# Patient Record
Sex: FEMALE | Race: WHITE | NOT HISPANIC OR LATINO | Employment: OTHER | URBAN - METROPOLITAN AREA
[De-identification: names, ages, dates, MRNs, and addresses within clinical notes are randomized per-mention and may not be internally consistent; named-entity substitution may affect disease eponyms.]

---

## 2017-02-16 ENCOUNTER — RECORDS - HEALTHEAST (OUTPATIENT)
Dept: LAB | Facility: CLINIC | Age: 62
End: 2017-02-16

## 2017-02-16 LAB
CHOLEST SERPL-MCNC: 221 MG/DL
FASTING STATUS PATIENT QL REPORTED: ABNORMAL
HDLC SERPL-MCNC: 39 MG/DL
LDLC SERPL CALC-MCNC: 113 MG/DL
TRIGL SERPL-MCNC: 344 MG/DL

## 2017-12-07 ENCOUNTER — RECORDS - HEALTHEAST (OUTPATIENT)
Dept: LAB | Facility: CLINIC | Age: 62
End: 2017-12-07

## 2017-12-07 LAB
CHOLEST SERPL-MCNC: 148 MG/DL
FASTING STATUS PATIENT QL REPORTED: ABNORMAL
HDLC SERPL-MCNC: 40 MG/DL
LDLC SERPL CALC-MCNC: 61 MG/DL
TRIGL SERPL-MCNC: 237 MG/DL

## 2018-05-14 ENCOUNTER — RECORDS - HEALTHEAST (OUTPATIENT)
Dept: LAB | Facility: CLINIC | Age: 63
End: 2018-05-14

## 2018-05-14 LAB — TSH SERPL DL<=0.005 MIU/L-ACNC: 5.7 UIU/ML (ref 0.3–5)

## 2018-07-11 ENCOUNTER — RECORDS - HEALTHEAST (OUTPATIENT)
Dept: LAB | Facility: CLINIC | Age: 63
End: 2018-07-11

## 2018-07-12 LAB — BACTERIA SPEC CULT: NO GROWTH

## 2018-09-13 ENCOUNTER — HOSPITAL ENCOUNTER (OUTPATIENT)
Dept: MAMMOGRAPHY | Facility: CLINIC | Age: 63
Discharge: HOME | End: 2018-09-13

## 2018-09-13 DIAGNOSIS — Z12.31 VISIT FOR SCREENING MAMMOGRAM: ICD-10-CM

## 2018-12-20 ENCOUNTER — RECORDS - HEALTHEAST (OUTPATIENT)
Dept: LAB | Facility: CLINIC | Age: 63
End: 2018-12-20

## 2018-12-20 LAB
ANION GAP SERPL CALCULATED.3IONS-SCNC: 11 MMOL/L (ref 5–18)
BUN SERPL-MCNC: 22 MG/DL (ref 8–22)
CALCIUM SERPL-MCNC: 9.9 MG/DL (ref 8.5–10.5)
CHLORIDE BLD-SCNC: 101 MMOL/L (ref 98–107)
CO2 SERPL-SCNC: 26 MMOL/L (ref 22–31)
CREAT SERPL-MCNC: 1.06 MG/DL (ref 0.6–1.1)
GFR SERPL CREATININE-BSD FRML MDRD: 52 ML/MIN/1.73M2
GLUCOSE BLD-MCNC: 145 MG/DL (ref 70–125)
POTASSIUM BLD-SCNC: 3.8 MMOL/L (ref 3.5–5)
SODIUM SERPL-SCNC: 138 MMOL/L (ref 136–145)

## 2019-01-24 ENCOUNTER — RECORDS - HEALTHEAST (OUTPATIENT)
Dept: LAB | Facility: CLINIC | Age: 64
End: 2019-01-24

## 2019-01-24 LAB
CHOLEST SERPL-MCNC: 254 MG/DL
CREAT UR-MCNC: 124.5 MG/DL
FASTING STATUS PATIENT QL REPORTED: NO
HDLC SERPL-MCNC: 47 MG/DL
LDLC SERPL CALC-MCNC: 141 MG/DL
LDLC SERPL CALC-MCNC: ABNORMAL MG/DL
MICROALBUMIN UR-MCNC: 120.23 MG/DL (ref 0–1.99)
MICROALBUMIN/CREAT UR: 965.7 MG/G
TRIGL SERPL-MCNC: 452 MG/DL
TSH SERPL DL<=0.005 MIU/L-ACNC: 2.72 UIU/ML (ref 0.3–5)

## 2020-02-18 ENCOUNTER — OFFICE VISIT - HEALTHEAST (OUTPATIENT)
Dept: SURGERY | Facility: CLINIC | Age: 65
End: 2020-02-18

## 2020-02-18 ENCOUNTER — COMMUNICATION - HEALTHEAST (OUTPATIENT)
Dept: TELEHEALTH | Facility: CLINIC | Age: 65
End: 2020-02-18

## 2020-02-18 DIAGNOSIS — E66.811 OBESITY (BMI 30.0-34.9): ICD-10-CM

## 2020-02-18 DIAGNOSIS — E11.29 TYPE 2 DIABETES MELLITUS WITH MICROALBUMINURIA, UNSPECIFIED WHETHER LONG TERM INSULIN USE: ICD-10-CM

## 2020-02-18 DIAGNOSIS — R80.9 TYPE 2 DIABETES MELLITUS WITH MICROALBUMINURIA, UNSPECIFIED WHETHER LONG TERM INSULIN USE: ICD-10-CM

## 2020-02-18 DIAGNOSIS — K21.9 GASTROESOPHAGEAL REFLUX DISEASE, ESOPHAGITIS PRESENCE NOT SPECIFIED: ICD-10-CM

## 2020-02-18 DIAGNOSIS — I10 BENIGN ESSENTIAL HYPERTENSION: ICD-10-CM

## 2020-02-18 ASSESSMENT — MIFFLIN-ST. JEOR: SCORE: 1354.62

## 2020-02-19 ENCOUNTER — OFFICE VISIT - HEALTHEAST (OUTPATIENT)
Dept: SURGERY | Facility: CLINIC | Age: 65
End: 2020-02-19

## 2020-02-19 DIAGNOSIS — I10 BENIGN ESSENTIAL HYPERTENSION: ICD-10-CM

## 2020-02-19 DIAGNOSIS — R80.9 TYPE 2 DIABETES MELLITUS WITH MICROALBUMINURIA, UNSPECIFIED WHETHER LONG TERM INSULIN USE: ICD-10-CM

## 2020-02-19 DIAGNOSIS — E11.29 TYPE 2 DIABETES MELLITUS WITH MICROALBUMINURIA, UNSPECIFIED WHETHER LONG TERM INSULIN USE: ICD-10-CM

## 2020-02-19 DIAGNOSIS — E66.811 OBESITY (BMI 30.0-34.9): ICD-10-CM

## 2020-02-19 ASSESSMENT — MIFFLIN-ST. JEOR: SCORE: 1354.62

## 2020-03-26 ENCOUNTER — OFFICE VISIT - HEALTHEAST (OUTPATIENT)
Dept: SURGERY | Facility: CLINIC | Age: 65
End: 2020-03-26

## 2020-03-26 DIAGNOSIS — E11.29 TYPE 2 DIABETES MELLITUS WITH MICROALBUMINURIA, UNSPECIFIED WHETHER LONG TERM INSULIN USE: ICD-10-CM

## 2020-03-26 DIAGNOSIS — E66.811 OBESITY (BMI 30.0-34.9): ICD-10-CM

## 2020-03-26 DIAGNOSIS — R80.9 TYPE 2 DIABETES MELLITUS WITH MICROALBUMINURIA, UNSPECIFIED WHETHER LONG TERM INSULIN USE: ICD-10-CM

## 2020-03-26 ASSESSMENT — MIFFLIN-ST. JEOR: SCORE: 1354.62

## 2020-04-22 ENCOUNTER — OFFICE VISIT - HEALTHEAST (OUTPATIENT)
Dept: SURGERY | Facility: CLINIC | Age: 65
End: 2020-04-22

## 2020-04-22 DIAGNOSIS — E11.29 TYPE 2 DIABETES MELLITUS WITH MICROALBUMINURIA, UNSPECIFIED WHETHER LONG TERM INSULIN USE: ICD-10-CM

## 2020-04-22 DIAGNOSIS — E66.811 OBESITY (BMI 30.0-34.9): ICD-10-CM

## 2020-04-22 DIAGNOSIS — K21.9 GASTROESOPHAGEAL REFLUX DISEASE, ESOPHAGITIS PRESENCE NOT SPECIFIED: ICD-10-CM

## 2020-04-22 DIAGNOSIS — R80.9 TYPE 2 DIABETES MELLITUS WITH MICROALBUMINURIA, UNSPECIFIED WHETHER LONG TERM INSULIN USE: ICD-10-CM

## 2020-04-22 NOTE — ASSESSMENT & PLAN NOTE
We discussed healthy habits to assist with weight loss. We discussed medication that may assist with weight loss. She set a goal to try to eat one vegetable per day. She will review the lean protein handout and will try to work in some healthier protein. She will hold off on taking the topamaxfor now.

## 2020-04-22 NOTE — ASSESSMENT & PLAN NOTE
She is fatigued. Her blood sugars are all over the board. She will keep a diary of all food and drink intake, energy level and BS. She has a follow up appointment with Dr. Engel next month. She will continue current medications.

## 2020-05-07 ENCOUNTER — COMMUNICATION - HEALTHEAST (OUTPATIENT)
Dept: PHARMACY | Facility: CLINIC | Age: 65
End: 2020-05-07

## 2020-05-21 ENCOUNTER — OFFICE VISIT - HEALTHEAST (OUTPATIENT)
Dept: SURGERY | Facility: CLINIC | Age: 65
End: 2020-05-21

## 2020-05-21 DIAGNOSIS — Z71.3 NUTRITIONAL COUNSELING: ICD-10-CM

## 2020-05-21 DIAGNOSIS — E66.811 OBESITY (BMI 30.0-34.9): ICD-10-CM

## 2020-05-21 DIAGNOSIS — R80.9 TYPE 2 DIABETES MELLITUS WITH MICROALBUMINURIA, UNSPECIFIED WHETHER LONG TERM INSULIN USE: ICD-10-CM

## 2020-05-21 DIAGNOSIS — E11.29 TYPE 2 DIABETES MELLITUS WITH MICROALBUMINURIA, UNSPECIFIED WHETHER LONG TERM INSULIN USE: ICD-10-CM

## 2020-05-21 DIAGNOSIS — I10 BENIGN ESSENTIAL HYPERTENSION: ICD-10-CM

## 2020-05-21 ASSESSMENT — MIFFLIN-ST. JEOR: SCORE: 1354.62

## 2020-06-18 ENCOUNTER — RECORDS - HEALTHEAST (OUTPATIENT)
Dept: LAB | Facility: CLINIC | Age: 65
End: 2020-06-18

## 2020-06-18 LAB
ALBUMIN SERPL-MCNC: 3.7 G/DL (ref 3.5–5)
ALP SERPL-CCNC: 70 U/L (ref 45–120)
ALT SERPL W P-5'-P-CCNC: 16 U/L (ref 0–45)
ANION GAP SERPL CALCULATED.3IONS-SCNC: 13 MMOL/L (ref 5–18)
AST SERPL W P-5'-P-CCNC: 18 U/L (ref 0–40)
BILIRUB SERPL-MCNC: 1.2 MG/DL (ref 0–1)
BUN SERPL-MCNC: 20 MG/DL (ref 8–22)
CALCIUM SERPL-MCNC: 9.4 MG/DL (ref 8.5–10.5)
CHLORIDE BLD-SCNC: 103 MMOL/L (ref 98–107)
CHOLEST SERPL-MCNC: 157 MG/DL
CO2 SERPL-SCNC: 26 MMOL/L (ref 22–31)
CREAT SERPL-MCNC: 1.14 MG/DL (ref 0.6–1.1)
CREAT UR-MCNC: 124.8 MG/DL
FASTING STATUS PATIENT QL REPORTED: ABNORMAL
GFR SERPL CREATININE-BSD FRML MDRD: 48 ML/MIN/1.73M2
GLUCOSE BLD-MCNC: 140 MG/DL (ref 70–125)
HDLC SERPL-MCNC: 44 MG/DL
LDLC SERPL CALC-MCNC: 48 MG/DL
MICROALBUMIN UR-MCNC: 433.42 MG/DL (ref 0–1.99)
MICROALBUMIN/CREAT UR: 3472.9 MG/G
POTASSIUM BLD-SCNC: 4.4 MMOL/L (ref 3.5–5)
PROT SERPL-MCNC: 6.5 G/DL (ref 6–8)
SODIUM SERPL-SCNC: 142 MMOL/L (ref 136–145)
TRIGL SERPL-MCNC: 326 MG/DL
TSH SERPL DL<=0.005 MIU/L-ACNC: 2.81 UIU/ML (ref 0.3–5)
VIT B12 SERPL-MCNC: 236 PG/ML (ref 213–816)

## 2020-06-25 ENCOUNTER — OFFICE VISIT - HEALTHEAST (OUTPATIENT)
Dept: SURGERY | Facility: CLINIC | Age: 65
End: 2020-06-25

## 2020-06-25 DIAGNOSIS — R80.9 TYPE 2 DIABETES MELLITUS WITH MICROALBUMINURIA, UNSPECIFIED WHETHER LONG TERM INSULIN USE: ICD-10-CM

## 2020-06-25 DIAGNOSIS — Z71.3 NUTRITIONAL COUNSELING: ICD-10-CM

## 2020-06-25 DIAGNOSIS — E66.811 OBESITY, CLASS I, BMI 30.0-34.9 (SEE ACTUAL BMI): ICD-10-CM

## 2020-06-25 DIAGNOSIS — E11.29 TYPE 2 DIABETES MELLITUS WITH MICROALBUMINURIA, UNSPECIFIED WHETHER LONG TERM INSULIN USE: ICD-10-CM

## 2020-06-25 ASSESSMENT — MIFFLIN-ST. JEOR: SCORE: 1354.62

## 2020-07-09 ENCOUNTER — RECORDS - HEALTHEAST (OUTPATIENT)
Dept: LAB | Facility: CLINIC | Age: 65
End: 2020-07-09

## 2020-07-09 LAB
ANION GAP SERPL CALCULATED.3IONS-SCNC: 13 MMOL/L (ref 5–18)
BUN SERPL-MCNC: 37 MG/DL (ref 8–22)
CALCIUM SERPL-MCNC: 9.5 MG/DL (ref 8.5–10.5)
CHLORIDE BLD-SCNC: 103 MMOL/L (ref 98–107)
CO2 SERPL-SCNC: 22 MMOL/L (ref 22–31)
CREAT SERPL-MCNC: 1.24 MG/DL (ref 0.6–1.1)
GFR SERPL CREATININE-BSD FRML MDRD: 43 ML/MIN/1.73M2
GLUCOSE BLD-MCNC: 72 MG/DL (ref 70–125)
POTASSIUM BLD-SCNC: 4.2 MMOL/L (ref 3.5–5)
SODIUM SERPL-SCNC: 138 MMOL/L (ref 136–145)

## 2020-08-24 ENCOUNTER — COMMUNICATION - HEALTHEAST (OUTPATIENT)
Dept: CARE COORDINATION | Facility: CLINIC | Age: 65
End: 2020-08-24

## 2020-08-24 ENCOUNTER — AMBULATORY - HEALTHEAST (OUTPATIENT)
Dept: CARE COORDINATION | Facility: CLINIC | Age: 65
End: 2020-08-24

## 2020-08-24 DIAGNOSIS — U07.1 2019 NOVEL CORONAVIRUS DISEASE (COVID-19): ICD-10-CM

## 2020-09-01 ENCOUNTER — COMMUNICATION - HEALTHEAST (OUTPATIENT)
Dept: SURGERY | Facility: CLINIC | Age: 65
End: 2020-09-01

## 2021-02-19 ENCOUNTER — RECORDS - HEALTHEAST (OUTPATIENT)
Dept: LAB | Facility: CLINIC | Age: 66
End: 2021-02-19

## 2021-02-20 LAB — BACTERIA SPEC CULT: NO GROWTH

## 2021-04-07 ENCOUNTER — RECORDS - HEALTHEAST (OUTPATIENT)
Dept: LAB | Facility: CLINIC | Age: 66
End: 2021-04-07

## 2021-04-07 LAB
ALBUMIN SERPL-MCNC: 3.5 G/DL (ref 3.5–5)
ALP SERPL-CCNC: 74 U/L (ref 45–120)
ALT SERPL W P-5'-P-CCNC: 16 U/L (ref 0–45)
ANION GAP SERPL CALCULATED.3IONS-SCNC: 13 MMOL/L (ref 5–18)
AST SERPL W P-5'-P-CCNC: 12 U/L (ref 0–40)
BILIRUB SERPL-MCNC: 1 MG/DL (ref 0–1)
BUN SERPL-MCNC: 26 MG/DL (ref 8–22)
CALCIUM SERPL-MCNC: 8.9 MG/DL (ref 8.5–10.5)
CHLORIDE BLD-SCNC: 100 MMOL/L (ref 98–107)
CHOLEST SERPL-MCNC: 216 MG/DL
CO2 SERPL-SCNC: 25 MMOL/L (ref 22–31)
CREAT SERPL-MCNC: 1.59 MG/DL (ref 0.6–1.1)
FASTING STATUS PATIENT QL REPORTED: ABNORMAL
GFR SERPL CREATININE-BSD FRML MDRD: 32 ML/MIN/1.73M2
GLUCOSE BLD-MCNC: 288 MG/DL (ref 70–125)
HDLC SERPL-MCNC: 48 MG/DL
LDLC SERPL CALC-MCNC: 117 MG/DL
LDLC SERPL CALC-MCNC: ABNORMAL MG/DL
POTASSIUM BLD-SCNC: 4.7 MMOL/L (ref 3.5–5)
PROT SERPL-MCNC: 6.6 G/DL (ref 6–8)
SODIUM SERPL-SCNC: 138 MMOL/L (ref 136–145)
T4 FREE SERPL-MCNC: 0.9 NG/DL (ref 0.7–1.8)
TRIGL SERPL-MCNC: 467 MG/DL
TSH SERPL DL<=0.005 MIU/L-ACNC: 5.55 UIU/ML (ref 0.3–5)

## 2021-05-24 ENCOUNTER — RECORDS - HEALTHEAST (OUTPATIENT)
Dept: ADMINISTRATIVE | Facility: CLINIC | Age: 66
End: 2021-05-24

## 2021-05-25 ENCOUNTER — RECORDS - HEALTHEAST (OUTPATIENT)
Dept: ADMINISTRATIVE | Facility: CLINIC | Age: 66
End: 2021-05-25

## 2021-05-26 ENCOUNTER — RECORDS - HEALTHEAST (OUTPATIENT)
Dept: ADMINISTRATIVE | Facility: CLINIC | Age: 66
End: 2021-05-26

## 2021-05-27 ENCOUNTER — RECORDS - HEALTHEAST (OUTPATIENT)
Dept: ADMINISTRATIVE | Facility: CLINIC | Age: 66
End: 2021-05-27

## 2021-05-28 ENCOUNTER — RECORDS - HEALTHEAST (OUTPATIENT)
Dept: ADMINISTRATIVE | Facility: CLINIC | Age: 66
End: 2021-05-28

## 2021-05-29 ENCOUNTER — RECORDS - HEALTHEAST (OUTPATIENT)
Dept: ADMINISTRATIVE | Facility: CLINIC | Age: 66
End: 2021-05-29

## 2021-05-30 ENCOUNTER — RECORDS - HEALTHEAST (OUTPATIENT)
Dept: ADMINISTRATIVE | Facility: CLINIC | Age: 66
End: 2021-05-30

## 2021-06-02 ENCOUNTER — RECORDS - HEALTHEAST (OUTPATIENT)
Dept: ADMINISTRATIVE | Facility: CLINIC | Age: 66
End: 2021-06-02

## 2021-06-04 VITALS
WEIGHT: 191 LBS | DIASTOLIC BLOOD PRESSURE: 82 MMHG | BODY MASS INDEX: 35.15 KG/M2 | HEIGHT: 62 IN | OXYGEN SATURATION: 98 % | SYSTOLIC BLOOD PRESSURE: 134 MMHG | HEART RATE: 88 BPM | RESPIRATION RATE: 18 BRPM

## 2021-06-04 VITALS — BODY MASS INDEX: 35.15 KG/M2 | HEIGHT: 62 IN | WEIGHT: 191 LBS

## 2021-06-04 VITALS — HEIGHT: 62 IN | BODY MASS INDEX: 35.15 KG/M2 | WEIGHT: 191 LBS

## 2021-06-04 VITALS — WEIGHT: 191 LBS | BODY MASS INDEX: 35.15 KG/M2 | HEIGHT: 62 IN

## 2021-06-06 NOTE — PROGRESS NOTES
Medical  Weight Loss Initial Diet Evaluation  Mariama is presenting today for a new weight management and diabetes nutrition consultation. Pt has had an initial appointment with Dr. Jackson.  Weight loss medication: topamax. Patient unsure if she will fill this. Patient has panic related to medications.   Weight Loss Goal: patient would like to get control of her eating for her health and diabetes, 135lb  Nutrition Assessment:   Anthropometrics:  Pt's Initial Weight: 191 lbs  Weight: 191 lb (86.6 kg)  Weight loss from initial: 0  % Weight loss: 0 %    BMI:   Vitals:    02/19/20 0700   Weight: 191 lb (86.6 kg)      IBW: 110-120lb  Estimated RMR ([place]-St Jeor equation): 1365kcal   Recommended Protein Intake: 66-88 grams of protein/day  Medical History:  Patient Active Problem List   Diagnosis     Diabetes Mellitus Poorly Controlled     Hyperlipidemia     Obesity     Hypertension     Esophageal Reflux     Anxiety     Chronic fatigue syndrome     Hypothyroidism     Long term current use of insulin (H)     Macromastia     Polyneuropathy due to type 2 diabetes mellitus (H)     Recurrent major depression in full remission (H)     Tobacco user     Vitamin D deficiency     Diabetes: Yes- she feels as though she can get on track and stay there for a little while and then gets off track  Testing blood sugars: Yes occasionally- couple days ago was 96 A1c a year ago 7.7  DM Meds currently taking: glipizide-metformin, levemir  Nutrition History:   Food allergies/intolerances/cultural or religous food customs: No   Biggest weight loss struggle per pt: eats when she wants to eat   Vitamins/Mineral Supplementation: none  Dietary Recall:  +Gets food stamps and uses them for [employer] food  Wake up time: patient has a hard time sleeping  Breakfast: 2 pieces of peanut butter toast and a cup of black coffee  Snack: none  Lunch: hamburger hot dish with veggies and noodles  Snack: none  Dinner:hamburger hot dish with noodles and veggies,  1 hour later small bowl of chili  Snack: 2 more small bowls of chili throughout the night  Overnight eating: Yes- anything  Hydration (type/oz. per day):  Water: 96oz  Caffeine: black coffee  Carbonation: diet cherry coke, 3-4 times per week   Alcohol : very little  Exercise:  Routine exercise established: No  Has a gym right next to her apartment- exercised with son   Nutrition Diagnosis (PES statement):   Overweight/Obesity (NC 3.3) related to overeating and poor lifestyle habits as evidenced by patient report of frequent snacking, large portions, high carbohydrate diet, lack of activity and BMI 34.93  Not ready for diet/lifestyle change (NB 1.3) related to unsupported beliefs/attitudes about food, nutrition and nutrition-related topics as evidenced by patient's subjective statements, denial of need to change  Nutrition Intervention:  1. Food and/or Nutrient Delivery   a. Placed emphasis on importance of developing a healthy meal routine, aiming for 3 meals a day and no snacks.  2. Nutrition Education   a. Discussed with patient how to build a meal: the importance of including a lean/low fat protein at each meal, include a source of vegetables at a minimum of lunch and dinner and limiting carbohydrate intake to 30g per meal.  b. Educated on sources of lean protein, portion sizes, the amount of grams found in each source. Recommend patient to aim for 20-30g protein at each meal.  c. Educated on label reading for fiber and net carbohydrates for blood glucose control  3. Nutrition Counseling   a. Encouraged importance of developing routine exercise for health benefits and weight loss.    Goals established by patient:   1. Gym 4 days per week  2. Practice planning and prepping for meals- patient is planning to do bean soups and stews  Handouts provided:  Plate Method  Nutrition Monitoring/Evaluation:   Follow up:  Pt will follow up in 2 month(s) with bariatrician and 1 month(s) with dietitian.   Time spent with  patient: 30 minutes  Maci Ruggiero RD   ABN: Yes

## 2021-06-06 NOTE — PATIENT INSTRUCTIONS - HE

## 2021-06-07 NOTE — PATIENT INSTRUCTIONS - HE

## 2021-06-07 NOTE — PROGRESS NOTES
"Mariama Keene is a 64 y.o. female who is being evaluated via a billable telephone visit.      The patient has been notified of following:     \"This telephone visit will be conducted via a call between you and your physician/provider. We have found that certain health care needs can be provided without the need for a physical exam.  This service lets us provide the care you need with a short phone conversation.  If a prescription is necessary we can send it directly to your pharmacy.  If lab work is needed we can place an order for that and you can then stop by our lab to have the test done at a later time.    Telephone visits are billed at different rates depending on your insurance coverage. During this emergency period, for some insurers they may be billed the same as an in-person visit.  Please reach out to your insurance provider with any questions.    If during the course of the call the physician/provider feels a telephone visit is not appropriate, you will not be charged for this service.\"    Patient has given verbal consent to a Telephone visit? Yes    Patient would like to receive their AVS by AVS Preference: Morgan.      Phone call duration: 20 minutes    Jenna Villeda CMA      "

## 2021-06-07 NOTE — PROGRESS NOTES
"    Mariama Keene is a 64 y.o. female who is being evaluated via a billable telephone visit.      The patient has been notified of following:     \"This telephone visit will be conducted via a call between you and your physician/provider. We have found that certain health care needs can be provided without the need for a physical exam.  This service lets us provide the care you need with a short phone conversation.  If a prescription is necessary we can send it directly to your pharmacy.  If lab work is needed we can place an order for that and you can then stop by our lab to have the test done at a later time.    If during the course of the call the physician/provider feels a telephone visit is not appropriate, you will not be charged for this service.\"     Provider notes:    Mariama Keene presents for non surgical weight loss management.    MEDICATIONS were reviewed and updated in the chart    ALLERGIES  Alprazolam; Cephalexin; Exenatide; Iodinated contrast media; Lisinopril; Nitrofurantoin monohyd/m-cryst; and Sulfa (sulfonamide antibiotics)    Patient Profile   Social History     Social History Narrative     Not on file        Past Medical History   Past Medical History:   Diagnosis Date     Asthma      Diabetes (H)      Hypertension      Panic attacks      Patient Active Problem List   Diagnosis     Diabetes Mellitus Poorly Controlled     Hyperlipidemia     Obesity     Hypertension     Esophageal Reflux     Anxiety     Chronic fatigue syndrome     Hypothyroidism     Long term current use of insulin (H)     Macromastia     Polyneuropathy due to type 2 diabetes mellitus (H)     Recurrent major depression in full remission (H)     Tobacco user     Vitamin D deficiency     Gastroesophageal reflux disease, esophagitis presence not specified     Obesity (BMI 30.0-34.9)     Current Outpatient Medications   Medication Sig     glipiZIDE-metFORMIN (METAGLIP) 5-500 mg per tablet Take 2 tablets by mouth 2 (two) times a " day.      insulin detemir U-100 (LEVEMIR) 100 unit/mL injection Inject 53 Units under the skin at bedtime.     levothyroxine (SYNTHROID) 75 MCG tablet Take 1 tablet by mouth daily.      metoprolol tartrate (LOPRESSOR) 50 MG tablet Take 2 tablets by mouth 2 (two) times a day.      omeprazole (PRILOSEC) 20 MG capsule Take 1 capsule by mouth daily.      rosuvastatin (CRESTOR) 5 MG tablet Take 1 tablet by mouth daily.      topiramate (TOPAMAX) 50 MG tablet 1/2 tab with dinner x 1 week then 1 tab with dinner every day       Past Surgical History  She has a past surgical history that includes Oophorectomy; Hysterectomy; Abscess drainage; and Partial nephrectomy (Right).       INTERIM HISTORY  Patient has met with the dietician on 2 occasions since our initial visit. She has been feeling overwhelmed with making food choices. She has not started the topamax.     PATIENT REPORTED CURRENT WEIGHT   unsure    DIETARY HISTORY  MEALS: B: bean soup or oatmeal and toast or english muffin and grapefruit  L: meatloaf or hotdogs with bread, very rare vegetables D: similar to lunch  SNACKS: candy, sweats- has been trying to avoid  NUMBER OF MEALS/WEEK NOT PREPARED AT HOME: rare  CALORIE CONTAINING BEVERAGES/WEEK: not discussed    Positive Changes Since Last Visit: trying to avoid sweets  Struggling With: low energy, vegetable intake    PHYSICAL ACTIVITY PATTERNS:  Cardiovascular: she was riding the exercise bike for 15 minutes twice a week, now she finds she can't ride as long do to low energy and back pain and loss of strength   Strength Training: less recently    REVIEW OF SYSTEMS  GENERAL/CONSTITUTIONAL:  Fatigue: yes  HEENT:  Vision changes, glaucoma: no  CARDIOVASCULAR:  Chest Pain with Exertion: no  PULMONARY:  Dyspnea on exertion: yes  NEUROLOGIC:  Paresthesias: sometimes  PSYCHIATRIC:  Moods: frustrated  MUSCULOSKELETAL/RHEUMATOLOGIC  Arthralgias: yes  Myalgias: yes  ENDOCRINE:  Monitoring Blood Sugars: yes- am only  Sugars  Well Controlled: 117, 445?, 224, 75, 164, 78       Patient Profile   Social History     Social History Narrative     Not on file        Past Medical History   Past Medical History:   Diagnosis Date     Asthma      Diabetes (H)      Hypertension      Panic attacks      Patient Active Problem List   Diagnosis     Diabetes Mellitus Poorly Controlled     Hyperlipidemia     Obesity     Hypertension     Esophageal Reflux     Anxiety     Chronic fatigue syndrome     Hypothyroidism     Long term current use of insulin (H)     Macromastia     Polyneuropathy due to type 2 diabetes mellitus (H)     Recurrent major depression in full remission (H)     Tobacco user     Vitamin D deficiency     Gastroesophageal reflux disease, esophagitis presence not specified     Obesity (BMI 30.0-34.9)     Current Outpatient Medications   Medication Sig     glipiZIDE-metFORMIN (METAGLIP) 5-500 mg per tablet Take 2 tablets by mouth 2 (two) times a day.      insulin detemir U-100 (LEVEMIR) 100 unit/mL injection Inject 53 Units under the skin at bedtime.     levothyroxine (SYNTHROID) 75 MCG tablet Take 1 tablet by mouth daily.      metoprolol tartrate (LOPRESSOR) 50 MG tablet Take 2 tablets by mouth 2 (two) times a day.      omeprazole (PRILOSEC) 20 MG capsule Take 1 capsule by mouth daily.      rosuvastatin (CRESTOR) 5 MG tablet Take 1 tablet by mouth daily.      topiramate (TOPAMAX) 50 MG tablet 1/2 tab with dinner x 1 week then 1 tab with dinner every day       Past Surgical History  She has a past surgical history that includes Oophorectomy; Hysterectomy; Abscess drainage; and Partial nephrectomy (Right).         Counseling:   We reviewed the important healthy habits for weight loss:  -eating 3 meals daily  -eating protein first, getting >60gm protein daily  -eating slowly, chewing food well  -avoiding/limiting calorie containing beverages  -limiting starchy vegetables and carbohydrates, choosing wheat, not white with breads,   crackers,  pastas, rodriguez, bagels, tortillas, rice  -limiting restaurant or cafeteria eating to twice a week or less  -drinking adequate water    We discussed the importance of restorative sleep and stress management in maintaining a healthy weight.  We discussed the importance of physical activity including cardiovascular and strength training in maintaining a healthier weight.          Assessment/Plan:    Gastroesophageal reflux disease, esophagitis presence not specified  This may improve with healthy habits and weight loss.    Type 2 diabetes mellitus with microalbuminuria, unspecified whether long term insulin use (H)  She is fatigued. Her blood sugars are all over the board. She will keep a diary of all food and drink intake, energy level and BS. She has a follow up appointment with Dr. Engel next month. She will continue current medications.     Obesity (BMI 30.0-34.9)  We discussed healthy habits to assist with weight loss. We discussed medication that may assist with weight loss. She set a goal to try to eat one vegetable per day. She will review the lean protein handout and will try to work in some healthier protein. She will hold off on taking the topamax for now.        I have reviewed the note as documented above.  This accurately captures the substance of my conversation with the patient.      Phone call contact time    Call Started at: 9:10 am  Call Ended at: 9:35 am      Signature:  TEE Jackson MD  [facility]

## 2021-06-07 NOTE — PATIENT INSTRUCTIONS - HE
Go grocery shopping at 7am tomorrow morning    Breakfast 7-8am yogurt and grapefruit with coffee    Lunch 12-1p    Dinner 6-7pm hotdishes    Ride bike in the morning and evening

## 2021-06-07 NOTE — PROGRESS NOTES
"Mariama Keene is a 64 y.o. female who is being evaluated via a billable telephone visit.      The patient has been notified of following:     \"This telephone visit will be conducted via a call between you and your physician/provider. We have found that certain health care needs can be provided without the need for a physical exam.  This service lets us provide the care you need with a short phone conversation.  If a prescription is necessary we can send it directly to your pharmacy.  If lab work is needed we can place an order for that and you can then stop by our lab to have the test done at a later time.    If during the course of the call the physician/provider feels a telephone visit is not appropriate, you will not be charged for this service.\"     Mariama Keene complains of    Chief Complaint   Patient presents with     Nutrition Counseling       I have reviewed and updated the patient's Past Medical History, Social History, Family History and Medication List.    ALLERGIES  Alprazolam; Cephalexin; Exenatide; Iodinated contrast media; Lisinopril; Nitrofurantoin monohyd/m-cryst; and Sulfa (sulfonamide antibiotics)    Additional provider notes:     Medical  Weight Loss Follow-Up Diet Evaluation  Assessment:  Mariama is presenting today for a follow up weight management nutrition consultation. Pt has had an initial appointment with Dr. Jackson  Weight loss medication: topamax, has not been taking it, but has been thinking of starting.  Pt's Initial Weight: 191 lbs  Weight: 191 lb (86.6 kg)  Weight loss from initial: 0  % Weight loss: 0 %    BMI: Body mass index is 34.93 kg/m .  IBW: 110-120 lbs    Estimated RMR ([place]-St Jeor equation): 1365kcal   Recommended Protein Intake: 66-88 grams of protein/day  Patient Active Problem List:  Patient Active Problem List   Diagnosis     Diabetes Mellitus Poorly Controlled     Hyperlipidemia     Obesity     Hypertension     Esophageal Reflux     Anxiety     Chronic fatigue " "syndrome     Hypothyroidism     Long term current use of insulin (H)     Macromastia     Polyneuropathy due to type 2 diabetes mellitus (H)     Recurrent major depression in full remission (H)     Tobacco user     Vitamin D deficiency     Diabetes: Yes not testing blood glucose \"because I know they are going to be high\"    Progress on goals from last visit: Patient is a bit nervous because \"I am a diabetic and I don't take care of myself.\" She states she is overwhelmed with food choices, reporting that it is hard to find her motivation despite knowing that taking care of herself will make her life longer.   We discussed making small, specific changes to as not to overwhelm her, starting with creating structure in her routine.     Dietary Recall:  Breakfast: cheese and egg omelet, 2 sausage links, grapefruit and piece of toast  Snack:none  Lunch: 4pm- frozen meal- healthy choice, diet coke  Snack: none  Dinner: ritz crackers- 1/2 sleeve with 1000 island dressing  Overnight eating: No  Hydration (type/oz. per day):  Water: 80-90oz  Caffeine: black coffee   Carbonation: diet coke occasionally  Exercise:  Routine exercise established: Yes  20 min bike in the morning and afternoon, weights     Nutrition Diagnosis:    Overweight/Obesity (NC 3.3) related to overeating and poor lifestyle habits as evidenced by patient's report of frequent snacking, lack of motivation and BMI 34.93  Not ready for diet/lifestyle change (NB 1.3) related to unsupported beliefs/attitudes about food, nutrition and nutrition-related topics as evidenced by patient's subjective statements, inability to meet goals previously set       Intervention:    1. Nutrition counseling: discussed developing structure in her routine, patient thinks of all the things she needs to change and then doesn't do any of them, so today we reviewed the importance of taking baby steps, one change at a time.     Monitoring/Evaluation:    Goals:  1. Follow eating routine of " breakfast 7-8a, lunch 12-1p and dinner 6-7pm  2. Exercise in morning and evening on bike 20 min each time    Assessment/Plan:  1. Type 2 diabetes mellitus with microalbuminuria, unspecified whether long term insulin use (H)    2. Obesity (BMI 30.0-34.9)      Patient to follow up in 1 months(s) with bariatrician and 2 month(s) with RD    Phone call duration: 30 minutes    Maci Ruggiero RD

## 2021-06-08 NOTE — PROGRESS NOTES
"Mariama Keene is a 64 y.o. female who is being evaluated via a billable telephone visit.      The patient has been notified of following:     \"This telephone visit will be conducted via a call between you and your physician/provider. We have found that certain health care needs can be provided without the need for a physical exam.  This service lets us provide the care you need with a short phone conversation.  If a prescription is necessary we can send it directly to your pharmacy.  If lab work is needed we can place an order for that and you can then stop by our lab to have the test done at a later time.    If during the course of the call the physician/provider feels a telephone visit is not appropriate, you will not be charged for this service.\"     Mariama Keene complains of    Chief Complaint   Patient presents with     Nutrition Counseling       I have reviewed and updated the patient's Past Medical History, Social History, Family History and Medication List.    ALLERGIES  Alprazolam; Cephalexin; Exenatide; Iodinated contrast media; Lisinopril; Nitrofurantoin monohyd/m-cryst; and Sulfa (sulfonamide antibiotics)    Additional provider notes:     Medical  Weight Loss Follow-Up Diet Evaluation  Assessment:  Mariama is presenting today for a follow up weight management nutrition consultation. Pt has had an initial appointment with Dr. Jackson  Pt's Initial Weight: 191 lbs  Weight: 191 lb (86.6 kg) (no new weight)  Weight loss from initial: 0  % Weight loss: 0 %    BMI: Body mass index is 34.93 kg/m .  IBW: 110-120 lbs    Estimated RMR ([place]-St Jeor equation): 1365 kcal   Recommended Protein Intake: 66-88 grams of protein/day  Patient Active Problem List:  Patient Active Problem List   Diagnosis     Type 2 diabetes mellitus with microalbuminuria, unspecified whether long term insulin use (H)     Hyperlipidemia     Obesity     Hypertension     Esophageal Reflux     Anxiety     Chronic fatigue syndrome     " "Hypothyroidism     Long term current use of insulin (H)     Macromastia     Polyneuropathy due to type 2 diabetes mellitus (H)     Recurrent major depression in full remission (H)     Tobacco user     Vitamin D deficiency     Gastroesophageal reflux disease, esophagitis presence not specified     Obesity (BMI 30.0-34.9)     Diabetes: Yes 109 yesterday    Progress on goals from last visit: patient states she is back on her diet, following a low carbohydrate diet. Patient states she \"hates\" veggies, but is eating stir fries and salads with homemade dressing and is enjoying the flavor and taste of these things. Trying to aim for 15-30g carbohydrate per meal, eating twice per day.   +patient was a lot more positive today, but did mention that she has a hard time continuing with positive changes.    Dietary Recall:  Breakfast: grapefruit and 1 egg and 3 pieces of ndiaye  Dinner:soft shell taco with meat, sour cream and hot sauce and a glass of milk  Snack: port wine cheese and crackers  +thinking about adding an apple  Exercise:  Routine exercise established: No  Will plant to start walking and getting back on the bike at the gym   We talked about dancing and patient states she is really excited to add this in    Nutrition Diagnosis:    Overweight/Obesity (NC 3.3) related to overeating and poor lifestyle habits as evidenced by patient's report of frequent snacking, lack of motivation and BMI 34.93    Intervention:  1. Food and/or nutrient delivery: discussed meal planning and developed alternate ideas for breakfast  2. Nutrition education: educated on carbohydrate counting, encouraging 30g carb per meal and 10g for snacks  3. Nutrition counseling: motivational interviewing    Monitoring/Evaluation:    Goals:  1. Walk and dance for exercise  2. 30g carbohydrate per meal, 10g carbohydrate for a snack    Assessment/Plan:    Patient to follow up in 1 month(s) with RD    Phone call duration: 30 minutes    Maci Ruggiero RD      "

## 2021-06-08 NOTE — TELEPHONE ENCOUNTER
Med Rec:                                                    Chart audited for medication reconciliation regarding the following medications:   Irbesartan      Pharmacy contacted: No  Patient contacted: No  Discrepancies found: No  Action taken: None - verified dosage against continuity of care document from Naye Marino PharmD, MELISSA, BCACP  [facility]     Time spent: 2 minutes

## 2021-06-09 NOTE — PROGRESS NOTES
"Mariama Keene is a 64 y.o. female who is being evaluated via a billable telephone visit.      The patient has been notified of following:     \"This telephone visit will be conducted via a call between you and your physician/provider. We have found that certain health care needs can be provided without the need for a physical exam.  This service lets us provide the care you need with a short phone conversation.  If a prescription is necessary we can send it directly to your pharmacy.  If lab work is needed we can place an order for that and you can then stop by our lab to have the test done at a later time.    If during the course of the call the physician/provider feels a telephone visit is not appropriate, you will not be charged for this service.\"     Mariama Keene complains of  No chief complaint on file.      I have reviewed and updated the patient's Past Medical History, Social History, Family History and Medication List.    ALLERGIES  Alprazolam; Cephalexin; Exenatide; Iodinated contrast media; Lisinopril; Nitrofurantoin monohyd/m-cryst; and Sulfa (sulfonamide antibiotics)    Additional provider notes:     Medical  Weight Loss Follow-Up Diet Evaluation  Assessment:  Mariama is presenting today for a follow up weight management nutrition consultation. Pt has had an initial appointment with Dr. Jackson  Pt's Initial Weight: 191 lbs  Weight: 191 lb (86.6 kg) (no new weight reported)  Weight loss from initial: 0  % Weight loss: 0 %    BMI: Body mass index is 34.93 kg/m .  IBW: 110-120 lbs    Estimated RMR ([place]-St Jeor equation): 1365kcal   Recommended Protein Intake: 66-88 grams of protein/day  Patient Active Problem List:  Patient Active Problem List   Diagnosis     Type 2 diabetes mellitus with microalbuminuria, unspecified whether long term insulin use (H)     Hyperlipidemia     Obesity     Hypertension     Esophageal Reflux     Anxiety     Chronic fatigue syndrome     Hypothyroidism     Long term current " use of insulin (H)     Macromastia     Polyneuropathy due to type 2 diabetes mellitus (H)     Recurrent major depression in full remission (H)     Tobacco user     Vitamin D deficiency     Gastroesophageal reflux disease, esophagitis presence not specified     Obesity (BMI 30.0-34.9)     Diabetes: Yes most recent A1c is 9 per patient     Progress on goals from last visit: patient states things are not as positive as last visit  Is drinking some tea in the morning with turmeric, cinnamon, et. She had good news from her doctor, stating her labs had improved. She was feeling a bit down today, stating she was eating a lot the last couple days and feeling like she needed to start over. We discussed dealing with set backs and ways to get back on track.    Nutrition Diagnosis:    Overweight/Obesity (NC 3.3) related to overeating and poor lifestyle habits as evidenced by patient's report of frequent snacking, lack of motivation and BMI 34.93  Intervention:  1. Food and/or nutrient delivery: encouraged patient to keep up with the carbohydrate counting  2. Nutrition counseling: motivational interviewing    Monitoring/Evaluation:    Goals:  1. Walk or dance with hand weights  2. 30g carb at each meal    Assessment/Plan:    Patient to follow up in 6 weeks with RD    Phone call duration: 25 minutes    Maci Ruggiero RD

## 2021-06-16 PROBLEM — E55.9 VITAMIN D DEFICIENCY: Status: ACTIVE | Noted: 2020-02-18

## 2021-06-16 PROBLEM — F41.9 ANXIETY: Status: ACTIVE | Noted: 2020-02-18

## 2021-06-16 PROBLEM — E11.42 POLYNEUROPATHY DUE TO TYPE 2 DIABETES MELLITUS (H): Status: ACTIVE | Noted: 2020-02-18

## 2021-06-16 PROBLEM — Z79.4 LONG TERM CURRENT USE OF INSULIN (H): Status: ACTIVE | Noted: 2020-02-18

## 2021-06-16 PROBLEM — N17.9 ACUTE KIDNEY INJURY (H): Status: ACTIVE | Noted: 2020-08-16

## 2021-06-16 PROBLEM — F33.42 RECURRENT MAJOR DEPRESSION IN FULL REMISSION (H): Status: ACTIVE | Noted: 2020-02-18

## 2021-06-16 PROBLEM — Z72.0 TOBACCO USER: Status: ACTIVE | Noted: 2020-02-18

## 2021-06-16 PROBLEM — G93.32 CHRONIC FATIGUE SYNDROME: Status: ACTIVE | Noted: 2020-02-18

## 2021-06-16 PROBLEM — E03.9 HYPOTHYROIDISM: Status: ACTIVE | Noted: 2020-02-18

## 2021-06-16 PROBLEM — E66.811 OBESITY (BMI 30.0-34.9): Status: ACTIVE | Noted: 2020-04-22

## 2021-06-16 PROBLEM — N62 MACROMASTIA: Status: ACTIVE | Noted: 2020-02-18

## 2021-06-16 PROBLEM — K21.9 GASTROESOPHAGEAL REFLUX DISEASE: Status: ACTIVE | Noted: 2020-04-22

## 2021-06-16 PROBLEM — U07.1 COVID-19: Status: ACTIVE | Noted: 2020-08-16

## 2021-06-26 ENCOUNTER — HEALTH MAINTENANCE LETTER (OUTPATIENT)
Age: 66
End: 2021-06-26

## 2021-07-13 ENCOUNTER — RECORDS - HEALTHEAST (OUTPATIENT)
Dept: ADMINISTRATIVE | Facility: CLINIC | Age: 66
End: 2021-07-13

## 2021-07-14 ENCOUNTER — LAB REQUISITION (OUTPATIENT)
Dept: LAB | Facility: CLINIC | Age: 66
End: 2021-07-14

## 2021-07-14 DIAGNOSIS — E78.5 HYPERLIPIDEMIA, UNSPECIFIED: ICD-10-CM

## 2021-07-14 DIAGNOSIS — I10 ESSENTIAL (PRIMARY) HYPERTENSION: ICD-10-CM

## 2021-07-14 LAB
ALBUMIN SERPL-MCNC: 3.7 G/DL (ref 3.5–5)
ALP SERPL-CCNC: 66 U/L (ref 45–120)
ALT SERPL W P-5'-P-CCNC: 15 U/L (ref 0–45)
ANION GAP SERPL CALCULATED.3IONS-SCNC: 13 MMOL/L (ref 5–18)
AST SERPL W P-5'-P-CCNC: 14 U/L (ref 0–40)
BILIRUB SERPL-MCNC: 1.1 MG/DL (ref 0–1)
BUN SERPL-MCNC: 28 MG/DL (ref 8–22)
CALCIUM SERPL-MCNC: 9.4 MG/DL (ref 8.5–10.5)
CHLORIDE BLD-SCNC: 101 MMOL/L (ref 98–107)
CHOLEST SERPL-MCNC: 166 MG/DL
CO2 SERPL-SCNC: 24 MMOL/L (ref 22–31)
CREAT SERPL-MCNC: 1.43 MG/DL (ref 0.6–1.1)
GFR SERPL CREATININE-BSD FRML MDRD: 38 ML/MIN/1.73M2
GLUCOSE BLD-MCNC: 268 MG/DL (ref 70–125)
HDLC SERPL-MCNC: 42 MG/DL
LDLC SERPL CALC-MCNC: 74 MG/DL
LDLC SERPL CALC-MCNC: ABNORMAL MG/DL
POTASSIUM BLD-SCNC: 5.1 MMOL/L (ref 3.5–5)
PROT SERPL-MCNC: 6.5 G/DL (ref 6–8)
SODIUM SERPL-SCNC: 138 MMOL/L (ref 136–145)
TRIGL SERPL-MCNC: 405 MG/DL

## 2021-07-14 PROCEDURE — 82040 ASSAY OF SERUM ALBUMIN: CPT | Performed by: PHYSICIAN ASSISTANT

## 2021-07-14 PROCEDURE — 80061 LIPID PANEL: CPT | Performed by: PHYSICIAN ASSISTANT

## 2021-07-14 PROCEDURE — 83721 ASSAY OF BLOOD LIPOPROTEIN: CPT | Performed by: PHYSICIAN ASSISTANT

## 2021-07-21 ENCOUNTER — RECORDS - HEALTHEAST (OUTPATIENT)
Dept: ADMINISTRATIVE | Facility: CLINIC | Age: 66
End: 2021-07-21

## 2021-07-22 ENCOUNTER — RECORDS - HEALTHEAST (OUTPATIENT)
Dept: SCHEDULING | Facility: CLINIC | Age: 66
End: 2021-07-22

## 2021-07-22 DIAGNOSIS — Z12.31 OTHER SCREENING MAMMOGRAM: ICD-10-CM

## 2021-07-23 ENCOUNTER — RECORDS - HEALTHEAST (OUTPATIENT)
Dept: ADMINISTRATIVE | Facility: CLINIC | Age: 66
End: 2021-07-23

## 2021-10-16 ENCOUNTER — HEALTH MAINTENANCE LETTER (OUTPATIENT)
Age: 66
End: 2021-10-16

## 2021-11-22 NOTE — PROGRESS NOTES
HPI:  Mariama Keene is a 64 y.o. year old female with weight related co-morbidities of   Patient Active Problem List   Diagnosis     Diabetes Mellitus Poorly Controlled     Hyperlipidemia     Obesity     Hypertension     Esophageal Reflux     Anxiety     Chronic fatigue syndrome     Hypothyroidism     Long term current use of insulin (H)     Macromastia     Polyneuropathy due to type 2 diabetes mellitus (H)     Recurrent major depression in full remission (H)     Tobacco user     Vitamin D deficiency    who presents for medical bariatric consultation in the setting of weight related co-morbidities.  Her BMI is Body mass index is 34.93 kg/m ..      Assessment: Mariama is a 64 y.o. year old female who presents for medical weight management.      Plan:    1. Obesity (H)  We discussed healthy habits to assist with weight loss. She is going to consider ordering her groceries online to prevent last minute bad choices. She will work on planning her meals ahead of time and will continue doing her exercises. I referred her to [facility] for help with emotional eating. We discussed medication that may assist with weight loss. Topamax was prescribed. Risks/ benefits and possible side effects were discussed and questions were answered. Written information was given. She is not sure she will fill it.     2. Benign essential hypertension  This may improve with healthy habits and weight loss.    3. Type 2 diabetes mellitus with microalbuminuria, unspecified whether long term insulin use (H)  This may improve with healthy habits and weight loss. She is overdue for a follow up visit with Dr. Engel and plans to schedule this before he retires.     4. Gastroesophageal reflux disease, esophagitis presence not specified  This may improve with healthy habits and weight loss.        Follow up: In 1 week with our dietician and in 3 months with myself      >60 minutes spent with patient, > 50% spent in counseling          Counseling:  We  discussed [facility] Bariatric Basics including:  -eating 3 meals daily  -eating protein first  -eating slowly, chewing food well  -avoiding/limiting calorie containing beverages  -choosing wheat, not white with breads, crackers, pastas, rodriguez, bagels, tortillas, rice  -limiting carbohydrates in general  -limiting restaurant or cafeteria eating to twice a week or less    We discussed the importance of restorative sleep and stress management in maintaining a healthy weight.    We reviewed medications associated with weight gain.    We discussed insulin resistance and glycemic index as it relates to appetite and weight control.     We discussed the National Weight Control Registry healthy weight maintenance strategies and ways to optimize metabolism.  We discussed the importance of physical activity including cardiovascular and strength training in maintaining a healthier weight and explored viable options.    We discussed medications available for weight loss including phentermine, phendimetrazine, topamax, qsymia, lorcaserin, contrave, diethylproprion, and orlistat. We discussed the risks and benefits of each. We discussed indications, contraindications, potential side effects, and estimated costs of each. Literature was offered.    History Surrouding Consultation:  She has had several past supervised and unsupervised weight loss attempts  The most weight lost was: UNSURE- SHE DOES WELL WHEN SHE IS MINDFULL  Unfortunately there was not durable weight maintenance.  History of bulimia, anorexia, or binge eating disorder? no  If Present has eating disorder been in remission at least 3 years? no    Dietary History  Meals per day: 2  Snacks: yes  Typical Snack: candy- loves chocolate- all sweets  Who does the grocery shopping? patient  Who does the cooking? patient  A typical meal includes:No schedule B: skips or 1-2 pieces of white toast L:snacks or skips or eggs until recently (now hates eggs) and ndiaye and fruit  D:  susan, tries to stir in some green pepper or onions  Regular Pop: diet only- a few cans a week  Juice: no  Caffeine: coffee 2-3 cups per day, usually black, diet coke  Amount of restaurant eating per week: 3      Physical Activity Patterns  Current physical activity routine includes: gym right next to her building, her son brought her through a weight program last week, worked up to 9 minutes on the stationary bike    Limitations from being physically active on a regular basis includes: motivation, leg pain due to ?neuropathy- wheel chair bound at one point- now has weakness        PMH:  Past Medical History:   Diagnosis Date     Asthma      Diabetes (H)      Hypertension      Panic attacks        Past Surgical Hx:  Past Surgical History:   Procedure Laterality Date     ABSCESS DRAINAGE      On back     HYSTERECTOMY      age 33 - partial     OOPHORECTOMY      age 48     PARTIAL NEPHRECTOMY Right        Medication:  Current Outpatient Medications on File Prior to Visit   Medication Sig Dispense Refill     glipiZIDE-metFORMIN (METAGLIP) 5-500 mg per tablet Take 2 tablets by mouth 2 (two) times a day.        levothyroxine (SYNTHROID) 75 MCG tablet Take 1 tablet by mouth daily.        metoprolol tartrate (LOPRESSOR) 50 MG tablet Take 2 tablets by mouth 2 (two) times a day.        omeprazole (PRILOSEC) 20 MG capsule Take 1 capsule by mouth daily.        rosuvastatin (CRESTOR) 5 MG tablet Take 1 tablet by mouth daily.        No current facility-administered medications on file prior to visit.        Allergies:Alprazolam; Cephalexin; Exenatide; Iodinated contrast media; Lisinopril; Nitrofurantoin monohyd/m-cryst; and Sulfa (sulfonamide antibiotics)    Family Hx:  Family History   Problem Relation Age of Onset     Breast cancer Maternal Aunt 45     Alzheimer's disease Mother      Hypertension Mother      COPD Father        Social Hx:  Social History     Socioeconomic History     Marital status:      Spouse name:  Not on file     Number of children: Not on file     Years of education: Not on file     Highest education level: Not on file   Occupational History     Not on file   Social Needs     Financial resource strain: Not on file     Food insecurity:     Worry: Not on file     Inability: Not on file     Transportation needs:     Medical: Not on file     Non-medical: Not on file   Tobacco Use     Smoking status: Former Smoker     Last attempt to quit:      Years since quittin.1     Smokeless tobacco: Never Used   Substance and Sexual Activity     Alcohol use: Yes     Drug use: Not on file     Sexual activity: Not on file   Lifestyle     Physical activity:     Days per week: Not on file     Minutes per session: Not on file     Stress: Not on file   Relationships     Social connections:     Talks on phone: Not on file     Gets together: Not on file     Attends Gnosticism service: Not on file     Active member of club or organization: Not on file     Attends meetings of clubs or organizations: Not on file     Relationship status: Not on file     Intimate partner violence:     Fear of current or ex partner: Not on file     Emotionally abused: Not on file     Physically abused: Not on file     Forced sexual activity: Not on file   Other Topics Concern     Not on file   Social History Narrative     Not on file       Tobacco Use Hx:  Social History     Tobacco Use   Smoking Status Former Smoker     Last attempt to quit:      Years since quittin.1   Smokeless Tobacco Never Used       ROS  General  Fatigue: yes  Sleep Quality:poor- insomnia  HEENT  Hx of glaucoma: no  Vision changes: no  Cardiovascular  Chest Pain with Exertion: no  Palpitations: occadionally  Hx of heart disease: no  Pulmonary  Shortness of breath at rest: no  Shortness of breath with exertion: yes  Snoring: yes  Stop-bang score: 4  Colmar Score: 11  Gastrointestinal  Heartburn: yes  Abdominal pain: no  Psychiatric  Moods Stable:  "yes  Endocrine  Polydipsia: no  Polyuria: yes  Neurologic:  Hx of seizures: no  Dermatologic  Rashes: no      Resp 18   Ht 5' 2\" (1.575 m)   Wt 191 lb (86.6 kg)   SpO2 98%   BMI 34.93 kg/m    Wt Readings from Last 2 Encounters:   02/18/20 191 lb (86.6 kg)   12/19/18 180 lb (81.6 kg)     Body mass index is 34.93 kg/m .  Neck circumference/Waist Circumference: Height: 5' 2\" (1.575 m) (2/18/2020  8:10 AM)  Weight: 191 lb (86.6 kg) (2/18/2020  8:10 AM)  BMI (Calculated): 34.9 (2/18/2020  8:10 AM)  SpO2: 98 % (2/18/2020  8:10 AM)      General Appearance  No acute distress. Obesity: yes  Alert: yes  Neck  Stout:   Cardiovascular  Rhythm regular  Murmur: no  Pulmonary  Lungs clear to ascultation  Extremities:  Pitting edema: no  Psychiatric  Thought Content Organized  Moods stable  Endocrine  Skin Tags: no  Acanthosis nigricans: mild      Labs:    Lab Results   Component Value Date    WBC 7.7 10/08/2015    HGB 13.3 10/08/2015    HCT 36.7 10/08/2015    MCV 93 10/08/2015     (L) 10/08/2015     Lab Results   Component Value Date    CHOL 254 (H) 01/24/2019    CHOL 148 12/07/2017    CHOL 221 (H) 02/16/2017     Lab Results   Component Value Date    HDL 47 (L) 01/24/2019    HDL 40 (L) 12/07/2017    HDL 39 (L) 02/16/2017     Lab Results   Component Value Date    LDLCALC  01/24/2019      Comment:      Invalid, Triglycerides >400    LDLCALC 61 12/07/2017    LDLCALC 113 02/16/2017     Lab Results   Component Value Date    TRIG 452 (H) 01/24/2019    TRIG 237 (H) 12/07/2017    TRIG 344 (H) 02/16/2017     No components found for: CHOLHDL  No results found for: ALT, AST, GGT, ALKPHOS, BILITOT  No results found for: HGBA1C  Lab Results   Component Value Date    [id number] 285 12/07/2017       Much or all of the text in this note was generated through the use of Dragon Dictate voice-to-text software. Errors in spelling or words which seem out of context are unintentional. Sound alike errors, in particular, may have escaped " COPD/asthma ( on 3L NC), DM2, HTN, SHAD on CPAP, lymphedema on bumex, morbid obesity (BMI 52.7); on 3L NC  11/19 CXR Moderate perihilar opacities may represent infiltrate in the correct clinical context. Differential diagnosis includes chronic interstitial lung disease.  no fracture or pneumothorax editing.   PMH GERD, COPD/asthma ( on 3L NC), DM2, HTN, SHAD on CPAP, lymphedema on bumex, morbid obesity (BMI 52.7)  11/19 CXR Moderate perihilar opacities may represent infiltrate in the correct clinical context. Differential diagnosis includes chronic interstitial lung disease; no fracture or pneumothorax  11/22 MD note Pt reports she is tired of being in the hospital and sick all the time; -pt family requesting to be transferred to [facility] for further management by her primary orthopedic surgeon, PER FAMILY'S REQUEST pt did not accept soft solids stating it was too hard

## 2022-02-05 ENCOUNTER — HEALTH MAINTENANCE LETTER (OUTPATIENT)
Age: 67
End: 2022-02-05

## 2022-03-16 ENCOUNTER — LAB REQUISITION (OUTPATIENT)
Dept: LAB | Facility: CLINIC | Age: 67
End: 2022-03-16

## 2022-03-16 DIAGNOSIS — I10 ESSENTIAL (PRIMARY) HYPERTENSION: ICD-10-CM

## 2022-03-16 DIAGNOSIS — E55.9 VITAMIN D DEFICIENCY, UNSPECIFIED: ICD-10-CM

## 2022-03-16 DIAGNOSIS — E78.5 HYPERLIPIDEMIA, UNSPECIFIED: ICD-10-CM

## 2022-03-16 DIAGNOSIS — E03.9 HYPOTHYROIDISM, UNSPECIFIED: ICD-10-CM

## 2022-03-16 LAB
ALBUMIN SERPL-MCNC: 3.5 G/DL (ref 3.5–5)
ALP SERPL-CCNC: 67 U/L (ref 45–120)
ALT SERPL W P-5'-P-CCNC: 11 U/L (ref 0–45)
ANION GAP SERPL CALCULATED.3IONS-SCNC: 15 MMOL/L (ref 5–18)
AST SERPL W P-5'-P-CCNC: 13 U/L (ref 0–40)
BILIRUB SERPL-MCNC: 1 MG/DL (ref 0–1)
BUN SERPL-MCNC: 29 MG/DL (ref 8–22)
CALCIUM SERPL-MCNC: 9.4 MG/DL (ref 8.5–10.5)
CHLORIDE BLD-SCNC: 102 MMOL/L (ref 98–107)
CHOLEST SERPL-MCNC: 151 MG/DL
CO2 SERPL-SCNC: 25 MMOL/L (ref 22–31)
CREAT SERPL-MCNC: 1.86 MG/DL (ref 0.6–1.1)
FASTING STATUS PATIENT QL REPORTED: ABNORMAL
GFR SERPL CREATININE-BSD FRML MDRD: 29 ML/MIN/1.73M2
GLUCOSE BLD-MCNC: 169 MG/DL (ref 70–125)
HDLC SERPL-MCNC: 42 MG/DL
LDLC SERPL CALC-MCNC: 58 MG/DL
POTASSIUM BLD-SCNC: 4.5 MMOL/L (ref 3.5–5)
PROT SERPL-MCNC: 6.5 G/DL (ref 6–8)
SODIUM SERPL-SCNC: 142 MMOL/L (ref 136–145)
T4 FREE SERPL-MCNC: 0.79 NG/DL (ref 0.7–1.8)
TRIGL SERPL-MCNC: 255 MG/DL
TSH SERPL DL<=0.005 MIU/L-ACNC: 5.88 UIU/ML (ref 0.3–5)

## 2022-03-16 PROCEDURE — 82040 ASSAY OF SERUM ALBUMIN: CPT | Performed by: PHYSICIAN ASSISTANT

## 2022-03-16 PROCEDURE — 82306 VITAMIN D 25 HYDROXY: CPT | Performed by: PHYSICIAN ASSISTANT

## 2022-03-16 PROCEDURE — 80053 COMPREHEN METABOLIC PANEL: CPT | Performed by: PHYSICIAN ASSISTANT

## 2022-03-16 PROCEDURE — 84439 ASSAY OF FREE THYROXINE: CPT | Performed by: PHYSICIAN ASSISTANT

## 2022-03-16 PROCEDURE — 84443 ASSAY THYROID STIM HORMONE: CPT | Performed by: PHYSICIAN ASSISTANT

## 2022-03-16 PROCEDURE — 80061 LIPID PANEL: CPT | Performed by: PHYSICIAN ASSISTANT

## 2022-03-17 LAB — DEPRECATED CALCIDIOL+CALCIFEROL SERPL-MC: 17 UG/L (ref 30–80)

## 2022-06-17 ENCOUNTER — HOSPITAL ENCOUNTER (EMERGENCY)
Facility: HOSPITAL | Age: 67
Discharge: HOME | End: 2022-06-17
Attending: EMERGENCY MEDICINE | Admitting: EMERGENCY MEDICINE
Payer: COMMERCIAL

## 2022-06-17 ENCOUNTER — APPOINTMENT (OUTPATIENT)
Dept: RADIOLOGY | Facility: HOSPITAL | Age: 67
End: 2022-06-17
Attending: EMERGENCY MEDICINE
Payer: COMMERCIAL

## 2022-06-17 VITALS
WEIGHT: 184 LBS | TEMPERATURE: 98.3 F | HEIGHT: 63 IN | BODY MASS INDEX: 32.6 KG/M2 | SYSTOLIC BLOOD PRESSURE: 169 MMHG | OXYGEN SATURATION: 94 % | HEART RATE: 67 BPM | RESPIRATION RATE: 21 BRPM | DIASTOLIC BLOOD PRESSURE: 77 MMHG

## 2022-06-17 DIAGNOSIS — R00.2 PALPITATIONS: ICD-10-CM

## 2022-06-17 DIAGNOSIS — I49.1 PAC (PREMATURE ATRIAL CONTRACTION): ICD-10-CM

## 2022-06-17 DIAGNOSIS — I49.8 SINUS ARRHYTHMIA: ICD-10-CM

## 2022-06-17 LAB
ALBUMIN SERPL-MCNC: 3.6 G/DL (ref 3.5–5)
ALP SERPL-CCNC: 75 U/L (ref 45–120)
ALT SERPL W P-5'-P-CCNC: 16 U/L (ref 0–45)
ANION GAP SERPL CALCULATED.3IONS-SCNC: 12 MMOL/L (ref 5–18)
AST SERPL W P-5'-P-CCNC: 15 U/L (ref 0–40)
BASOPHILS # BLD AUTO: 0 10E3/UL (ref 0–0.2)
BASOPHILS NFR BLD AUTO: 0 %
BILIRUB SERPL-MCNC: 0.5 MG/DL (ref 0–1)
BUN SERPL-MCNC: 39 MG/DL (ref 8–22)
CALCIUM SERPL-MCNC: 9.2 MG/DL (ref 8.5–10.5)
CHLORIDE BLD-SCNC: 106 MMOL/L (ref 98–107)
CO2 SERPL-SCNC: 23 MMOL/L (ref 22–31)
CREAT SERPL-MCNC: 1.87 MG/DL (ref 0.6–1.1)
EOSINOPHIL # BLD AUTO: 0.1 10E3/UL (ref 0–0.7)
EOSINOPHIL NFR BLD AUTO: 2 %
ERYTHROCYTE [DISTWIDTH] IN BLOOD BY AUTOMATED COUNT: 12.5 % (ref 10–15)
GFR SERPL CREATININE-BSD FRML MDRD: 29 ML/MIN/1.73M2
GLUCOSE BLD-MCNC: 188 MG/DL (ref 70–125)
HCT VFR BLD AUTO: 41.3 % (ref 35–47)
HGB BLD-MCNC: 14.2 G/DL (ref 11.7–15.7)
IMM GRANULOCYTES # BLD: 0 10E3/UL
IMM GRANULOCYTES NFR BLD: 0 %
LYMPHOCYTES # BLD AUTO: 1.3 10E3/UL (ref 0.8–5.3)
LYMPHOCYTES NFR BLD AUTO: 18 %
MAGNESIUM SERPL-MCNC: 1.8 MG/DL (ref 1.8–2.6)
MCH RBC QN AUTO: 31.3 PG (ref 26.5–33)
MCHC RBC AUTO-ENTMCNC: 34.4 G/DL (ref 31.5–36.5)
MCV RBC AUTO: 91 FL (ref 78–100)
MONOCYTES # BLD AUTO: 0.5 10E3/UL (ref 0–1.3)
MONOCYTES NFR BLD AUTO: 7 %
NEUTROPHILS # BLD AUTO: 5.2 10E3/UL (ref 1.6–8.3)
NEUTROPHILS NFR BLD AUTO: 73 %
NRBC # BLD AUTO: 0 10E3/UL
NRBC BLD AUTO-RTO: 0 /100
PLATELET # BLD AUTO: 171 10E3/UL (ref 150–450)
POTASSIUM BLD-SCNC: 4 MMOL/L (ref 3.5–5)
PROT SERPL-MCNC: 7.1 G/DL (ref 6–8)
RBC # BLD AUTO: 4.53 10E6/UL (ref 3.8–5.2)
SODIUM SERPL-SCNC: 141 MMOL/L (ref 136–145)
TROPONIN I SERPL-MCNC: 0.02 NG/ML (ref 0–0.29)
WBC # BLD AUTO: 7.2 10E3/UL (ref 4–11)

## 2022-06-17 PROCEDURE — 93005 ELECTROCARDIOGRAM TRACING: CPT | Performed by: STUDENT IN AN ORGANIZED HEALTH CARE EDUCATION/TRAINING PROGRAM

## 2022-06-17 PROCEDURE — 99285 EMERGENCY DEPT VISIT HI MDM: CPT | Mod: 25

## 2022-06-17 PROCEDURE — 71045 X-RAY EXAM CHEST 1 VIEW: CPT

## 2022-06-17 PROCEDURE — 82040 ASSAY OF SERUM ALBUMIN: CPT | Performed by: EMERGENCY MEDICINE

## 2022-06-17 PROCEDURE — 83735 ASSAY OF MAGNESIUM: CPT | Performed by: EMERGENCY MEDICINE

## 2022-06-17 PROCEDURE — 36415 COLL VENOUS BLD VENIPUNCTURE: CPT | Performed by: EMERGENCY MEDICINE

## 2022-06-17 PROCEDURE — 84484 ASSAY OF TROPONIN QUANT: CPT | Performed by: EMERGENCY MEDICINE

## 2022-06-17 PROCEDURE — 85004 AUTOMATED DIFF WBC COUNT: CPT | Performed by: EMERGENCY MEDICINE

## 2022-06-17 PROCEDURE — 80053 COMPREHEN METABOLIC PANEL: CPT | Performed by: EMERGENCY MEDICINE

## 2022-06-17 ASSESSMENT — ENCOUNTER SYMPTOMS
PALPITATIONS: 1
SHORTNESS OF BREATH: 1
LIGHT-HEADEDNESS: 1
COUGH: 1

## 2022-06-17 NOTE — ED NOTES
Patient states she has been having an irregular heart for about 1 month. States that she also has a history of major panic attacks since 16. Has been trying deep breathing. States she laid down earlier and felt like she was going to pass out. Has a history of HTN. Patient is crying while writer is asking questions.

## 2022-06-17 NOTE — ED TRIAGE NOTES
"Pt presents with irregular heart rate and states she feels \"weird' with feeling heart beat in neck and is grabbing left arm but denies pain.  Pt states this has been going on for 2 months     Triage Assessment     Row Name 06/17/22 0030       Triage Assessment (Adult)    Airway WDL WDL       Respiratory WDL    Respiratory WDL WDL       Skin Circulation/Temperature WDL    Skin Circulation/Temperature WDL WDL       Cardiac WDL    Cardiac WDL WDL  denies pain but irregular heart beat is making her feel anxious       Peripheral/Neurovascular WDL    Peripheral Neurovascular WDL WDL       Cognitive/Neuro/Behavioral WDL    Cognitive/Neuro/Behavioral WDL WDL              "

## 2022-06-17 NOTE — ED PROVIDER NOTES
NAME: Mariama Keene  AGE: 66 year old female  YOB: 1955  MRN: [id number]  EVALUATION DATE & TIME: 2022 12:34 AM    PCP: Reza Engel    ED PROVIDER: Len Hargrove M.D.      Chief Complaint   Patient presents with     Irregular Heart Beat         FINAL IMPRESSION:  1. Palpitations    2. Sinus arrhythmia    3. PAC (premature atrial contraction)        MEDICAL DECISION MAKIN:55 AM I met with the patient, obtained history, performed an initial exam, and discussed options and plan for diagnostics and treatment here in the ED.   2:35 AM I rechecked and updated the patient   2:53 AM We discussed the plan for discharge and the patient is agreeable. Reviewed supportive cares, symptomatic treatment, outpatient follow up, and reasons to return to the Emergency Department. All questions and concerns were addressed. Patient to be discharged by ED RN.      Patient was clinically assessed and consented to treatment. After assessment, medical decision making and workup were discussed with the patient. The patient was agreeable to plan for testing, workup, and treatment.  Pertinent Labs & Imaging studies reviewed. (See chart for details)     Mariama Keene is a 66 year old female who presents with irregular heartbeat.   Differential diagnosis includes but not limited to atrial fibrillation, SVT, PVCs, PACs, sinus arrhythmia, acute coronary syndrome.  Patient with palpitations.  She reports irregular heartbeat.  No chest pain, no shortness of breath, no other acute symptoms.  She is reported this has been going on for a month but presenting tonight.  EKG does show some sinus arrhythmia with PACs followed by pause.  This is fairly consistent on EKG and then on rhythm strip on observation.  Patient also very anxious and elevated blood pressure as well.  After reassurance of no acute ischemia on EKG patient did seem to calm down and blood pressures are coming down some.  This might be related to  stress and elevated blood pressure triggering some PACs.  Patient had rhythm strip done which did not reveal any acute pathologic rhythms such as atrial fibrillation or third-degree block.  Possibly second-degree AV block however on rhythm strip checked there was clear P waves but no P waves with dropped beat.  Labs obtained and showed no troponin elevation, CBC unremarkable, and metabolic panel revealed elevated creatinine consistent with chronic kidney disease which she has known of.  Following patient calling and with reassurance in the room her heart rate came down to the 60s and on observation from monitor while patient was resting comfortably she seemed to stay in normal sinus rhythm with no further arrhythmias or PACs.  When I went in to talk to patient she did seem to develop a few of these PACs but not as frequent or as consistent as earlier.  Possibly related to stress or cardiac irritation however no inciting event can be found nor excessive caffeine, alcohol or other findings on questioning.  Patient's had this ongoing for months and I feel patient will be safe to discharge home with rapid access follow-up.  I would recommend patient get Holter monitor placed to monitor this and discussed with cardiology further control of palpitations.  Patient currently takes metoprolol but possibly did not require increased dose or alternate medication for prevention of these PACs which she seems symptomatic from.  Patient feeling better and comfortable now having less symptoms will be discharged home with follow-up to rapid access.    0 minutes of critical care time    MEDICATIONS GIVEN IN THE EMERGENCY:  Medications - No data to display    NEW PRESCRIPTIONS STARTED AT TODAY'S ER VISIT:  Discharge Medication List as of 6/17/2022  2:52 AM             =================================================================    HPI    Patient information was obtained from: patient     Use of : N/A         Mariama BELLO  "Yecenia is a 66 year old female with a past medical history of diabetes, panic attacks, hypertension, hyperlipidemia, and anxiety, who presents to the ED for evaluation of irregular heart beat    For the past month, the patient has been having irregular heartbeats and palpitations. She also endorses a productive cough for the last month. Today at 9am, her symptoms got worse with a more \"rapid\" heartbeat and shortness of breath with exertion. She also felt lightheaded and thought she was going to pass out. The patient reports that she recently started insulin for her diabetes and notes that she went to the movies today and ate a lot of popcorn. She also reports that she drank some coffee and iced tea early this morning.     The patient notes that she had these symptoms before with her panic attacks or when her back gives out.     She denies drinking alcohol. The patient is taking all her medications as prescribed.     REVIEW OF SYSTEMS   Review of Systems   Respiratory: Positive for cough (productive) and shortness of breath (exertional).    Cardiovascular: Positive for palpitations.   Neurological: Positive for light-headedness.   All other systems reviewed and are negative.       PAST MEDICAL HISTORY:  Past Medical History:   Diagnosis Date     Asthma      Asthma      Asthma      Diabetes (H)      Diabetes (H)      Diabetes (H)      Hypertension      Hypertension      Hypertension      Panic attacks      Panic attacks      Panic attacks        PAST SURGICAL HISTORY:  Past Surgical History:   Procedure Laterality Date     ABSCESS DRAINAGE      On back     HYSTERECTOMY      age 33 - partial     NEPHRECTOMY PARTIAL Right      OOPHORECTOMY      age 48       CURRENT MEDICATIONS:    No current facility-administered medications for this encounter.    Current Outpatient Medications:      albuterol (PROAIR HFA;PROVENTIL HFA;VENTOLIN HFA) 90 mcg/actuation inhaler, [ALBUTEROL (PROAIR HFA;PROVENTIL HFA;VENTOLIN HFA) 90 " MCG/ACTUATION INHALER] Inhale 2 puffs every 4 (four) hours as needed for wheezing (bronchospasm)., Disp: 1 Inhaler, Rfl: 0     apixaban ANTICOAGULANT (ELIQUIS) 2.5 mg Tab tablet, [APIXABAN ANTICOAGULANT (ELIQUIS) 2.5 MG TAB TABLET] Take 1 tablet (2.5 mg total) by mouth 2 (two) times a day., Disp: 60 tablet, Rfl: 0     glipiZIDE-metFORMIN (METAGLIP) 5-500 mg per tablet, [GLIPIZIDE-METFORMIN (METAGLIP) 5-500 MG PER TABLET] Take 2 tablets by mouth 2 (two) times a day., Disp: 120 tablet, Rfl: 0     hydroCHLOROthiazide (MICROZIDE) 12.5 mg capsule, [HYDROCHLOROTHIAZIDE (MICROZIDE) 12.5 MG CAPSULE] Take 1 capsule (12.5 mg total) by mouth daily., Disp: 30 capsule, Rfl: 0     irbesartan (AVAPRO) 300 MG tablet, [IRBESARTAN (AVAPRO) 300 MG TABLET] Take 1 tablet (300 mg total) by mouth daily., Disp: 30 tablet, Rfl: 0     LEVEMIR U-100 INSULIN 100 unit/mL injection, [LEVEMIR U-100 INSULIN 100 UNIT/ML INJECTION] Inject 45 Units under the skin at bedtime. 11.9 Type 2 without complications, Disp: 10 mL, Rfl: PRN     levothyroxine (SYNTHROID) 75 MCG tablet, [LEVOTHYROXINE (SYNTHROID) 75 MCG TABLET] Take 1 tablet (75 mcg total) by mouth Daily at 6:00 am., Disp: 30 tablet, Rfl: 0     metoprolol tartrate (LOPRESSOR) 50 MG tablet, [METOPROLOL TARTRATE (LOPRESSOR) 50 MG TABLET] Take 2 tablets (100 mg total) by mouth 2 (two) times a day., Disp: 60 tablet, Rfl: 0     omeprazole (PRILOSEC) 20 MG capsule, [OMEPRAZOLE (PRILOSEC) 20 MG CAPSULE] Take 1 capsule (20 mg total) by mouth daily before breakfast., Disp: 30 capsule, Rfl: 0     rosuvastatin (CRESTOR) 5 MG tablet, [ROSUVASTATIN (CRESTOR) 5 MG TABLET] Take 1 tablet (5 mg total) by mouth daily., Disp: 30 tablet, Rfl: 0     zinc 50 mg Tab, [ZINC 50 MG TAB] Take 50 mg by mouth daily., Disp: , Rfl: 0    ALLERGIES:  Allergies   Allergen Reactions     Alprazolam Other (See Comments)     Cephalexin Rash and Swelling     Exenatide Other (See Comments)     Iodinated Contrast Media [Diagnostic  "X-Ray Materials] Unknown     Lisinopril Other (See Comments)     Nitrofurantoin Monohyd/M-Cryst [Nitrofurantoin] Rash     Sulfa (Sulfonamide Antibiotics) [Sulfa Drugs] Other (See Comments)       FAMILY HISTORY:  Family History   Problem Relation Age of Onset     Breast Cancer Maternal Aunt 45.00     Alzheimer Disease Mother      Hypertension Mother      Chronic Obstructive Pulmonary Disease Father        SOCIAL HISTORY:   Social History     Socioeconomic History     Marital status:    Tobacco Use     Smoking status: Former Smoker     Quit date: 1992     Years since quittin.4     Smokeless tobacco: Never Used   Substance and Sexual Activity     Alcohol use: Yes     Drug use: Never     Sexual activity: Not Currently     Birth control/protection: Surgical       PHYSICAL EXAM:    Vitals: BP (!) 169/77   Pulse 67   Temp 98.3  F (36.8  C) (Temporal)   Resp 21   Ht 1.6 m (5' 3\")   Wt 83.5 kg (184 lb)   SpO2 94%   BMI 32.59 kg/m     Physical Exam  Vitals and nursing note reviewed.   Constitutional:       General: She is not in acute distress.     Appearance: Normal appearance. She is normal weight. She is not ill-appearing or toxic-appearing.   HENT:      Head: Normocephalic.   Cardiovascular:      Rate and Rhythm: Normal rate. Rhythm irregular.      Heart sounds: Normal heart sounds.   Pulmonary:      Effort: Pulmonary effort is normal. No respiratory distress.      Breath sounds: Normal breath sounds.   Abdominal:      Palpations: Abdomen is soft.      Tenderness: There is no abdominal tenderness.   Musculoskeletal:         General: Normal range of motion.      Cervical back: Normal range of motion.      Right lower leg: No edema.      Left lower leg: No edema.   Skin:     General: Skin is warm.      Coloration: Skin is not pale.   Neurological:      General: No focal deficit present.      Mental Status: She is alert.   Psychiatric:         Mood and Affect: Mood is anxious.         Behavior: " Behavior normal.           LAB:  All pertinent labs reviewed and interpreted.  Labs Ordered and Resulted from Time of ED Arrival to Time of ED Departure   COMPREHENSIVE METABOLIC PANEL - Abnormal       Result Value    Sodium 141      Potassium 4.0      Chloride 106      Carbon Dioxide (CO2) 23      Anion Gap 12      Urea Nitrogen 39 (*)     Creatinine 1.87 (*)     Calcium 9.2      Glucose 188 (*)     Alkaline Phosphatase 75      AST 15      ALT 16      Protein Total 7.1      Albumin 3.6      Bilirubin Total 0.5      GFR Estimate 29 (*)    TROPONIN I - Normal    Troponin I 0.02     MAGNESIUM - Normal    Magnesium 1.8     CBC WITH PLATELETS AND DIFFERENTIAL    WBC Count 7.2      RBC Count 4.53      Hemoglobin 14.2      Hematocrit 41.3      MCV 91      MCH 31.3      MCHC 34.4      RDW 12.5      Platelet Count 171      % Neutrophils 73      % Lymphocytes 18      % Monocytes 7      % Eosinophils 2      % Basophils 0      % Immature Granulocytes 0      NRBCs per 100 WBC 0      Absolute Neutrophils 5.2      Absolute Lymphocytes 1.3      Absolute Monocytes 0.5      Absolute Eosinophils 0.1      Absolute Basophils 0.0      Absolute Immature Granulocytes 0.0      Absolute NRBCs 0.0         RADIOLOGY:  XR Chest Port 1 View   Final Result   IMPRESSION: Negative chest.          EKG:   Performed at: 12:42 AM on June 17, 2022  Impression: Sinus rhythm with sinus arrhythmia, no signs of acute ST elevation ischemia.  Rate: 83 bpm  Rhythm: Sinus rhythm with sinus arrhythmia  QRS Interval: 92 ms  QTc Interval: 495 ms  Comparison: Comparison prior EKG from August 16, 2020 with similar morphology but arrhythmia now noted with frequent PACs.  I have independently reviewed and interpreted the EKG(s) documented above.     PROCEDURES:   Procedures       Alexa DYER, am serving as a scribe to document services personally performed by Dr. Len Hargrove  based on my observation and the provider's statements to me. ILen,  MD attest that Alexa Lees is acting in a scribe capacity, has observed my performance of the services and has documented them in accordance with my direction.      Len Hargrove M.D.  Emergency Medicine  [facility] Emergency Department     Len Hargrove MD  06/17/22 [id number]

## 2022-06-19 LAB
ATRIAL RATE - MUSE: 83 BPM
DIASTOLIC BLOOD PRESSURE - MUSE: NORMAL MMHG
INTERPRETATION ECG - MUSE: NORMAL
P AXIS - MUSE: 56 DEGREES
PR INTERVAL - MUSE: 178 MS
QRS DURATION - MUSE: 92 MS
QT - MUSE: 422 MS
QTC - MUSE: 495 MS
R AXIS - MUSE: -48 DEGREES
SYSTOLIC BLOOD PRESSURE - MUSE: NORMAL MMHG
T AXIS - MUSE: 87 DEGREES
VENTRICULAR RATE- MUSE: 83 BPM

## 2022-06-29 ENCOUNTER — OFFICE VISIT (OUTPATIENT)
Dept: CARDIOLOGY | Facility: CLINIC | Age: 67
End: 2022-06-29
Attending: EMERGENCY MEDICINE
Payer: COMMERCIAL

## 2022-06-29 VITALS
HEART RATE: 60 BPM | RESPIRATION RATE: 16 BRPM | DIASTOLIC BLOOD PRESSURE: 80 MMHG | BODY MASS INDEX: 34.54 KG/M2 | SYSTOLIC BLOOD PRESSURE: 180 MMHG | WEIGHT: 195 LBS

## 2022-06-29 DIAGNOSIS — I49.1 PAC (PREMATURE ATRIAL CONTRACTION): ICD-10-CM

## 2022-06-29 DIAGNOSIS — T73.3XXA FATIGUE DUE TO EXCESSIVE EXERTION, INITIAL ENCOUNTER: Primary | ICD-10-CM

## 2022-06-29 DIAGNOSIS — G47.00 INSOMNIA, UNSPECIFIED TYPE: ICD-10-CM

## 2022-06-29 DIAGNOSIS — I49.8 SINUS ARRHYTHMIA: ICD-10-CM

## 2022-06-29 PROCEDURE — 99203 OFFICE O/P NEW LOW 30 MIN: CPT | Performed by: INTERNAL MEDICINE

## 2022-06-29 RX ORDER — PEN NEEDLE, DIABETIC 32GX 5/32"
NEEDLE, DISPOSABLE MISCELLANEOUS
COMMUNITY
Start: 2022-06-10

## 2022-06-29 RX ORDER — POLYETHYLENE GLYCOL 400 AND PROPYLENE GLYCOL 4; 3 MG/ML; MG/ML
SOLUTION/ DROPS OPHTHALMIC
COMMUNITY
Start: 2021-11-08 | End: 2022-11-28

## 2022-06-29 RX ORDER — INSULIN DEGLUDEC 100 U/ML
54 INJECTION, SOLUTION SUBCUTANEOUS DAILY
COMMUNITY
Start: 2022-05-24

## 2022-06-29 RX ORDER — MINERAL OIL, PETROLATUM 425; 568 MG/G; MG/G
OINTMENT OPHTHALMIC
COMMUNITY
Start: 2021-11-08 | End: 2022-11-28

## 2022-06-29 RX ORDER — INSULIN ASPART 100 [IU]/ML
INJECTION, SOLUTION INTRAVENOUS; SUBCUTANEOUS
Status: ON HOLD | COMMUNITY
Start: 2022-06-16 | End: 2022-12-04

## 2022-06-29 RX ORDER — LANCETS 33 GAUGE
EACH MISCELLANEOUS
COMMUNITY
Start: 2021-09-04

## 2022-06-29 NOTE — LETTER
6/29/2022    ALEAH Justice  [facility] [address]  [facility] [address]    RE: Mariama Keene       Dear Colleague,     I had the pleasure of seeing Mariama Keene in the [facility].    Thank you, Dr. Hargrove, for asking the [facility] team to see Ms. Mariama Keene to evaluate       Assessment/Recommendations   Assessment/Plan:  1. PAC now resolved but at risk for pulm HTN givne possible JAMAR - have echo and sleep study  2. CV prevention with excellent LDL, cont rosuvastatin and diet/exercise    Follow up prn     History of Present Illness/Subjective    Ms. Mariama Keene is a 66 year old female with hx of CKD (Cr 1.87), TSH 1.88), LDL 58 (3/2022), IDDM, HTN , covid 2020 with palpitatation sfound PAC , that have resolved in thelast week.  No chest pain/pressure, no dyspnea on exeriton , can walk upa flight of stairs without stopping, no syncopal events but now feels nervous and admits to panic disorder,  Never tested for JAMAR.  She does not sleep well. Noted trop 0.02. Hbg A1c 8 recently on DM care.            Physical Examination Review of Systems   BP (!) 180/80 (BP Location: Left arm, Patient Position: Sitting, Cuff Size: Adult Large)   Pulse 60   Resp 16   Wt 88.5 kg (195 lb)   BMI 34.54 kg/m    Body mass index is 34.54 kg/m .  Wt Readings from Last 3 Encounters:   06/29/22 88.5 kg (195 lb)   06/17/22 83.5 kg (184 lb)   06/25/20 86.6 kg (191 lb)     [unfilled]  General Appearance:   no distress, normal body habitus   ENT/Mouth: membranes moist, no oral lesions or bleeding gums.      EYES:  no scleral icterus, normal conjunctivae   Neck: no carotid bruits or thyromegaly   Chest/Lungs:   lungs are clear to auscultation, no rales or wheezing,  sternal scar, equal chest wall expansion    Cardiovascular:   Regular. Normal first and second heart sounds with no murmurs, rubs, or gallops; the carotid, radial and posterior tibial pulses are intact, Jugular venous  pressure , edema bilaterally    Abdomen:  no organomegaly, masses, bruits, or tenderness; bowel sounds are present   Extremities: no cyanosis or clubbing   Skin: no xanthelasma, warm.    Neurologic: normal  bilateral, no tremors     Psychiatric: alert and oriented x3, calm     Review of Systems - 12 points nega other than above      Medical History  Surgical History Family History Social History   Past Medical History:   Diagnosis Date     Asthma      Asthma      Asthma      Diabetes (H)      Diabetes (H)      Diabetes (H)      Hypertension      Hypertension      Hypertension      Panic attacks      Panic attacks      Panic attacks     Past Surgical History:   Procedure Laterality Date     ABSCESS DRAINAGE      On back     HYSTERECTOMY      age 33 - partial     NEPHRECTOMY PARTIAL Right      OOPHORECTOMY      age 48    Family History   Problem Relation Age of Onset     Breast Cancer Maternal Aunt 45.00     Alzheimer Disease Mother      Hypertension Mother      Chronic Obstructive Pulmonary Disease Father     Social History     Socioeconomic History     Marital status:      Spouse name: Not on file     Number of children: Not on file     Years of education: Not on file     Highest education level: Not on file   Occupational History     Not on file   Tobacco Use     Smoking status: Former Smoker     Quit date: 1992     Years since quittin.5     Smokeless tobacco: Never Used   Substance and Sexual Activity     Alcohol use: Yes     Drug use: Never     Sexual activity: Not Currently     Birth control/protection: Surgical   Other Topics Concern     Not on file   Social History Narrative     Not on file     Social Determinants of Health     Financial Resource Strain: Not on file   Food Insecurity: Not on file   Transportation Needs: Not on file   Physical Activity: Not on file   Stress: Not on file   Social Connections: Not on file   Intimate Partner Violence: Not on file   Housing Stability: Not on  file          Medications  Allergies   Scheduled Meds:  Continuous Infusions:  PRN Meds:. Allergies   Allergen Reactions     Alprazolam Other (See Comments)     Cephalexin Rash and Swelling     Exenatide Other (See Comments)     Iodinated Contrast Media [Diagnostic X-Ray Materials] Unknown     Lisinopril Other (See Comments)     Nitrofurantoin Monohyd/M-Cryst [Nitrofurantoin] Rash     Sulfa (Sulfonamide Antibiotics) [Sulfa Drugs] Other (See Comments)         Lab Results    Chemistry/lipid CBC Cardiac Enzymes/BNP/TSH/INR   Lab Results   Component Value Date    CHOL 151 03/16/2022    HDL 42 (L) 03/16/2022    TRIG 255 (H) 03/16/2022    BUN 39 (H) 06/17/2022     06/17/2022    CO2 23 06/17/2022    Lab Results   Component Value Date    WBC 7.2 06/17/2022    HGB 14.2 06/17/2022    HCT 41.3 06/17/2022    MCV 91 06/17/2022     06/17/2022    Lab Results   Component Value Date    TROPONINI 0.02 06/17/2022    TSH 5.88 (H) 03/16/2022    INR 1.03 08/20/2020              Ryan Sotelo MD  Interventional Cardiology  [facility]    Thank you for allowing me to participate in the care of your patient.      Sincerely,     Ryan Sotelo MD     [facility]  cc:   Len Hargrove MD  [address] [place] [id number]

## 2022-06-29 NOTE — PROGRESS NOTES
Thank you, Dr. Hargrove, for asking the [facility] Care team to see Ms. Mariama Keene to evaluate       Assessment/Recommendations   Assessment/Plan:  1. PAC now resolved but at risk for pulm HTN givne possible JAMAR - have echo and sleep study  2. CV prevention with excellent LDL, cont rosuvastatin and diet/exercise    Follow up prn     History of Present Illness/Subjective    Ms. Mariama Keene is a 66 year old female with hx of CKD (Cr 1.87), TSH 1.88), LDL 58 (3/2022), IDDM, HTN , covid 2020 with palpitatation sfound PAC , that have resolved in thelast week.  No chest pain/pressure, no dyspnea on exeriton , can walk upa flight of stairs without stopping, no syncopal events but now feels nervous and admits to panic disorder,  Never tested for JAMAR.  She does not sleep well. Noted trop 0.02. Hbg A1c 8 recently on DM care.            Physical Examination Review of Systems   BP (!) 180/80 (BP Location: Left arm, Patient Position: Sitting, Cuff Size: Adult Large)   Pulse 60   Resp 16   Wt 88.5 kg (195 lb)   BMI 34.54 kg/m    Body mass index is 34.54 kg/m .  Wt Readings from Last 3 Encounters:   06/29/22 88.5 kg (195 lb)   06/17/22 83.5 kg (184 lb)   06/25/20 86.6 kg (191 lb)     [unfilled]  General Appearance:   no distress, normal body habitus   ENT/Mouth: membranes moist, no oral lesions or bleeding gums.      EYES:  no scleral icterus, normal conjunctivae   Neck: no carotid bruits or thyromegaly   Chest/Lungs:   lungs are clear to auscultation, no rales or wheezing,  sternal scar, equal chest wall expansion    Cardiovascular:   Regular. Normal first and second heart sounds with no murmurs, rubs, or gallops; the carotid, radial and posterior tibial pulses are intact, Jugular venous pressure , edema bilaterally    Abdomen:  no organomegaly, masses, bruits, or tenderness; bowel sounds are present   Extremities: no cyanosis or clubbing   Skin: no xanthelasma, warm.    Neurologic: normal  bilateral, no  tremors     Psychiatric: alert and oriented x3, calm     Review of Systems - 12 points nega other than above      Medical History  Surgical History Family History Social History   Past Medical History:   Diagnosis Date     Asthma      Asthma      Asthma      Diabetes (H)      Diabetes (H)      Diabetes (H)      Hypertension      Hypertension      Hypertension      Panic attacks      Panic attacks      Panic attacks     Past Surgical History:   Procedure Laterality Date     ABSCESS DRAINAGE      On back     HYSTERECTOMY      age 33 - partial     NEPHRECTOMY PARTIAL Right      OOPHORECTOMY      age 48    Family History   Problem Relation Age of Onset     Breast Cancer Maternal Aunt 45.00     Alzheimer Disease Mother      Hypertension Mother      Chronic Obstructive Pulmonary Disease Father     Social History     Socioeconomic History     Marital status:      Spouse name: Not on file     Number of children: Not on file     Years of education: Not on file     Highest education level: Not on file   Occupational History     Not on file   Tobacco Use     Smoking status: Former Smoker     Quit date: 1992     Years since quittin.5     Smokeless tobacco: Never Used   Substance and Sexual Activity     Alcohol use: Yes     Drug use: Never     Sexual activity: Not Currently     Birth control/protection: Surgical   Other Topics Concern     Not on file   Social History Narrative     Not on file     Social Determinants of Health     Financial Resource Strain: Not on file   Food Insecurity: Not on file   Transportation Needs: Not on file   Physical Activity: Not on file   Stress: Not on file   Social Connections: Not on file   Intimate Partner Violence: Not on file   Housing Stability: Not on file          Medications  Allergies   Scheduled Meds:  Continuous Infusions:  PRN Meds:. Allergies   Allergen Reactions     Alprazolam Other (See Comments)     Cephalexin Rash and Swelling     Exenatide Other (See Comments)      Iodinated Contrast Media [Diagnostic X-Ray Materials] Unknown     Lisinopril Other (See Comments)     Nitrofurantoin Monohyd/M-Cryst [Nitrofurantoin] Rash     Sulfa (Sulfonamide Antibiotics) [Sulfa Drugs] Other (See Comments)         Lab Results    Chemistry/lipid CBC Cardiac Enzymes/BNP/TSH/INR   Lab Results   Component Value Date    CHOL 151 03/16/2022    HDL 42 (L) 03/16/2022    TRIG 255 (H) 03/16/2022    BUN 39 (H) 06/17/2022     06/17/2022    CO2 23 06/17/2022    Lab Results   Component Value Date    WBC 7.2 06/17/2022    HGB 14.2 06/17/2022    HCT 41.3 06/17/2022    MCV 91 06/17/2022     06/17/2022    Lab Results   Component Value Date    TROPONINI 0.02 06/17/2022    TSH 5.88 (H) 03/16/2022    INR 1.03 08/20/2020              Ryan Sotelo MD  Interventional Cardiology  [facility]

## 2022-07-17 ENCOUNTER — HEALTH MAINTENANCE LETTER (OUTPATIENT)
Age: 67
End: 2022-07-17

## 2022-09-25 ENCOUNTER — HEALTH MAINTENANCE LETTER (OUTPATIENT)
Age: 67
End: 2022-09-25

## 2022-10-12 ENCOUNTER — DOCUMENTATION ONLY (OUTPATIENT)
Dept: PHARMACY | Facility: CLINIC | Age: 67
End: 2022-10-12

## 2022-10-12 NOTE — PROGRESS NOTES
Notified clinic that patient has not been filling Freestyle Ursula recently and has not been returning our calls. Emphasized importance of using this given she is on multiple daily injections and is at risk for hypoglycemia. Recommended routing message to diabetes .     Tony Dyre, PharmD  Endocrinology & Diabetes [facility] Pharmacist  [address] [facility] [address]  Direct Voicemail: [phone]

## 2022-11-28 ENCOUNTER — APPOINTMENT (OUTPATIENT)
Dept: CARDIOLOGY | Facility: HOSPITAL | Age: 67
DRG: 202 | End: 2022-11-28
Attending: INTERNAL MEDICINE
Payer: COMMERCIAL

## 2022-11-28 ENCOUNTER — HOSPITAL ENCOUNTER (INPATIENT)
Facility: HOSPITAL | Age: 67
LOS: 6 days | Discharge: HOME | DRG: 202 | End: 2022-12-04
Attending: EMERGENCY MEDICINE | Admitting: INTERNAL MEDICINE
Payer: COMMERCIAL

## 2022-11-28 ENCOUNTER — APPOINTMENT (OUTPATIENT)
Dept: OCCUPATIONAL THERAPY | Facility: HOSPITAL | Age: 67
DRG: 202 | End: 2022-11-28
Attending: INTERNAL MEDICINE
Payer: COMMERCIAL

## 2022-11-28 ENCOUNTER — APPOINTMENT (OUTPATIENT)
Dept: RADIOLOGY | Facility: HOSPITAL | Age: 67
DRG: 202 | End: 2022-11-28
Attending: EMERGENCY MEDICINE
Payer: COMMERCIAL

## 2022-11-28 DIAGNOSIS — I10 HYPERTENSION, UNSPECIFIED TYPE: ICD-10-CM

## 2022-11-28 DIAGNOSIS — R79.89 ELEVATED BRAIN NATRIURETIC PEPTIDE (BNP) LEVEL: ICD-10-CM

## 2022-11-28 DIAGNOSIS — R06.02 SOB (SHORTNESS OF BREATH): ICD-10-CM

## 2022-11-28 DIAGNOSIS — E03.9 ACQUIRED HYPOTHYROIDISM: ICD-10-CM

## 2022-11-28 DIAGNOSIS — B33.8 RSV INFECTION: ICD-10-CM

## 2022-11-28 DIAGNOSIS — R79.89 ELEVATED TROPONIN: ICD-10-CM

## 2022-11-28 DIAGNOSIS — I10 ESSENTIAL HYPERTENSION: ICD-10-CM

## 2022-11-28 DIAGNOSIS — J45.21 MILD INTERMITTENT ASTHMA WITH ACUTE EXACERBATION: Primary | ICD-10-CM

## 2022-11-28 LAB
ALBUMIN SERPL BCG-MCNC: 3.6 G/DL (ref 3.5–5.2)
ALP SERPL-CCNC: 118 U/L (ref 35–104)
ALT SERPL W P-5'-P-CCNC: 22 U/L (ref 10–35)
ANION GAP SERPL CALCULATED.3IONS-SCNC: 10 MMOL/L (ref 7–15)
APTT PPP: 24 SECONDS (ref 22–38)
AST SERPL W P-5'-P-CCNC: 31 U/L (ref 10–35)
BASOPHILS # BLD AUTO: 0 10E3/UL (ref 0–0.2)
BASOPHILS NFR BLD AUTO: 0 %
BILIRUB SERPL-MCNC: 0.6 MG/DL
BUN SERPL-MCNC: 38.3 MG/DL (ref 8–23)
CALCIUM SERPL-MCNC: 8.8 MG/DL (ref 8.8–10.2)
CHLORIDE SERPL-SCNC: 99 MMOL/L (ref 98–107)
CREAT SERPL-MCNC: 2.15 MG/DL (ref 0.51–0.95)
D DIMER PPP FEU-MCNC: 0.93 UG/ML FEU (ref 0–0.5)
DEPRECATED HCO3 PLAS-SCNC: 27 MMOL/L (ref 22–29)
EOSINOPHIL # BLD AUTO: 0 10E3/UL (ref 0–0.7)
EOSINOPHIL NFR BLD AUTO: 1 %
ERYTHROCYTE [DISTWIDTH] IN BLOOD BY AUTOMATED COUNT: 12.7 % (ref 10–15)
FLUAV RNA SPEC QL NAA+PROBE: NEGATIVE
FLUBV RNA RESP QL NAA+PROBE: NEGATIVE
GFR SERPL CREATININE-BSD FRML MDRD: 24 ML/MIN/1.73M2
GLUCOSE BLDC GLUCOMTR-MCNC: 319 MG/DL (ref 70–99)
GLUCOSE BLDC GLUCOMTR-MCNC: 398 MG/DL (ref 70–99)
GLUCOSE BLDC GLUCOMTR-MCNC: 453 MG/DL (ref 70–99)
GLUCOSE SERPL-MCNC: 243 MG/DL (ref 70–99)
HBA1C MFR BLD: 10.2 %
HCT VFR BLD AUTO: 39.1 % (ref 35–47)
HGB BLD-MCNC: 13.2 G/DL (ref 11.7–15.7)
HOLD SPECIMEN: NORMAL
HOLD SPECIMEN: NORMAL
IMM GRANULOCYTES # BLD: 0 10E3/UL
IMM GRANULOCYTES NFR BLD: 0 %
INR PPP: 1 (ref 0.85–1.15)
INR PPP: NORMAL
LVEF ECHO: NORMAL
LYMPHOCYTES # BLD AUTO: 0.8 10E3/UL (ref 0.8–5.3)
LYMPHOCYTES NFR BLD AUTO: 13 %
MAGNESIUM SERPL-MCNC: 1.9 MG/DL (ref 1.7–2.3)
MCH RBC QN AUTO: 30.8 PG (ref 26.5–33)
MCHC RBC AUTO-ENTMCNC: 33.8 G/DL (ref 31.5–36.5)
MCV RBC AUTO: 91 FL (ref 78–100)
MONOCYTES # BLD AUTO: 0.5 10E3/UL (ref 0–1.3)
MONOCYTES NFR BLD AUTO: 8 %
NEUTROPHILS # BLD AUTO: 4.7 10E3/UL (ref 1.6–8.3)
NEUTROPHILS NFR BLD AUTO: 78 %
NRBC # BLD AUTO: 0 10E3/UL
NRBC BLD AUTO-RTO: 0 /100
NT-PROBNP SERPL-MCNC: 2370 PG/ML (ref 0–900)
PLATELET # BLD AUTO: 146 10E3/UL (ref 150–450)
POTASSIUM SERPL-SCNC: 3.9 MMOL/L (ref 3.4–5.3)
PROCALCITONIN SERPL IA-MCNC: 0.21 NG/ML
PROT SERPL-MCNC: 6.3 G/DL (ref 6.4–8.3)
RBC # BLD AUTO: 4.29 10E6/UL (ref 3.8–5.2)
RSV RNA SPEC NAA+PROBE: POSITIVE
SARS-COV-2 RNA RESP QL NAA+PROBE: NEGATIVE
SODIUM SERPL-SCNC: 136 MMOL/L (ref 136–145)
T4 FREE SERPL-MCNC: 0.77 NG/DL (ref 0.9–1.7)
TROPONIN T SERPL HS-MCNC: 58 NG/L
TROPONIN T SERPL HS-MCNC: 69 NG/L
TSH SERPL DL<=0.005 MIU/L-ACNC: 7.05 UIU/ML (ref 0.3–4.2)
WBC # BLD AUTO: 6 10E3/UL (ref 4–11)

## 2022-11-28 PROCEDURE — 84439 ASSAY OF FREE THYROXINE: CPT | Performed by: INTERNAL MEDICINE

## 2022-11-28 PROCEDURE — 250N000009 HC RX 250: Performed by: INTERNAL MEDICINE

## 2022-11-28 PROCEDURE — 94640 AIRWAY INHALATION TREATMENT: CPT | Mod: 76

## 2022-11-28 PROCEDURE — 85610 PROTHROMBIN TIME: CPT | Performed by: EMERGENCY MEDICINE

## 2022-11-28 PROCEDURE — 87637 SARSCOV2&INF A&B&RSV AMP PRB: CPT | Performed by: EMERGENCY MEDICINE

## 2022-11-28 PROCEDURE — 93005 ELECTROCARDIOGRAM TRACING: CPT | Performed by: EMERGENCY MEDICINE

## 2022-11-28 PROCEDURE — 93306 TTE W/DOPPLER COMPLETE: CPT | Mod: 26 | Performed by: INTERNAL MEDICINE

## 2022-11-28 PROCEDURE — 94640 AIRWAY INHALATION TREATMENT: CPT

## 2022-11-28 PROCEDURE — 84484 ASSAY OF TROPONIN QUANT: CPT | Performed by: EMERGENCY MEDICINE

## 2022-11-28 PROCEDURE — 84443 ASSAY THYROID STIM HORMONE: CPT | Performed by: INTERNAL MEDICINE

## 2022-11-28 PROCEDURE — 97535 SELF CARE MNGMENT TRAINING: CPT | Mod: GO

## 2022-11-28 PROCEDURE — 36415 COLL VENOUS BLD VENIPUNCTURE: CPT | Performed by: EMERGENCY MEDICINE

## 2022-11-28 PROCEDURE — 99223 1ST HOSP IP/OBS HIGH 75: CPT | Performed by: INTERNAL MEDICINE

## 2022-11-28 PROCEDURE — 250N000013 HC RX MED GY IP 250 OP 250 PS 637: Performed by: INTERNAL MEDICINE

## 2022-11-28 PROCEDURE — C9803 HOPD COVID-19 SPEC COLLECT: HCPCS

## 2022-11-28 PROCEDURE — 999N000157 HC STATISTIC RCP TIME EA 10 MIN

## 2022-11-28 PROCEDURE — 250N000009 HC RX 250: Performed by: EMERGENCY MEDICINE

## 2022-11-28 PROCEDURE — 97165 OT EVAL LOW COMPLEX 30 MIN: CPT | Mod: GO

## 2022-11-28 PROCEDURE — 85025 COMPLETE CBC W/AUTO DIFF WBC: CPT | Performed by: EMERGENCY MEDICINE

## 2022-11-28 PROCEDURE — 83735 ASSAY OF MAGNESIUM: CPT | Performed by: EMERGENCY MEDICINE

## 2022-11-28 PROCEDURE — 85730 THROMBOPLASTIN TIME PARTIAL: CPT | Performed by: EMERGENCY MEDICINE

## 2022-11-28 PROCEDURE — 93306 TTE W/DOPPLER COMPLETE: CPT

## 2022-11-28 PROCEDURE — 85379 FIBRIN DEGRADATION QUANT: CPT | Performed by: EMERGENCY MEDICINE

## 2022-11-28 PROCEDURE — 82040 ASSAY OF SERUM ALBUMIN: CPT | Performed by: EMERGENCY MEDICINE

## 2022-11-28 PROCEDURE — 83880 ASSAY OF NATRIURETIC PEPTIDE: CPT | Performed by: EMERGENCY MEDICINE

## 2022-11-28 PROCEDURE — 120N000001 HC R&B MED SURG/OB

## 2022-11-28 PROCEDURE — 80053 COMPREHEN METABOLIC PANEL: CPT | Performed by: EMERGENCY MEDICINE

## 2022-11-28 PROCEDURE — 250N000012 HC RX MED GY IP 250 OP 636 PS 637: Performed by: INTERNAL MEDICINE

## 2022-11-28 PROCEDURE — 250N000011 HC RX IP 250 OP 636: Performed by: INTERNAL MEDICINE

## 2022-11-28 PROCEDURE — 84145 PROCALCITONIN (PCT): CPT | Performed by: EMERGENCY MEDICINE

## 2022-11-28 PROCEDURE — G0463 HOSPITAL OUTPT CLINIC VISIT: HCPCS

## 2022-11-28 PROCEDURE — 99285 EMERGENCY DEPT VISIT HI MDM: CPT | Mod: 25

## 2022-11-28 PROCEDURE — 83036 HEMOGLOBIN GLYCOSYLATED A1C: CPT | Performed by: INTERNAL MEDICINE

## 2022-11-28 PROCEDURE — 71045 X-RAY EXAM CHEST 1 VIEW: CPT

## 2022-11-28 RX ORDER — ACETAMINOPHEN 325 MG/1
650 TABLET ORAL EVERY 6 HOURS PRN
Status: DISCONTINUED | OUTPATIENT
Start: 2022-11-28 | End: 2022-12-04 | Stop reason: HOSPADM

## 2022-11-28 RX ORDER — HYDROCHLOROTHIAZIDE 12.5 MG/1
12.5 CAPSULE ORAL DAILY
Status: DISCONTINUED | OUTPATIENT
Start: 2022-11-28 | End: 2022-12-04

## 2022-11-28 RX ORDER — LIDOCAINE 40 MG/G
CREAM TOPICAL
Status: DISCONTINUED | OUTPATIENT
Start: 2022-11-28 | End: 2022-12-04 | Stop reason: HOSPADM

## 2022-11-28 RX ORDER — IPRATROPIUM BROMIDE AND ALBUTEROL SULFATE 2.5; .5 MG/3ML; MG/3ML
3 SOLUTION RESPIRATORY (INHALATION) ONCE
Status: COMPLETED | OUTPATIENT
Start: 2022-11-28 | End: 2022-11-28

## 2022-11-28 RX ORDER — DEXTROSE MONOHYDRATE 25 G/50ML
25-50 INJECTION, SOLUTION INTRAVENOUS
Status: DISCONTINUED | OUTPATIENT
Start: 2022-11-28 | End: 2022-12-04 | Stop reason: HOSPADM

## 2022-11-28 RX ORDER — ALBUTEROL SULFATE 90 UG/1
2 AEROSOL, METERED RESPIRATORY (INHALATION) EVERY 4 HOURS PRN
Status: DISCONTINUED | OUTPATIENT
Start: 2022-11-28 | End: 2022-12-04 | Stop reason: HOSPADM

## 2022-11-28 RX ORDER — LEVOTHYROXINE SODIUM 25 UG/1
75 TABLET ORAL DAILY
Status: DISCONTINUED | OUTPATIENT
Start: 2022-11-28 | End: 2022-12-02

## 2022-11-28 RX ORDER — AMOXICILLIN 250 MG
2 CAPSULE ORAL 2 TIMES DAILY
Status: DISCONTINUED | OUTPATIENT
Start: 2022-11-28 | End: 2022-12-04 | Stop reason: HOSPADM

## 2022-11-28 RX ORDER — METHYLPREDNISOLONE SODIUM SUCCINATE 125 MG/2ML
60 INJECTION, POWDER, LYOPHILIZED, FOR SOLUTION INTRAMUSCULAR; INTRAVENOUS EVERY 8 HOURS
Status: DISCONTINUED | OUTPATIENT
Start: 2022-11-28 | End: 2022-11-29

## 2022-11-28 RX ORDER — ALBUTEROL SULFATE 0.83 MG/ML
2.5 SOLUTION RESPIRATORY (INHALATION)
Status: DISCONTINUED | OUTPATIENT
Start: 2022-11-28 | End: 2022-11-29

## 2022-11-28 RX ORDER — NICOTINE POLACRILEX 4 MG
15-30 LOZENGE BUCCAL
Status: DISCONTINUED | OUTPATIENT
Start: 2022-11-28 | End: 2022-12-04 | Stop reason: HOSPADM

## 2022-11-28 RX ORDER — METOPROLOL TARTRATE 100 MG
100 TABLET ORAL 2 TIMES DAILY
Status: DISCONTINUED | OUTPATIENT
Start: 2022-11-28 | End: 2022-12-04 | Stop reason: HOSPADM

## 2022-11-28 RX ORDER — POLYETHYLENE GLYCOL 3350 17 G/17G
17 POWDER, FOR SOLUTION ORAL DAILY PRN
Status: DISCONTINUED | OUTPATIENT
Start: 2022-11-28 | End: 2022-12-04 | Stop reason: HOSPADM

## 2022-11-28 RX ORDER — ONDANSETRON 2 MG/ML
4 INJECTION INTRAMUSCULAR; INTRAVENOUS EVERY 6 HOURS PRN
Status: DISCONTINUED | OUTPATIENT
Start: 2022-11-28 | End: 2022-12-04 | Stop reason: HOSPADM

## 2022-11-28 RX ORDER — FUROSEMIDE 10 MG/ML
40 INJECTION INTRAMUSCULAR; INTRAVENOUS ONCE
Status: COMPLETED | OUTPATIENT
Start: 2022-11-28 | End: 2022-11-28

## 2022-11-28 RX ORDER — HEPARIN SODIUM 5000 [USP'U]/.5ML
5000 INJECTION, SOLUTION INTRAVENOUS; SUBCUTANEOUS EVERY 12 HOURS
Status: DISCONTINUED | OUTPATIENT
Start: 2022-11-28 | End: 2022-12-04 | Stop reason: HOSPADM

## 2022-11-28 RX ORDER — ONDANSETRON 4 MG/1
4 TABLET, ORALLY DISINTEGRATING ORAL EVERY 6 HOURS PRN
Status: DISCONTINUED | OUTPATIENT
Start: 2022-11-28 | End: 2022-12-04 | Stop reason: HOSPADM

## 2022-11-28 RX ORDER — AMOXICILLIN 250 MG
1 CAPSULE ORAL 2 TIMES DAILY
Status: DISCONTINUED | OUTPATIENT
Start: 2022-11-28 | End: 2022-12-04 | Stop reason: HOSPADM

## 2022-11-28 RX ORDER — AZITHROMYCIN 250 MG/1
250 TABLET, FILM COATED ORAL DAILY
Status: ON HOLD | COMMUNITY
End: 2022-12-04

## 2022-11-28 RX ORDER — IRBESARTAN 300 MG/1
300 TABLET ORAL DAILY
Status: DISCONTINUED | OUTPATIENT
Start: 2022-11-29 | End: 2022-11-29

## 2022-11-28 RX ORDER — ACETAMINOPHEN 650 MG/1
650 SUPPOSITORY RECTAL EVERY 6 HOURS PRN
Status: DISCONTINUED | OUTPATIENT
Start: 2022-11-28 | End: 2022-12-04 | Stop reason: HOSPADM

## 2022-11-28 RX ORDER — HYDRALAZINE HYDROCHLORIDE 20 MG/ML
10 INJECTION INTRAMUSCULAR; INTRAVENOUS EVERY 6 HOURS PRN
Status: DISCONTINUED | OUTPATIENT
Start: 2022-11-28 | End: 2022-11-30

## 2022-11-28 RX ORDER — DEXTROSE MONOHYDRATE 25 G/50ML
25-50 INJECTION, SOLUTION INTRAVENOUS
Status: DISCONTINUED | OUTPATIENT
Start: 2022-11-28 | End: 2022-12-02

## 2022-11-28 RX ORDER — ALBUTEROL SULFATE 0.83 MG/ML
2.5 SOLUTION RESPIRATORY (INHALATION)
Status: DISCONTINUED | OUTPATIENT
Start: 2022-11-28 | End: 2022-12-04 | Stop reason: HOSPADM

## 2022-11-28 RX ORDER — NICOTINE POLACRILEX 4 MG
15-30 LOZENGE BUCCAL
Status: DISCONTINUED | OUTPATIENT
Start: 2022-11-28 | End: 2022-12-02

## 2022-11-28 RX ORDER — ROSUVASTATIN CALCIUM 5 MG/1
5 TABLET, COATED ORAL AT BEDTIME
Status: DISCONTINUED | OUTPATIENT
Start: 2022-11-28 | End: 2022-12-04 | Stop reason: HOSPADM

## 2022-11-28 RX ORDER — PREDNISONE 20 MG/1
40 TABLET ORAL DAILY
Status: ON HOLD | COMMUNITY
End: 2022-12-04

## 2022-11-28 RX ADMIN — METHYLPREDNISOLONE SODIUM SUCCINATE 62.5 MG: 125 INJECTION, POWDER, FOR SOLUTION INTRAMUSCULAR; INTRAVENOUS at 19:48

## 2022-11-28 RX ADMIN — INSULIN ASPART 4 UNITS: 100 INJECTION, SOLUTION INTRAVENOUS; SUBCUTANEOUS at 17:33

## 2022-11-28 RX ADMIN — SENNOSIDES AND DOCUSATE SODIUM 1 TABLET: 50; 8.6 TABLET ORAL at 19:49

## 2022-11-28 RX ADMIN — ROSUVASTATIN CALCIUM 5 MG: 5 TABLET, FILM COATED ORAL at 23:01

## 2022-11-28 RX ADMIN — HEPARIN SODIUM 5000 UNITS: 5000 INJECTION, SOLUTION INTRAVENOUS; SUBCUTANEOUS at 23:01

## 2022-11-28 RX ADMIN — ALBUTEROL SULFATE 2.5 MG: 2.5 SOLUTION RESPIRATORY (INHALATION) at 14:10

## 2022-11-28 RX ADMIN — HEPARIN SODIUM 5000 UNITS: 5000 INJECTION, SOLUTION INTRAVENOUS; SUBCUTANEOUS at 12:03

## 2022-11-28 RX ADMIN — LEVOTHYROXINE SODIUM 75 MCG: 0.03 TABLET ORAL at 14:36

## 2022-11-28 RX ADMIN — SENNOSIDES AND DOCUSATE SODIUM 1 TABLET: 50; 8.6 TABLET ORAL at 12:01

## 2022-11-28 RX ADMIN — IPRATROPIUM BROMIDE AND ALBUTEROL SULFATE 3 ML: .5; 2.5 SOLUTION RESPIRATORY (INHALATION) at 07:29

## 2022-11-28 RX ADMIN — FUROSEMIDE 40 MG: 10 INJECTION, SOLUTION INTRAMUSCULAR; INTRAVENOUS at 14:36

## 2022-11-28 RX ADMIN — METOPROLOL TARTRATE 100 MG: 100 TABLET, FILM COATED ORAL at 19:49

## 2022-11-28 RX ADMIN — ALBUTEROL SULFATE 2.5 MG: 2.5 SOLUTION RESPIRATORY (INHALATION) at 19:13

## 2022-11-28 RX ADMIN — INSULIN GLARGINE 30 UNITS: 100 INJECTION, SOLUTION SUBCUTANEOUS at 22:59

## 2022-11-28 RX ADMIN — METHYLPREDNISOLONE SODIUM SUCCINATE 62.5 MG: 125 INJECTION, POWDER, FOR SOLUTION INTRAMUSCULAR; INTRAVENOUS at 12:02

## 2022-11-28 ASSESSMENT — ENCOUNTER SYMPTOMS
FEVER: 0
CHEST TIGHTNESS: 1
ABDOMINAL PAIN: 0
DYSURIA: 0
CHILLS: 1
SHORTNESS OF BREATH: 1
COUGH: 1
VOMITING: 0

## 2022-11-28 ASSESSMENT — ACTIVITIES OF DAILY LIVING (ADL)
ADLS_ACUITY_SCORE: 35
ADLS_ACUITY_SCORE: 35
PREVIOUS_RESPONSIBILITIES: MEAL PREP;HOUSEKEEPING;LAUNDRY;MEDICATION MANAGEMENT;FINANCES;DRIVING
ADLS_ACUITY_SCORE: 35

## 2022-11-28 NOTE — ED TRIAGE NOTES
Pt presents from home for evaluation of SOB with cough. She was seen in UC on 11/25 and diagnosed with Asthma exacerbation (negative for covid and flu), given a Z-pack and steroids. Pt also endorses chest tightness, generalized weakness, congestion, and nausea without vomiting.

## 2022-11-28 NOTE — PHARMACY-ADMISSION MEDICATION HISTORY
Pharmacy Note - Admission Medication History    Pertinent Provider Information: Recent start on prednisone and Z-pack on 11/26 x5 days.     ______________________________________________________________________    Prior To Admission (PTA) med list completed and updated in EMR.       PTA Med List   Medication Sig Note Last Dose     albuterol (PROAIR HFA;PROVENTIL HFA;VENTOLIN HFA) 90 mcg/actuation inhaler [ALBUTEROL (PROAIR HFA;PROVENTIL HFA;VENTOLIN HFA) 90 MCG/ACTUATION INHALER] Inhale 2 puffs every 4 (four) hours as needed for wheezing (bronchospasm).  11/27/2022     azithromycin (ZITHROMAX) 250 MG tablet Take 250 mg by mouth daily Recently started Z-pack on 11/26 x5days  11/27/2022     hydroCHLOROthiazide (MICROZIDE) 12.5 mg capsule [HYDROCHLOROTHIAZIDE (MICROZIDE) 12.5 MG CAPSULE] Take 1 capsule (12.5 mg total) by mouth daily.  11/28/2022     irbesartan (AVAPRO) 300 MG tablet [IRBESARTAN (AVAPRO) 300 MG TABLET] Take 1 tablet (300 mg total) by mouth daily.  11/28/2022     levothyroxine (SYNTHROID) 75 MCG tablet [LEVOTHYROXINE (SYNTHROID) 75 MCG TABLET] Take 1 tablet (75 mcg total) by mouth Daily at 6:00 am. 11/28/2022: Pt states that she has not been taking this More than a month     metoprolol tartrate (LOPRESSOR) 50 MG tablet [METOPROLOL TARTRATE (LOPRESSOR) 50 MG TABLET] Take 2 tablets (100 mg total) by mouth 2 (two) times a day.  11/28/2022     NOVOLOG FLEXPEN 100 UNIT/ML soln 3 times daily (with meals)  11/28/2022     omeprazole (PRILOSEC) 20 MG capsule [OMEPRAZOLE (PRILOSEC) 20 MG CAPSULE] Take 1 capsule (20 mg total) by mouth daily before breakfast.  11/28/2022     predniSONE (DELTASONE) 20 MG tablet Take 40 mg by mouth daily Started on 11/26 x5 days  11/27/2022     rosuvastatin (CRESTOR) 5 MG tablet [ROSUVASTATIN (CRESTOR) 5 MG TABLET] Take 1 tablet (5 mg total) by mouth daily.  11/27/2022 at hs     TRESIBA FLEXTOUCH 100 UNIT/ML pen ADMINISTER 48 UNITS UNDER THE SKIN EVERY DAY  11/27/2022 at hs        Information source(s): Patient  Method of interview communication: in-person    Summary of Changes to PTA Med List  New: Z-pack/prednisone  Discontinued: pt stopped levothyroxine  Changed: n/a    Patient was asked about OTC/herbal products specifically.  PTA med list reflects this.    In the past week, patient estimated taking medication this percent of the time:  greater than 90%.    Allergies were reviewed, assessed, and updated with the patient.      Patient did not bring any medications to the hospital and can't retrieve from home. No multi-dose medications are available for use during hospital stay.     The information provided in this note is only as accurate as the sources available at the time of the update(s).    Thank you for the opportunity to participate in the care of this patient.    Hailee Kirby RPH  11/28/2022 11:45 AM

## 2022-11-28 NOTE — H&P
ADMISSION HISTORY & PHYSICAL      [place] Nixon, [phone]  ASSESSMENT AND PLAN:  67 year old female with a history of asthma, obesity, HTN, diabetes, CKD who presents with shortness of breath and cough.      Acute respiratory insufficiency secondary to RSV   Procalcitonin mildly elevated-not suspicious for bacterial pneumonia   No indication for antibiotics   Chest x-ray with no consolidation   Received 2 days of prednisone and azithromycin outpatient  from urgent care   Albuterol and IV steroids      NSTEMI   Demand related ischemia with troponin of 64 trending down to 58   No plan for heparin drip at this time.  Check ECHO   Cardiac telemetry overnight    Concerns for fluid overload   Patient with orthopnea, lower extremity edema and elevated BNP at 2400   Continue home HCTZ.  Trial dose of Lasix    BRAXTON in the setting of likely CKD   Patient notes she was taken off some medications due to her kidney function   Baseline creatinine appears to be 1.8-1.9.  Likely has CKD stage IIIb   Monitor kidney function with trial of Lasix   Consider nephrology consult if kidney function does not improve.  May need follow-up outpatient.    Hypothyroidism   States she has not been taking her levothyroxine   TSH elevated at 7.05.  T4 pending   Resume previous dosing of levothyroxine    Hypertensive urgency   Home medications resumed with improvement in blood pressure   Continuing home HCTZ, metoprolol.  Holding irbesartan due to BRAXTON   Hydralazine as needed    Hyperglycemia in the setting of known diabetes   Home dosing of Tresiba 48 units at night   A1c of 10.2   Initiate Lantus, carb counting with meals and sliding scale.  Adjust as needed.  Patient likely to have worsened hyperglycemia on current steroids.    Thrombocytopenia-likely related to acute illness    Code Status: Full Code  DVT prophylaxis: Heparin subcu    Disposition:  -Anticipated Length of Stay in midnights and medical necessity (including a midnight in the  "Emergency Department after triage if applicable): 2 days  -Discharge barriers: Improvement in respiratory status, ECHO, improvement in BRAXTON    Clinically Significant Risk Factors Present on Admission                  # Hypertension: home medication list includes antihypertensive(s)     # Obesity: Estimated body mass index is 33.66 kg/m  as calculated from the following:    Height as of this encounter: 1.6 m (5' 3\").    Weight as of this encounter: 86.2 kg (190 lb).               CHIEF COMPLAINT:  Shortness of breath    HISTORY OF PRESENTING ILLNESS:  Patient is a 67 year old female with history significant for asthma, obesity, HTN, diabetes, CKD who presents with shortness of breath and cough.  Patient has been experiencing generalized fatigue for a \"long time\".  Several days ago patient began to experience worsening cough with shortness of breath.  She was evaluated at urgent care on 11/25 and initiated on prednisone and azithromycin for a viral infection.  She took 2 days worth of azithromycin and prednisone but continued to have difficulty breathing.  She presented to the emergency department for further evaluation.  On my evaluation, patient notes that her breathing has been difficult with increased cough.  Denies any fevers.  States she has had difficulty lying flat at night due to difficulty breathing and shortness of breath.  Also complains of worsened lower extremity edema for several days.  No chest pain.  No other complaints at this time.    PMH/PSH:  Patient Active Problem List   Diagnosis     Insulin dependent type 2 diabetes mellitus (H)     Hyperlipidemia     Obesity     Essential hypertension, benign     Esophageal Reflux     Anxiety     Chronic fatigue syndrome     Hypothyroidism     Long term current use of insulin (H)     Macromastia     Polyneuropathy due to type 2 diabetes mellitus (H)     Recurrent major depression in full remission (H)     Tobacco user     Vitamin D deficiency     " Gastroesophageal reflux disease, esophagitis presence not specified     Obesity (BMI 30.0-34.9)     COVID-19     Acute kidney injury (H)     Viral pneumonia     SOB (shortness of breath)       ALLERGIES:  Allergies   Allergen Reactions     Alprazolam Other (See Comments)     Cephalexin Rash and Swelling     Exenatide Other (See Comments)     Iodinated Contrast Media [Diagnostic X-Ray Materials] Unknown     Lisinopril Other (See Comments)     Nitrofurantoin Monohyd/M-Cryst [Nitrofurantoin] Rash     Sulfa (Sulfonamide Antibiotics) [Sulfa Drugs] Other (See Comments)       MEDICATIONS:  Reviewed.  Current Facility-Administered Medications   Medication     acetaminophen (TYLENOL) tablet 650 mg    Or     acetaminophen (TYLENOL) Suppository 650 mg     albuterol (PROVENTIL HFA/VENTOLIN HFA) inhaler     albuterol (PROVENTIL) neb solution 2.5 mg     albuterol (PROVENTIL) neb solution 2.5 mg     glucose gel 15-30 g    Or     dextrose 50 % injection 25-50 mL    Or     glucagon injection 1 mg     heparin ANTICOAGULANT injection 5,000 Units     [Held by provider] hydrochlorothiazide (MICROZIDE) capsule 12.5 mg     insulin aspart (NovoLOG) injection (RAPID ACTING)     insulin aspart (NovoLOG) injection (RAPID ACTING)     insulin aspart (NovoLOG) injection (RAPID ACTING)     insulin glargine (LANTUS PEN) injection 10 Units     [Held by provider] irbesartan (AVAPRO) tablet 300 mg     [Held by provider] levothyroxine (SYNTHROID/LEVOTHROID) tablet 75 mcg     lidocaine (LMX4) cream     lidocaine 1 % 0.1-1 mL     melatonin tablet 1 mg     methylPREDNISolone sodium succinate (solu-MEDROL) injection 62.5 mg     metoprolol tartrate (LOPRESSOR) tablet 100 mg     [START ON 11/29/2022] omeprazole (priLOSEC) CR capsule 20 mg     ondansetron (ZOFRAN ODT) ODT tab 4 mg    Or     ondansetron (ZOFRAN) injection 4 mg     polyethylene glycol (MIRALAX) Packet 17 g     rosuvastatin (CRESTOR) tablet 5 mg     senna-docusate (SENOKOT-S/PERICOLACE) 8.6-50  MG per tablet 1 tablet    Or     senna-docusate (SENOKOT-S/PERICOLACE) 8.6-50 MG per tablet 2 tablet     sodium chloride (PF) 0.9% PF flush 3 mL     sodium chloride (PF) 0.9% PF flush 3 mL     Current Outpatient Medications   Medication     albuterol (PROAIR HFA;PROVENTIL HFA;VENTOLIN HFA) 90 mcg/actuation inhaler     azithromycin (ZITHROMAX) 250 MG tablet     hydroCHLOROthiazide (MICROZIDE) 12.5 mg capsule     irbesartan (AVAPRO) 300 MG tablet     levothyroxine (SYNTHROID) 75 MCG tablet     metoprolol tartrate (LOPRESSOR) 50 MG tablet     NOVOLOG FLEXPEN 100 UNIT/ML soln     omeprazole (PRILOSEC) 20 MG capsule     predniSONE (DELTASONE) 20 MG tablet     rosuvastatin (CRESTOR) 5 MG tablet     TRESIBA FLEXTOUCH 100 UNIT/ML pen     BD PEN NEEDLE ALEJANDRO 2ND GEN 32G X 4 MM miscellaneous     Lancets (ONETOUCH DELICA PLUS [id number]) [facility]       SOCIAL HISTORY:  Social History     Socioeconomic History     Marital status:      Spouse name: Not on file     Number of children: Not on file     Years of education: Not on file     Highest education level: Not on file   Occupational History     Not on file   Tobacco Use     Smoking status: Former     Types: Cigarettes     Quit date: 1992     Years since quittin.9     Smokeless tobacco: Never   Substance and Sexual Activity     Alcohol use: Yes     Drug use: Never     Sexual activity: Not Currently     Birth control/protection: Surgical   Other Topics Concern     Not on file   Social History Narrative     Not on file     Social Determinants of Health     Financial Resource Strain: Not on file   Food Insecurity: Not on file   Transportation Needs: Not on file   Physical Activity: Not on file   Stress: Not on file   Social Connections: Not on file   Intimate Partner Violence: Not on file   Housing Stability: Not on file       FAMILY HISTORY:  Family History   Problem Relation Age of Onset     Breast Cancer Maternal Aunt 45.00     Alzheimer Disease Mother       "Hypertension Mother      Chronic Obstructive Pulmonary Disease Father        ROS:  12 point review of systems is reviewed and is negative except for what has already been mention in the history of presenting illness.     PHYSICAL EXAM:  BP (!) 166/74   Pulse 63   Temp 97.9  F (36.6  C) (Oral)   Resp 14   Ht 1.6 m (5' 3\")   Wt 86.2 kg (190 lb)   SpO2 94%   BMI 33.66 kg/m    No intake/output data recorded.  No intake/output data recorded.  GENRL: Alert and answering questions appropriately.  Mildly anxious on exam.  No respiratory distress.  Sitting up in bed.    HEENT: no lymphadenopathy or thyromegaly  CHEST: Diffuse inspiratory and expiratory wheezing throughout.  No tachypnea.  Coughing with deep inspiration.  HEART: Regular rate and rhythm, S1S2 auscultated. No murmurs  ABDMN: Soft. Non-tender, non-distended. No organomegaly. No guarding or rigidity. Bowel sounds present   EXTRM: + pedal edema bilaterally  NEURO: Following commands moving all extremities.    PSYCH: Mildly anxious.    INTGM: No skin rash, no cyanosis or clubbing      DIAGNOSTIC DATA:  Recent Results (from the past 24 hour(s))   INR    Collection Time: 11/28/22  7:13 AM   Result Value Ref Range    INR     Comprehensive metabolic panel    Collection Time: 11/28/22  7:13 AM   Result Value Ref Range    Sodium 136 136 - 145 mmol/L    Potassium 3.9 3.4 - 5.3 mmol/L    Chloride 99 98 - 107 mmol/L    Carbon Dioxide (CO2) 27 22 - 29 mmol/L    Anion Gap 10 7 - 15 mmol/L    Urea Nitrogen 38.3 (H) 8.0 - 23.0 mg/dL    Creatinine 2.15 (H) 0.51 - 0.95 mg/dL    Calcium 8.8 8.8 - 10.2 mg/dL    Glucose 243 (H) 70 - 99 mg/dL    Alkaline Phosphatase 118 (H) 35 - 104 U/L    AST 31 10 - 35 U/L    ALT 22 10 - 35 U/L    Protein Total 6.3 (L) 6.4 - 8.3 g/dL    Albumin 3.6 3.5 - 5.2 g/dL    Bilirubin Total 0.6 <=1.2 mg/dL    GFR Estimate 24 (L) >60 mL/min/1.73m2   Magnesium    Collection Time: 11/28/22  7:13 AM   Result Value Ref Range    Magnesium 1.9 1.7 - 2.3 " mg/dL   Troponin T, High Sensitivity    Collection Time: 11/28/22  7:13 AM   Result Value Ref Range    Troponin T, High Sensitivity 69 (H) <=14 ng/L   Nt probnp inpatient    Collection Time: 11/28/22  7:13 AM   Result Value Ref Range    N terminal Pro BNP Inpatient 2,370 (H) 0 - 900 pg/mL   Procalcitonin    Collection Time: 11/28/22  7:13 AM   Result Value Ref Range    Procalcitonin 0.21 (H) <0.05 ng/mL   Extra Green Top (Lithium Heparin) Tube    Collection Time: 11/28/22  7:13 AM   Result Value Ref Range    Hold Specimen JIC    Extra Red Top Tube    Collection Time: 11/28/22  7:26 AM   Result Value Ref Range    Hold Specimen JIC    Symptomatic; Unknown Influenza A/B & SARS-CoV2 (COVID-19) Virus PCR Multiplex Nasopharyngeal    Collection Time: 11/28/22  8:13 AM    Specimen: Nasopharyngeal; Swab   Result Value Ref Range    Influenza A PCR Negative Negative    Influenza B PCR Negative Negative    RSV PCR Positive (A) Negative    SARS CoV2 PCR Negative Negative   Partial thromboplastin time    Collection Time: 11/28/22  8:35 AM   Result Value Ref Range    aPTT 24 22 - 38 Seconds   CBC with platelets and differential    Collection Time: 11/28/22  8:35 AM   Result Value Ref Range    WBC Count 6.0 4.0 - 11.0 10e3/uL    RBC Count 4.29 3.80 - 5.20 10e6/uL    Hemoglobin 13.2 11.7 - 15.7 g/dL    Hematocrit 39.1 35.0 - 47.0 %    MCV 91 78 - 100 fL    MCH 30.8 26.5 - 33.0 pg    MCHC 33.8 31.5 - 36.5 g/dL    RDW 12.7 10.0 - 15.0 %    Platelet Count 146 (L) 150 - 450 10e3/uL    % Neutrophils 78 %    % Lymphocytes 13 %    % Monocytes 8 %    % Eosinophils 1 %    % Basophils 0 %    % Immature Granulocytes 0 %    NRBCs per 100 WBC 0 <1 /100    Absolute Neutrophils 4.7 1.6 - 8.3 10e3/uL    Absolute Lymphocytes 0.8 0.8 - 5.3 10e3/uL    Absolute Monocytes 0.5 0.0 - 1.3 10e3/uL    Absolute Eosinophils 0.0 0.0 - 0.7 10e3/uL    Absolute Basophils 0.0 0.0 - 0.2 10e3/uL    Absolute Immature Granulocytes 0.0 <=0.4 10e3/uL    Absolute NRBCs  0.0 10e3/uL   D dimer quantitative    Collection Time: 11/28/22  8:35 AM   Result Value Ref Range    D-Dimer Quantitative 0.93 (H) 0.00 - 0.50 ug/mL FEU   Hemoglobin A1c    Collection Time: 11/28/22  8:35 AM   Result Value Ref Range    Hemoglobin A1C 10.2 (H) <5.7 %   INR    Collection Time: 11/28/22  9:20 AM   Result Value Ref Range    INR 1.00 0.85 - 1.15   Troponin T, High Sensitivity (now)    Collection Time: 11/28/22  9:20 AM   Result Value Ref Range    Troponin T, High Sensitivity 58 (H) <=14 ng/L   TSH with free T4 reflex    Collection Time: 11/28/22  9:20 AM   Result Value Ref Range    TSH 7.05 (H) 0.30 - 4.20 uIU/mL     All lab studies reviewed personally  Radiology report reviewed.      Tamiko Cruz DO  Hospitalist Service  [facility]  Text page via [employer] Paging/Directory

## 2022-11-28 NOTE — ED PROVIDER NOTES
EMERGENCY DEPARTMENT ENCOUNTER      NAME: Mariama Keene  AGE: 67 year old female  YOB: 1955  MRN: [id number]  EVALUATION DATE & TIME: 2022  6:21 AM    PCP: Indiana Nixon    ED PROVIDER: Tamiko Mathias M.D.        Chief Complaint   Patient presents with     Shortness of Breath     Cough         FINAL IMPRESSION:    1. RSV infection    2. SOB (shortness of breath)    3. Elevated troponin    4. Elevated brain natriuretic peptide (BNP) level    5. Hypertension, unspecified type            MEDICAL DECISION MAKIN year old female with history of asthma, hypertension, who presents emergency department for ongoing chest tightness and shortness of breath now for several days.  Found to be RSV positive.  Did not really feel any better after nebulizer treatments which is understandable.  BNP elevated and troponin mildly elevated.  D-dimer also elevated and may be more related to her heart strain and age.  Plan at this time is admission to the hospitalist service to try to see if we can get her respiratory status a little bit more optimized, and consideration even for things like an echo.  If you still of ongoing concerns about possible PE then a VQ scan or CT scan with premedication may be considered.  She is otherwise hemodynamically stable at this time.      ED COURSE:  6:37 AM I met with the patient to gather history and perform my exam. ED course and treatment discussed. PT has not taken her BP meds today or any of her morning meds. She has them with her and we will have her take her morning meds (including BP meds).    7:42 AM Awaiting labs to be sent to lab.    8:34 AM  Per RN documentation Arriaga, Melissa, RN:  Pt taking home meds including metoprolol, hydroclorithaizide, Irbisartan, and omeprazole.         9:21 AM I rechecked the patient and updated them. Pt denies any change with nebulizer treatment. +RSV on tests. +BNP.  Troponin also slightly elevated for age.  Plan is to repeat  troponin, do VQ scan (patient reports contrast allergy) and will check pulse ox with ambulation.  We will then determine disposition.    9:36 AM  Spoke with patient.  She reports contrast allergy.  Initially thought about doing a VQ scan, but more likely related to RSV and may be a little heart strain causing slightly elevated troponin and BNP.  Ultimately discussed with hospitalist and the plan is admission to the hospital to cardiac telemetry for ongoing management.  Did ambulate the patient and the lowest her pulse ox went down to was 92% on room air with ambulation.  Heart rate remained in the 80s.  Patient comfortable with this plan.  We will hold off on VQ scan at this time after discussion with the hospitalist.  Hospitalist has requested telemetry under inpatient status.    9:49 AM  Patient's troponin appears stable at this time.    I do not think that this represents rib fractures, allergic reaction, COPD exacerbation, asthma exacerbation, pneumonia, myocarditis, pericarditis, endocarditis, PE, ruptured AAA, pneumothorax, aortic dissection, bowel obstruction, or other such etiologies at this time.      COVID-19 PPE worn during patient evaluation:  Mask: n95 and homemade masks   Eye Protection: goggles   Gown: none   Hair cover: yes  Face shield: none   Patient wearing a mask: yes     At the conclusion of the encounter I discussed the results of all of the tests and the disposition. Their questions were answered. The patient (and any family present) acknowledged understanding and were agreeable with the care plan.      CONSULTANTS:  none        MEDICATIONS GIVEN IN THE EMERGENCY:  Medications   ipratropium - albuterol 0.5 mg/2.5 mg/3 mL (DUONEB) neb solution 3 mL (3 mLs Nebulization Given 11/28/22 0729)           NEW PRESCRIPTIONS STARTED AT TODAY'S ER VISIT     Medication List      There are no discharge medications for this visit.             CONDITION:  stable        DISPOSITION:  Card tele ip as accepted  "by Dr. Cruz, hospitalist         =================================================================  =================================================================  TRIAGE ASSESSMENT:  Pt presents from home for evaluation of SOB with cough. She was seen in UC on 11/25 and diagnosed with Asthma exacerbation (negative for covid and flu), given a Z-pack and steroids. Pt also endorses chest tightness, generalized weakness, congestion, and nausea without vomiting.            ED Triage Vitals [11/28/22 0455]   Enc Vitals Group      BP (!) 237/107      Pulse 80      Resp 24      Temp 98.7  F (37.1  C)      Temp src Temporal      SpO2 92 %      Weight 86.2 kg (190 lb)      Height 1.6 m (5' 3\")          ================================================================  ================================================================    HPI    Patient information was obtained from: patient    Use of GoYoDeoreter: N/A     Mariama Keene is a 67 year old female with history of asthma, obesity, HTN, diabetes who presents to the ER with complaints of cough, chest tightness, SOB.    Patient was seen at [facility] on 11/25/22 for cough and shortness of breath.  She was diagnosed with viral URI and asthma exacerbation and was started on azithromycin and prednisone.  She has taken 2 days worth of doses (today would be her third day but she has not yet taken her doses for today), but is not feeling any better.  She reports that she coughed so hard sometimes she gets lightheaded.    She denies true fevers but states at times she will get the chills. She also reports that her chest feels tight, but otherwise denies any abdominal pain, vomiting or diarrhea.  She states she was COVID and influenza negative the other day.    She has been using her albuterol inhaler every 4 hours without any improvement.  A friend of hers also had a nebulizer machine which she has used twice, her last dose was last night at 6 PM without any " improvement.    Patient's electronic medical list indicates that she is on Eliquis, but she states that was a few years ago. She was only on it for 1 month when she had COVID back then.  She is not on it anymore.    REVIEW OF SYSTEMS  Review of Systems   Constitutional: Positive for chills. Negative for fever.   Respiratory: Positive for cough, chest tightness and shortness of breath.    Cardiovascular: Negative for chest pain.   Gastrointestinal: Negative for abdominal pain and vomiting.   Genitourinary: Negative for dysuria.   Allergic/Immunologic: Negative for immunocompromised state.   All other systems reviewed and are negative.          PAST MEDICAL HISTORY:  Past Medical History:   Diagnosis Date     Asthma      Asthma      Asthma      Diabetes (H)      Diabetes (H)      Diabetes (H)      Hypertension      Hypertension      Hypertension      Panic attacks      Panic attacks      Panic attacks          PAST SURGICAL HISTORY:  Past Surgical History:   Procedure Laterality Date     ABSCESS DRAINAGE      On back     HYSTERECTOMY      age 33 - partial     NEPHRECTOMY PARTIAL Right      OOPHORECTOMY      age 48         CURRENT MEDICATIONS:    Prior to Admission medications    Medication Sig Start Date End Date Taking? Authorizing Provider   albuterol (PROAIR HFA;PROVENTIL HFA;VENTOLIN HFA) 90 mcg/actuation inhaler [ALBUTEROL (PROAIR HFA;PROVENTIL HFA;VENTOLIN HFA) 90 MCG/ACTUATION INHALER] Inhale 2 puffs every 4 (four) hours as needed for wheezing (bronchospasm). 8/21/20   Didier Urban MD   apixaban ANTICOAGULANT (ELIQUIS) 2.5 mg Tab tablet [APIXABAN ANTICOAGULANT (ELIQUIS) 2.5 MG TAB TABLET] Take 1 tablet (2.5 mg total) by mouth 2 (two) times a day.  Patient not taking: Reported on 6/29/2022 8/21/20   Didier Urban MD   BD PEN NEEDLE ALEJANDRO 2ND GEN 32G X 4 MM miscellaneous USE THREE TIMES DAILY WITH NOVOLOG. 6/10/22   Reported, Patient   glipiZIDE-metFORMIN (METAGLIP) 5-500 mg per tablet  [GLIPIZIDE-METFORMIN (METAGLIP) 5-500 MG PER TABLET] Take 2 tablets by mouth 2 (two) times a day.  Patient not taking: Reported on 6/29/2022 8/21/20   Didier Urban MD   hydroCHLOROthiazide (MICROZIDE) 12.5 mg capsule [HYDROCHLOROTHIAZIDE (MICROZIDE) 12.5 MG CAPSULE] Take 1 capsule (12.5 mg total) by mouth daily. 8/22/20   Didier Urban MD   irbesartan (AVAPRO) 300 MG tablet [IRBESARTAN (AVAPRO) 300 MG TABLET] Take 1 tablet (300 mg total) by mouth daily. 8/21/20   Didier Urban MD   Lancets (ONETOUCH DELICA PLUS [id number]) MISC TEST THREE TIMES DAILY 9/4/21   Reported, Patient   LEVEMIR U-100 INSULIN 100 unit/mL injection [LEVEMIR U-100 INSULIN 100 UNIT/ML INJECTION] Inject 45 Units under the skin at bedtime. 11.9 Type 2 without complications  Patient not taking: Reported on 6/29/2022 8/21/20   Didier Urban MD   levothyroxine (SYNTHROID) 75 MCG tablet [LEVOTHYROXINE (SYNTHROID) 75 MCG TABLET] Take 1 tablet (75 mcg total) by mouth Daily at 6:00 am. 8/22/20   Didier Urban MD   metoprolol tartrate (LOPRESSOR) 50 MG tablet [METOPROLOL TARTRATE (LOPRESSOR) 50 MG TABLET] Take 2 tablets (100 mg total) by mouth 2 (two) times a day. 8/21/20   Didier Urban MD   NOVOLOG FLEXPEN 100 UNIT/ML soln  6/16/22   Reported, Patient   omeprazole (PRILOSEC) 20 MG capsule [OMEPRAZOLE (PRILOSEC) 20 MG CAPSULE] Take 1 capsule (20 mg total) by mouth daily before breakfast. 8/21/20   Didier Urban MD   rosuvastatin (CRESTOR) 5 MG tablet [ROSUVASTATIN (CRESTOR) 5 MG TABLET] Take 1 tablet (5 mg total) by mouth daily. 8/21/20   Didier Urban MD   SYSTANE ULTRA 0.4-0.3 % SOLN ophthalmic solution INSTILL 1 DROP IN BOTH EYES FOUR TIMES DAILY AS NEEDED 11/8/21   Reported, Patient   TRESIBA FLEXTOUCH 100 UNIT/ML pen ADMINISTER 48 UNITS UNDER THE SKIN EVERY DAY 5/24/22   Reported, Patient   White Petrolatum-Mineral Oil (REFRESH LACRI-LUBE) OINT APPLY A THIN LAYER INTO BOTH EYES AS NEEDED 11/8/21   Reported, Patient  "  zinc 50 mg Tab [ZINC 50 MG TAB] Take 50 mg by mouth daily.  Patient not taking: Reported on 2022   Didier Urban MD         ALLERGIES:  Allergies   Allergen Reactions     Alprazolam Other (See Comments)     Cephalexin Rash and Swelling     Exenatide Other (See Comments)     Iodinated Contrast Media [Diagnostic X-Ray Materials] Unknown     Lisinopril Other (See Comments)     Nitrofurantoin Monohyd/M-Cryst [Nitrofurantoin] Rash     Sulfa (Sulfonamide Antibiotics) [Sulfa Drugs] Other (See Comments)         FAMILY HISTORY:  Family History   Problem Relation Age of Onset     Breast Cancer Maternal Aunt 45.00     Alzheimer Disease Mother      Hypertension Mother      Chronic Obstructive Pulmonary Disease Father          SOCIAL HISTORY:  Social History     Socioeconomic History     Marital status:    Tobacco Use     Smoking status: Former     Types: Cigarettes     Quit date: 1992     Years since quittin.9     Smokeless tobacco: Never   Substance and Sexual Activity     Alcohol use: Yes     Drug use: Never     Sexual activity: Not Currently     Birth control/protection: Surgical         VITALS:  Patient Vitals for the past 24 hrs:   BP Temp Temp src Pulse Resp SpO2 Height Weight   22 0930 (!) 187/86 -- -- 78 22 95 % -- --   22 0915 135/67 -- -- 64 15 93 % -- --   22 0900 (!) 160/76 -- -- 65 13 92 % -- --   22 0845 (!) 162/68 -- -- 67 10 94 % -- --   22 0830 (!) 177/84 -- -- 75 20 95 % -- --   22 0825 (!) 200/88 -- -- 79 22 95 % -- --   22 0700 (!) 202/98 -- -- -- -- -- -- --   22 0645 (!) 197/88 -- -- 77 -- 93 % -- --   22 0630 (!) 201/96 97.9  F (36.6  C) Oral 74 -- 94 % -- --   22 0505 (!) 209/145 -- -- -- -- 93 % -- --   22 0455 (!) 237/107 98.7  F (37.1  C) Temporal 80 24 92 % 1.6 m (5' 3\") 86.2 kg (190 lb)       Wt Readings from Last 3 Encounters:   22 86.2 kg (190 lb)   22 88.5 kg (195 lb)   22 83.5 " kg (184 lb)       Estimated Creatinine Clearance: 25.7 mL/min (A) (based on SCr of 2.15 mg/dL (H)).    PHYSICAL EXAM    Constitutional:  Well developed, Well nourished, NAD, GCS 15  HENT:  Normocephalic, Atraumatic, Bilateral external ears normal, Nose normal. Neck- Supple, No stridor.   Eyes:  PERRL, EOMI, Conjunctiva normal, No discharge.  Respiratory:  Normal breath sounds, No respiratory distress, + wheezing, Speaks full sentences easily.   +dry cough.   Cardiovascular:  Normal heart rate, Regular rhythm, No rubs, No gallops.   GI:  +obesity.  Bowel sounds normal, Soft, No tenderness, No masses, No flank tenderness. No rebound or guarding.   : deferred  Musculoskeletal: No cyanosis, No clubbing. Good range of motion in all major joints. No major deformities noted.   Integument:  Warm, Dry, No erythema, No rash.  No petechiae.  Neurologic:  Alert & oriented x 3,No focal deficits noted.   Psychiatric:  Affect normal, Cooperative         LAB:  All pertinent labs reviewed and interpreted.  Recent Results (from the past 24 hour(s))   INR    Collection Time: 11/28/22  7:13 AM   Result Value Ref Range    INR     Comprehensive metabolic panel    Collection Time: 11/28/22  7:13 AM   Result Value Ref Range    Sodium 136 136 - 145 mmol/L    Potassium 3.9 3.4 - 5.3 mmol/L    Chloride 99 98 - 107 mmol/L    Carbon Dioxide (CO2) 27 22 - 29 mmol/L    Anion Gap 10 7 - 15 mmol/L    Urea Nitrogen 38.3 (H) 8.0 - 23.0 mg/dL    Creatinine 2.15 (H) 0.51 - 0.95 mg/dL    Calcium 8.8 8.8 - 10.2 mg/dL    Glucose 243 (H) 70 - 99 mg/dL    Alkaline Phosphatase 118 (H) 35 - 104 U/L    AST 31 10 - 35 U/L    ALT 22 10 - 35 U/L    Protein Total 6.3 (L) 6.4 - 8.3 g/dL    Albumin 3.6 3.5 - 5.2 g/dL    Bilirubin Total 0.6 <=1.2 mg/dL    GFR Estimate 24 (L) >60 mL/min/1.73m2   Magnesium    Collection Time: 11/28/22  7:13 AM   Result Value Ref Range    Magnesium 1.9 1.7 - 2.3 mg/dL   Troponin T, High Sensitivity    Collection Time: 11/28/22  7:13  AM   Result Value Ref Range    Troponin T, High Sensitivity 69 (H) <=14 ng/L   Nt probnp inpatient    Collection Time: 11/28/22  7:13 AM   Result Value Ref Range    N terminal Pro BNP Inpatient 2,370 (H) 0 - 900 pg/mL   Procalcitonin    Collection Time: 11/28/22  7:13 AM   Result Value Ref Range    Procalcitonin 0.21 (H) <0.05 ng/mL   Extra Green Top (Lithium Heparin) Tube    Collection Time: 11/28/22  7:13 AM   Result Value Ref Range    Hold Specimen JIC    Extra Red Top Tube    Collection Time: 11/28/22  7:26 AM   Result Value Ref Range    Hold Specimen JIC    Symptomatic; Unknown Influenza A/B & SARS-CoV2 (COVID-19) Virus PCR Multiplex Nasopharyngeal    Collection Time: 11/28/22  8:13 AM    Specimen: Nasopharyngeal; Swab   Result Value Ref Range    Influenza A PCR Negative Negative    Influenza B PCR Negative Negative    RSV PCR Positive (A) Negative    SARS CoV2 PCR Negative Negative   Partial thromboplastin time    Collection Time: 11/28/22  8:35 AM   Result Value Ref Range    aPTT 24 22 - 38 Seconds   CBC with platelets and differential    Collection Time: 11/28/22  8:35 AM   Result Value Ref Range    WBC Count 6.0 4.0 - 11.0 10e3/uL    RBC Count 4.29 3.80 - 5.20 10e6/uL    Hemoglobin 13.2 11.7 - 15.7 g/dL    Hematocrit 39.1 35.0 - 47.0 %    MCV 91 78 - 100 fL    MCH 30.8 26.5 - 33.0 pg    MCHC 33.8 31.5 - 36.5 g/dL    RDW 12.7 10.0 - 15.0 %    Platelet Count 146 (L) 150 - 450 10e3/uL    % Neutrophils 78 %    % Lymphocytes 13 %    % Monocytes 8 %    % Eosinophils 1 %    % Basophils 0 %    % Immature Granulocytes 0 %    NRBCs per 100 WBC 0 <1 /100    Absolute Neutrophils 4.7 1.6 - 8.3 10e3/uL    Absolute Lymphocytes 0.8 0.8 - 5.3 10e3/uL    Absolute Monocytes 0.5 0.0 - 1.3 10e3/uL    Absolute Eosinophils 0.0 0.0 - 0.7 10e3/uL    Absolute Basophils 0.0 0.0 - 0.2 10e3/uL    Absolute Immature Granulocytes 0.0 <=0.4 10e3/uL    Absolute NRBCs 0.0 10e3/uL   D dimer quantitative    Collection Time: 11/28/22  8:35 AM    Result Value Ref Range    D-Dimer Quantitative 0.93 (H) 0.00 - 0.50 ug/mL FEU   INR    Collection Time: 11/28/22  9:20 AM   Result Value Ref Range    INR 1.00 0.85 - 1.15   Troponin T, High Sensitivity (now)    Collection Time: 11/28/22  9:20 AM   Result Value Ref Range    Troponin T, High Sensitivity 58 (H) <=14 ng/L       Lab Results   Component Value Date    ABORH O POS 08/16/2020           RADIOLOGY:  Reviewed all pertinent imaging. Please see official radiology report.    XR Chest 1 View   Final Result   IMPRESSION: Both lungs remain clear. No adenopathy or effusion. Normal cardiac size and pulmonary vascularity. Mild degenerative changes both shoulders and the spine. No significant interval change.            EKG:    Indication: Chest pain    Performed at: 06:13am  Impression: Sinus rhythm at 86 bpm.  Flipped T waves noted in lead I, aVR, aVR.  ID interval 158 ms,  ms,  ms.  Occasional PACs noted.  Overall EKG does appear similar to one from June 17, 2022.    I have independently reviewed and interpreted the EKG(s) documented above.        PROCEDURES:  none      I, Ricki Avilez, am serving as a scribe to document services personally performed by Dr. Tamiko Mathias based on my observation and the provider's statements to me. I, Dr. Tamiko Mathias MD attest that Ricki Avilez is acting in a scribe capacity, has observed my performance of the services and has documented them in accordance with my direction.        Tamiko Mathias M.D. Located within [facility]  Emergency Medicine and Medical Toxicology  Former[facility] EMERGENCY DEPARTMENT  [facility] [facility] [address]  [phone]  Dept: [phone]           Tamiko Mathias MD  11/28/22 1752

## 2022-11-28 NOTE — TREATMENT PLAN
RCAT Treatment Plan    Patient Score: 7  Patient Acuity: 4    Clinical Indication for Therapy: history of bronchospasm, productive cough and history of mucous producing disease    Therapy Ordered: continue current therapy    Assessment Summary: pt with history of asthma positive for RSV. No home O2. CXR this morning clear, but BS are diminished wheezes on exam. She is a former tobacco user. Plan to reassess in 3 days or as needed.      Luis Greenberg, RT  11/28/2022

## 2022-11-28 NOTE — PROGRESS NOTES
Occupational Therapy      11/28/22 1430   Appointment Info   Signing Clinician's Name / Credentials (OT) Alexa Hinojosa OTR/L   Living Environment   People in Home alone   Current Living Arrangements apartment   Home Accessibility no concerns   Transportation Anticipated family or friend will provide   Living Environment Comments W/I shower w/ GB/Shower Chair   Self-Care   Usual Activity Tolerance good   Current Activity Tolerance good   Regular Exercise No   Equipment Currently Used at Home none   Fall history within last six months no   Activity/Exercise/Self-Care Comment Ind. W/ ADL routine   Instrumental Activities of Daily Living (IADL)   Previous Responsibilities meal prep;housekeeping;laundry;medication management;finances;driving   General Information   Onset of Illness/Injury or Date of Surgery 11/28/22   Referring Physician Tamiko Cruz DO   Additional Occupational Profile Info/Pertinent History of Current Problem 67 year old female with a history of asthma, obesity, HTN, diabetes, CKD who presents with shortness of breath and cough.   Existing Precautions/Restrictions fall   Cognitive Status Examination   Orientation Status orientation to person, place and time   Range of Motion Comprehensive   General Range of Motion no range of motion deficits identified   Bed Mobility   Bed Mobility No deficits identified   Transfers   Transfers bed-chair transfer;sit-stand transfer   Transfer Skill: Bed to Chair/Chair to Bed   Bed-Chair [place] (Transfers) verbal cues;supervision   Assistive Device (Bed-Chair Transfers) rolling walker   Sit-Stand Transfer   Sit-Stand [place] (Transfers) verbal cues   Balance   Balance Assessment no deficits were identified   Clinical Impression   Criteria for Skilled Therapeutic Interventions Met (OT) Evaluation only   OT Diagnosis decreased ADL ind.   OT Problem List-Impairments impacting ADL problems related to;activity tolerance impaired;mobility   Assessment  of Occupational Performance 1-3 Performance Deficits   Planned Therapy Interventions (OT) ADL retraining;strengthening;transfer training   Clinical Decision Making Complexity (OT) low complexity   Risk & Benefits of therapy have been explained evaluation/treatment results reviewed;patient   OT Total Evaluation Time   OT Eval, Low Complexity Minutes (37180) 8   OT Goals   Therapy Frequency (OT) One time eval and treatment   OT Predicted Duration/Target Date for Goal Attainment 11/28/22   OT Goals Transfers   OT: Transfer Modified independent;with assistive device   Interventions   Interventions Quick Adds Self-Care/Home Management   Self-Care/Home Management   Self-Care/Home Mgmt/ADL, Compensatory, Meal Prep Minutes (59723) 10   Symptoms Noted During/After Treatment (Meal Preparation/Planning Training) none   Treatment Detail/Skilled Intervention Pt on RA SpO2>90% throughout session, pt educated/trained in energy consesrvation/work simplification-pt also trained in pacing and taking rest breaks upon d/c home if fatiguing w/ tasks. Pt ind. w/ STS, Mod.I for ambulation, STS toileting Mod.I no LOB. At end of session OT reviewed tips to ensure pt does not fatigue too quickly upon return home, such as showering in AM when has the most energy, sit to do some household IADL tasks, space out time during activities. no further skilled OT   OT Discharge Planning   OT Plan d/c OT   OT Discharge Recommendation (DC Rec) home with assist   OT Rationale for DC Rec Mod.I for trnf w/o device no need for skilled OT services, OT ed/trained pt in various tips for energy conservation/work simplification for d/c home.   Total Session Time   Timed Code Treatment Minutes 10   Total Session Time (sum of timed and untimed services) 18

## 2022-11-28 NOTE — PROGRESS NOTES
Physical Therapy: Orders received. Chart reviewed and discussed with care team.? Physical Therapy not indicated due to patient is independent with all mobility per OT. No acute PT needs identified.? Defer discharge recommendations to OT.? Will complete orders.     Orquidea Kerns, PT, DPT

## 2022-11-29 PROBLEM — B33.8 RSV INFECTION: Status: ACTIVE | Noted: 2022-11-29

## 2022-11-29 PROBLEM — R79.89 ELEVATED BRAIN NATRIURETIC PEPTIDE (BNP) LEVEL: Status: ACTIVE | Noted: 2022-11-29

## 2022-11-29 PROBLEM — I10 HYPERTENSION, UNSPECIFIED TYPE: Status: ACTIVE | Noted: 2022-11-29

## 2022-11-29 PROBLEM — R79.89 ELEVATED TROPONIN: Status: ACTIVE | Noted: 2022-11-29

## 2022-11-29 LAB
ALBUMIN SERPL BCG-MCNC: 3 G/DL (ref 3.5–5.2)
ALP SERPL-CCNC: 110 U/L (ref 35–104)
ALT SERPL W P-5'-P-CCNC: 18 U/L (ref 10–35)
ANION GAP SERPL CALCULATED.3IONS-SCNC: 14 MMOL/L (ref 7–15)
AST SERPL W P-5'-P-CCNC: 26 U/L (ref 10–35)
BASOPHILS # BLD AUTO: 0 10E3/UL (ref 0–0.2)
BASOPHILS NFR BLD AUTO: 0 %
BILIRUB SERPL-MCNC: 0.5 MG/DL
BUN SERPL-MCNC: 41.4 MG/DL (ref 8–23)
CALCIUM SERPL-MCNC: 8.4 MG/DL (ref 8.8–10.2)
CHLORIDE SERPL-SCNC: 95 MMOL/L (ref 98–107)
CREAT SERPL-MCNC: 1.85 MG/DL (ref 0.51–0.95)
DEPRECATED HCO3 PLAS-SCNC: 25 MMOL/L (ref 22–29)
EOSINOPHIL # BLD AUTO: 0 10E3/UL (ref 0–0.7)
EOSINOPHIL NFR BLD AUTO: 0 %
ERYTHROCYTE [DISTWIDTH] IN BLOOD BY AUTOMATED COUNT: 12.5 % (ref 10–15)
GFR SERPL CREATININE-BSD FRML MDRD: 29 ML/MIN/1.73M2
GLUCOSE BLDC GLUCOMTR-MCNC: 114 MG/DL (ref 70–99)
GLUCOSE BLDC GLUCOMTR-MCNC: 178 MG/DL (ref 70–99)
GLUCOSE BLDC GLUCOMTR-MCNC: 192 MG/DL (ref 70–99)
GLUCOSE BLDC GLUCOMTR-MCNC: 322 MG/DL (ref 70–99)
GLUCOSE SERPL-MCNC: 355 MG/DL (ref 70–99)
HCT VFR BLD AUTO: 41.7 % (ref 35–47)
HGB BLD-MCNC: 14.2 G/DL (ref 11.7–15.7)
IMM GRANULOCYTES # BLD: 0 10E3/UL
IMM GRANULOCYTES NFR BLD: 0 %
LYMPHOCYTES # BLD AUTO: 0.5 10E3/UL (ref 0.8–5.3)
LYMPHOCYTES NFR BLD AUTO: 8 %
MCH RBC QN AUTO: 31 PG (ref 26.5–33)
MCHC RBC AUTO-ENTMCNC: 34.1 G/DL (ref 31.5–36.5)
MCV RBC AUTO: 91 FL (ref 78–100)
MONOCYTES # BLD AUTO: 0.2 10E3/UL (ref 0–1.3)
MONOCYTES NFR BLD AUTO: 3 %
NEUTROPHILS # BLD AUTO: 5 10E3/UL (ref 1.6–8.3)
NEUTROPHILS NFR BLD AUTO: 89 %
NRBC # BLD AUTO: 0 10E3/UL
NRBC BLD AUTO-RTO: 0 /100
PLATELET # BLD AUTO: 157 10E3/UL (ref 150–450)
POTASSIUM SERPL-SCNC: 4 MMOL/L (ref 3.4–5.3)
PROT SERPL-MCNC: 5.8 G/DL (ref 6.4–8.3)
RBC # BLD AUTO: 4.58 10E6/UL (ref 3.8–5.2)
SODIUM SERPL-SCNC: 134 MMOL/L (ref 136–145)
WBC # BLD AUTO: 5.6 10E3/UL (ref 4–11)

## 2022-11-29 PROCEDURE — 94640 AIRWAY INHALATION TREATMENT: CPT

## 2022-11-29 PROCEDURE — 99233 SBSQ HOSP IP/OBS HIGH 50: CPT | Performed by: INTERNAL MEDICINE

## 2022-11-29 PROCEDURE — 999N000157 HC STATISTIC RCP TIME EA 10 MIN

## 2022-11-29 PROCEDURE — 80053 COMPREHEN METABOLIC PANEL: CPT | Performed by: INTERNAL MEDICINE

## 2022-11-29 PROCEDURE — 250N000009 HC RX 250: Performed by: INTERNAL MEDICINE

## 2022-11-29 PROCEDURE — 250N000013 HC RX MED GY IP 250 OP 250 PS 637: Performed by: INTERNAL MEDICINE

## 2022-11-29 PROCEDURE — 85025 COMPLETE CBC W/AUTO DIFF WBC: CPT | Performed by: INTERNAL MEDICINE

## 2022-11-29 PROCEDURE — 36415 COLL VENOUS BLD VENIPUNCTURE: CPT | Performed by: INTERNAL MEDICINE

## 2022-11-29 PROCEDURE — 94640 AIRWAY INHALATION TREATMENT: CPT | Mod: 76

## 2022-11-29 PROCEDURE — 82040 ASSAY OF SERUM ALBUMIN: CPT | Performed by: INTERNAL MEDICINE

## 2022-11-29 PROCEDURE — 120N000001 HC R&B MED SURG/OB

## 2022-11-29 PROCEDURE — 250N000011 HC RX IP 250 OP 636: Performed by: INTERNAL MEDICINE

## 2022-11-29 RX ORDER — GUAIFENESIN AND DEXTROMETHORPHAN HYDROBROMIDE 600; 30 MG/1; MG/1
1 TABLET, EXTENDED RELEASE ORAL 2 TIMES DAILY PRN
Status: DISCONTINUED | OUTPATIENT
Start: 2022-11-29 | End: 2022-12-04 | Stop reason: HOSPADM

## 2022-11-29 RX ORDER — PANTOPRAZOLE SODIUM 40 MG/1
40 TABLET, DELAYED RELEASE ORAL
Status: DISCONTINUED | OUTPATIENT
Start: 2022-11-30 | End: 2022-12-04 | Stop reason: HOSPADM

## 2022-11-29 RX ORDER — PREDNISONE 20 MG/1
40 TABLET ORAL DAILY
Status: DISPENSED | OUTPATIENT
Start: 2022-11-30 | End: 2022-12-03

## 2022-11-29 RX ORDER — IRBESARTAN 300 MG/1
300 TABLET ORAL DAILY
Status: DISCONTINUED | OUTPATIENT
Start: 2022-11-29 | End: 2022-12-04 | Stop reason: HOSPADM

## 2022-11-29 RX ORDER — PANTOPRAZOLE SODIUM 40 MG/1
40 TABLET, DELAYED RELEASE ORAL
Status: DISCONTINUED | OUTPATIENT
Start: 2022-11-30 | End: 2022-11-29

## 2022-11-29 RX ORDER — ALPRAZOLAM 0.5 MG
0.5 TABLET ORAL 3 TIMES DAILY PRN
Status: DISCONTINUED | OUTPATIENT
Start: 2022-11-29 | End: 2022-11-30

## 2022-11-29 RX ORDER — FUROSEMIDE 10 MG/ML
40 INJECTION INTRAMUSCULAR; INTRAVENOUS EVERY 12 HOURS
Status: COMPLETED | OUTPATIENT
Start: 2022-11-29 | End: 2022-11-29

## 2022-11-29 RX ADMIN — OMEPRAZOLE 20 MG: 20 CAPSULE, DELAYED RELEASE ORAL at 08:53

## 2022-11-29 RX ADMIN — SENNOSIDES AND DOCUSATE SODIUM 1 TABLET: 50; 8.6 TABLET ORAL at 21:10

## 2022-11-29 RX ADMIN — INSULIN GLARGINE 35 UNITS: 100 INJECTION, SOLUTION SUBCUTANEOUS at 08:55

## 2022-11-29 RX ADMIN — INSULIN GLARGINE 35 UNITS: 100 INJECTION, SOLUTION SUBCUTANEOUS at 21:21

## 2022-11-29 RX ADMIN — METOPROLOL TARTRATE 100 MG: 100 TABLET, FILM COATED ORAL at 21:10

## 2022-11-29 RX ADMIN — ROSUVASTATIN CALCIUM 5 MG: 5 TABLET, FILM COATED ORAL at 21:14

## 2022-11-29 RX ADMIN — SENNOSIDES AND DOCUSATE SODIUM 1 TABLET: 50; 8.6 TABLET ORAL at 09:06

## 2022-11-29 RX ADMIN — ALBUTEROL SULFATE 2.5 MG: 2.5 SOLUTION RESPIRATORY (INHALATION) at 01:46

## 2022-11-29 RX ADMIN — ALBUTEROL SULFATE 2.5 MG: 2.5 SOLUTION RESPIRATORY (INHALATION) at 09:02

## 2022-11-29 RX ADMIN — INSULIN ASPART 19 UNITS: 100 INJECTION, SOLUTION INTRAVENOUS; SUBCUTANEOUS at 08:53

## 2022-11-29 RX ADMIN — IRBESARTAN 300 MG: 300 TABLET ORAL at 12:49

## 2022-11-29 RX ADMIN — GUAIFENESIN AND DEXTROMETHORPHAN HYDROBROMIDE 1 TABLET: 600; 30 TABLET, EXTENDED RELEASE ORAL at 21:49

## 2022-11-29 RX ADMIN — FUROSEMIDE 40 MG: 10 INJECTION, SOLUTION INTRAMUSCULAR; INTRAVENOUS at 21:14

## 2022-11-29 RX ADMIN — HEPARIN SODIUM 5000 UNITS: 5000 INJECTION, SOLUTION INTRAVENOUS; SUBCUTANEOUS at 12:49

## 2022-11-29 RX ADMIN — ALBUTEROL SULFATE 2.5 MG: 2.5 SOLUTION RESPIRATORY (INHALATION) at 15:20

## 2022-11-29 RX ADMIN — METOPROLOL TARTRATE 100 MG: 100 TABLET, FILM COATED ORAL at 09:06

## 2022-11-29 RX ADMIN — INSULIN ASPART 4 UNITS: 100 INJECTION, SOLUTION INTRAVENOUS; SUBCUTANEOUS at 13:01

## 2022-11-29 RX ADMIN — ALBUTEROL SULFATE 2.5 MG: 2.5 SOLUTION RESPIRATORY (INHALATION) at 21:24

## 2022-11-29 RX ADMIN — HYDROCHLOROTHIAZIDE 12.5 MG: 12.5 CAPSULE ORAL at 08:53

## 2022-11-29 RX ADMIN — FUROSEMIDE 40 MG: 10 INJECTION, SOLUTION INTRAMUSCULAR; INTRAVENOUS at 09:06

## 2022-11-29 RX ADMIN — LEVOTHYROXINE SODIUM 75 MCG: 0.03 TABLET ORAL at 08:53

## 2022-11-29 ASSESSMENT — ACTIVITIES OF DAILY LIVING (ADL)
TOILETING_ISSUES: NO
DRESSING/BATHING_DIFFICULTY: NO
ADLS_ACUITY_SCORE: 18
ADLS_ACUITY_SCORE: 35
ADLS_ACUITY_SCORE: 35
CHANGE_IN_FUNCTIONAL_STATUS_SINCE_ONSET_OF_CURRENT_ILLNESS/INJURY: NO
WEAR_GLASSES_OR_BLIND: NO
ADLS_ACUITY_SCORE: 35
WALKING_OR_CLIMBING_STAIRS_DIFFICULTY: NO
ADLS_ACUITY_SCORE: 18
DOING_ERRANDS_INDEPENDENTLY_DIFFICULTY: NO
ADLS_ACUITY_SCORE: 18
ADLS_ACUITY_SCORE: 35
ADLS_ACUITY_SCORE: 35
CONCENTRATING,_REMEMBERING_OR_MAKING_DECISIONS_DIFFICULTY: NO
DIFFICULTY_EATING/SWALLOWING: NO
ADLS_ACUITY_SCORE: 35
FALL_HISTORY_WITHIN_LAST_SIX_MONTHS: NO
ADLS_ACUITY_SCORE: 18

## 2022-11-29 NOTE — PROGRESS NOTES
[facility]    PROGRESS NOTE - Hospitalist Service    ASSESSMENT AND PLAN     Principal Problem:    SOB (shortness of breath)    Mariama Keene is a 67 year old female with a history of asthma, obesity, HTN, diabetes, CKD who presents with shortness of breath and cough.       Acute respiratory insufficiency secondary to RSV              Procalcitonin mildly elevated-not suspicious for bacterial pneumonia              No indication for antibiotics              Chest x-ray with no consolidation              Received 2 days of prednisone and azithromycin outpatient  from urgent care              Albuterol and IV steroids-transition to oral prednisone 11/30                       Minimal troponin elevation-likely demand related              Troponin of 64 trending down to 58.  Consider outpatient stress test               No plan for heparin drip at this time.    ECHO reviewed with EF 55 to 60%.  No wall motion abnormalities.  Discontinue cardiac telemetry     Concerns for fluid overload              Patient with orthopnea, lower extremity edema and elevated BNP at 2400              Continue home HCTZ.  IV Lasix 11/28 and 11/29     BRAXTON in the setting of likely CKD-appears to be back at baseline              Patient notes she was taken off some medications due to her kidney function              Baseline creatinine appears to be 1.8-1.9.  Likely has CKD stage IIIb              Monitor kidney function with trial of Lasix-creatinine 1.85 11/29 which appears to be her baseline based on previous labs              Consider nephrology follow-up outpatient.       Hypothyroidism              States she has not been taking her levothyroxine              TSH elevated at 7.05.  T4 low               Resume previous dosing of levothyroxine    Acute situational anxiety   Xanax as needed     Hypertensive urgency              Home medications resumed with improvement in blood pressure              Continuing home  HCTZ, metoprolol.  Resuming home irbesartan- was on hold due to BRAXTON               Hydralazine as needed     Hyperglycemia in the setting of known diabetes              Home dosing of Tresiba 48 units at night              A1c of 10.2              Initiate Lantus, carb counting with meals and sliding scale-increase 11/29 due to steroids   Patient with worsened hyperglycemia on current steroids.     Thrombocytopenia-likely related to acute illness    Barriers to discharge: IV diuresis    Anticipated length of stay: 1 more day      Present on Admission:    SOB (shortness of breath)      Subjective:  Patient extremely anxious on my arrival.  Very concerned about her heart, her lungs, her kidneys, stating she feels as though everything is falling apart.  Reassured her that things are moving in the right direction and plan will be likely discharge tomorrow.  Provided Xanax as needed for anxiety.  Patient states her breathing is much better today.    PHYSICAL EXAM  Temp:  [97.8  F (36.6  C)] 97.8  F (36.6  C)  Pulse:  [56-80] 69  Resp:  [11-49] 15  BP: (186-198)/(78-90) 186/78  SpO2:  [91 %-97 %] 91 %  Wt Readings from Last 1 Encounters:   11/28/22 86.2 kg (190 lb)     No intake or output data in the 24 hours ending 11/29/22 1216   Body mass index is 33.66 kg/m .    GENRL: Alert and answering questions appropriately.  Anxious on exam.  No respiratory distress.  Sitting up in bed.    HEENT: no lymphadenopathy or thyromegaly  CHEST:  No wheezing auscultated today.  No tachypnea.  Coughing with deep inspiration.  HEART: Regular rate and rhythm, S1S2 auscultated. No murmurs  ABDMN: Soft. Non-tender, non-distended. No organomegaly. No guarding or rigidity. Bowel sounds present   EXTRM: + pedal edema bilaterally  NEURO: Following commands moving all extremities.    PSYCH: Anxious.    INTGM: No skin rash, no cyanosis or clubbing    PERTINENT LABS/IMAGING:  Results for orders placed or performed during the hospital encounter of  11/28/22   XR Chest 1 View    Impression    IMPRESSION: Both lungs remain clear. No adenopathy or effusion. Normal cardiac size and pulmonary vascularity. Mild degenerative changes both shoulders and the spine. No significant interval change.   Echocardiogram Complete   Result Value Ref Range    LVEF  55-60%      Most Recent 3 CBC's:Recent Labs   Lab Test 11/29/22  0640 11/28/22  0835 06/17/22  0130   WBC 5.6 6.0 7.2   HGB 14.2 13.2 14.2   MCV 91 91 91    146* 171       Recent Labs   Lab Test 03/16/22  1011   CHOL 151   HDL 42*   LDL 58   TRIG 255*     Recent Labs   Lab Test 03/16/22  1011 07/14/21  1152 04/07/21  1107   LDL 58 74 117     Recent Labs   Lab Test 11/29/22  0808 11/29/22  0640   NA  --  134*   POTASSIUM  --  4.0   CHLORIDE  --  95*   CO2  --  25   * 355*   BUN  --  41.4*   CR  --  1.85*   GFRESTIMATED  --  29*   BILL  --  8.4*     Recent Labs   Lab Test 11/28/22  0835 08/17/20  1106   A1C 10.2* 10.3*     Recent Labs   Lab Test 11/29/22  0640 11/28/22  0835 06/17/22  0130   HGB 14.2 13.2 14.2     Recent Labs   Lab Test 06/17/22  0130 08/19/20  1407 08/17/20  1106   TROPONINI 0.02 0.03 0.03     Recent Labs   Lab Test 11/28/22  0713   NTBNPI 2,370*     Recent Labs   Lab Test 11/28/22  0920   TSH 7.05*     Recent Labs   Lab Test 11/28/22  0920 08/20/20  1026 08/19/20  1407   INR 1.00 1.03 0.93       Taimko Cruz DO  Hospitalist Service  [facility]  Text page via [facility] Paging/Directory

## 2022-11-29 NOTE — PROGRESS NOTES
"[facility] ED Handoff Report    ED Chief Complaint:     ED Diagnosis:  (B33.8) RSV infection  Comment:   Plan:     (R06.02) SOB (shortness of breath)  Comment:   Plan:     (R77.8) Elevated troponin  Comment:   Plan:     (R79.89) Elevated brain natriuretic peptide (BNP) level  Comment:   Plan:    (I10) Hypertension, unspecified type  Comment:   Plan:        PMH:    Past Medical History:   Diagnosis Date     Asthma      Asthma      Asthma      Diabetes (H)      Diabetes (H)      Diabetes (H)      Hypertension      Hypertension      Hypertension      Panic attacks      Panic attacks      Panic attacks         Code Status:  Full Code     Falls Risk: No Band: Applied    Current Living Situation/Residence: lives alone     Elimination Status: Continent: Yes     Activity Level: Independent    Patients Preferred Language:  English     Needed: No    Vital Signs:  BP (!) 143/66 (BP Location: Left arm)   Pulse 66   Temp 98.2  F (36.8  C) (Oral)   Resp 18   Ht 1.6 m (5' 3\")   Wt 86.2 kg (190 lb)   SpO2 94%   BMI 33.66 kg/m       Cardiac Rhythm: WNL    Pain Score: 0/10    Is the Patient Confused:  No    Last Food or Drink: 11/29/22     Focused Assessment:  67 year old female with a history of asthma, obesity, HTN, diabetes, CKD who presents with shortness of breath and cough.      Tests Performed: Done: Labs and Imaging    Treatments Provided:  Nebs, solumedrol. Will start IV prednisone tomorrow.    Family Dynamics/Concerns: No    Family Updated On Visitor Policy: Yes    Plan of Care Communicated to Family: Yes    Who Was Updated about Plan of Care: Pt's Son    Belongings Checklist Done and Signed by Patient: No    Medications sent with patient: Insulin Pens: Novolog and Lantus    Covid: asymptomatic , negative    Additional Information: Contact Precautions for RSV    RN: Raven Iraheta RN 11/29/2022 3:06 PM     "

## 2022-11-30 LAB
ANION GAP SERPL CALCULATED.3IONS-SCNC: 12 MMOL/L (ref 7–15)
BUN SERPL-MCNC: 55.1 MG/DL (ref 8–23)
CALCIUM SERPL-MCNC: 8.3 MG/DL (ref 8.8–10.2)
CHLORIDE SERPL-SCNC: 97 MMOL/L (ref 98–107)
CREAT SERPL-MCNC: 2.31 MG/DL (ref 0.51–0.95)
DEPRECATED HCO3 PLAS-SCNC: 27 MMOL/L (ref 22–29)
GFR SERPL CREATININE-BSD FRML MDRD: 22 ML/MIN/1.73M2
GLUCOSE BLDC GLUCOMTR-MCNC: 124 MG/DL (ref 70–99)
GLUCOSE BLDC GLUCOMTR-MCNC: 152 MG/DL (ref 70–99)
GLUCOSE BLDC GLUCOMTR-MCNC: 156 MG/DL (ref 70–99)
GLUCOSE BLDC GLUCOMTR-MCNC: 205 MG/DL (ref 70–99)
GLUCOSE BLDC GLUCOMTR-MCNC: 83 MG/DL (ref 70–99)
GLUCOSE SERPL-MCNC: 96 MG/DL (ref 70–99)
HOLD SPECIMEN: NORMAL
POTASSIUM SERPL-SCNC: 3.3 MMOL/L (ref 3.4–5.3)
POTASSIUM SERPL-SCNC: 3.8 MMOL/L (ref 3.4–5.3)
SODIUM SERPL-SCNC: 136 MMOL/L (ref 136–145)

## 2022-11-30 PROCEDURE — 250N000011 HC RX IP 250 OP 636: Performed by: HOSPITALIST

## 2022-11-30 PROCEDURE — 250N000009 HC RX 250: Performed by: INTERNAL MEDICINE

## 2022-11-30 PROCEDURE — 250N000013 HC RX MED GY IP 250 OP 250 PS 637: Performed by: HOSPITALIST

## 2022-11-30 PROCEDURE — 36415 COLL VENOUS BLD VENIPUNCTURE: CPT | Performed by: INTERNAL MEDICINE

## 2022-11-30 PROCEDURE — 999N000157 HC STATISTIC RCP TIME EA 10 MIN

## 2022-11-30 PROCEDURE — 250N000013 HC RX MED GY IP 250 OP 250 PS 637: Performed by: INTERNAL MEDICINE

## 2022-11-30 PROCEDURE — 94640 AIRWAY INHALATION TREATMENT: CPT

## 2022-11-30 PROCEDURE — 80048 BASIC METABOLIC PNL TOTAL CA: CPT | Performed by: INTERNAL MEDICINE

## 2022-11-30 PROCEDURE — 84132 ASSAY OF SERUM POTASSIUM: CPT | Performed by: HOSPITALIST

## 2022-11-30 PROCEDURE — 250N000012 HC RX MED GY IP 250 OP 636 PS 637: Performed by: INTERNAL MEDICINE

## 2022-11-30 PROCEDURE — 36415 COLL VENOUS BLD VENIPUNCTURE: CPT | Performed by: HOSPITALIST

## 2022-11-30 PROCEDURE — 120N000001 HC R&B MED SURG/OB

## 2022-11-30 PROCEDURE — 94640 AIRWAY INHALATION TREATMENT: CPT | Mod: 76

## 2022-11-30 PROCEDURE — 99233 SBSQ HOSP IP/OBS HIGH 50: CPT | Performed by: HOSPITALIST

## 2022-11-30 PROCEDURE — 250N000011 HC RX IP 250 OP 636: Performed by: INTERNAL MEDICINE

## 2022-11-30 RX ORDER — AMLODIPINE BESYLATE 5 MG/1
5 TABLET ORAL DAILY
Status: DISCONTINUED | OUTPATIENT
Start: 2022-11-30 | End: 2022-12-03

## 2022-11-30 RX ORDER — POTASSIUM CHLORIDE 1500 MG/1
20 TABLET, EXTENDED RELEASE ORAL ONCE
Status: COMPLETED | OUTPATIENT
Start: 2022-11-30 | End: 2022-11-30

## 2022-11-30 RX ORDER — BENZONATATE 100 MG/1
100 CAPSULE ORAL EVERY 8 HOURS
Status: DISPENSED | OUTPATIENT
Start: 2022-11-30 | End: 2022-12-03

## 2022-11-30 RX ORDER — LABETALOL HYDROCHLORIDE 5 MG/ML
10 INJECTION, SOLUTION INTRAVENOUS EVERY 4 HOURS PRN
Status: DISCONTINUED | OUTPATIENT
Start: 2022-11-30 | End: 2022-12-04 | Stop reason: HOSPADM

## 2022-11-30 RX ORDER — METHYLPREDNISOLONE SODIUM SUCCINATE 40 MG/ML
40 INJECTION, POWDER, LYOPHILIZED, FOR SOLUTION INTRAMUSCULAR; INTRAVENOUS EVERY 12 HOURS
Status: DISCONTINUED | OUTPATIENT
Start: 2022-11-30 | End: 2022-12-01

## 2022-11-30 RX ADMIN — HEPARIN SODIUM 5000 UNITS: 5000 INJECTION, SOLUTION INTRAVENOUS; SUBCUTANEOUS at 12:10

## 2022-11-30 RX ADMIN — BENZONATATE 100 MG: 100 CAPSULE ORAL at 12:10

## 2022-11-30 RX ADMIN — ALBUTEROL SULFATE 2.5 MG: 2.5 SOLUTION RESPIRATORY (INHALATION) at 08:35

## 2022-11-30 RX ADMIN — PREDNISONE 40 MG: 20 TABLET ORAL at 09:35

## 2022-11-30 RX ADMIN — METHYLPREDNISOLONE SODIUM SUCCINATE 40 MG: 40 INJECTION, POWDER, FOR SOLUTION INTRAMUSCULAR; INTRAVENOUS at 12:10

## 2022-11-30 RX ADMIN — GUAIFENESIN AND DEXTROMETHORPHAN HYDROBROMIDE 1 TABLET: 600; 30 TABLET, EXTENDED RELEASE ORAL at 01:13

## 2022-11-30 RX ADMIN — BENZONATATE 100 MG: 100 CAPSULE ORAL at 20:13

## 2022-11-30 RX ADMIN — POTASSIUM CHLORIDE 20 MEQ: 1500 TABLET, EXTENDED RELEASE ORAL at 15:06

## 2022-11-30 RX ADMIN — AMLODIPINE BESYLATE 5 MG: 5 TABLET ORAL at 15:05

## 2022-11-30 RX ADMIN — SENNOSIDES AND DOCUSATE SODIUM 1 TABLET: 50; 8.6 TABLET ORAL at 20:13

## 2022-11-30 RX ADMIN — METOPROLOL TARTRATE 100 MG: 100 TABLET, FILM COATED ORAL at 09:35

## 2022-11-30 RX ADMIN — ROSUVASTATIN CALCIUM 5 MG: 5 TABLET, FILM COATED ORAL at 22:37

## 2022-11-30 RX ADMIN — LEVOTHYROXINE SODIUM 75 MCG: 0.03 TABLET ORAL at 08:10

## 2022-11-30 RX ADMIN — INSULIN GLARGINE 35 UNITS: 100 INJECTION, SOLUTION SUBCUTANEOUS at 08:05

## 2022-11-30 RX ADMIN — METOPROLOL TARTRATE 100 MG: 100 TABLET, FILM COATED ORAL at 20:13

## 2022-11-30 RX ADMIN — ALBUTEROL SULFATE 2 PUFF: 90 AEROSOL, METERED RESPIRATORY (INHALATION) at 22:37

## 2022-11-30 RX ADMIN — HYDROCHLOROTHIAZIDE 12.5 MG: 12.5 CAPSULE ORAL at 09:34

## 2022-11-30 RX ADMIN — HEPARIN SODIUM 5000 UNITS: 5000 INJECTION, SOLUTION INTRAVENOUS; SUBCUTANEOUS at 01:01

## 2022-11-30 RX ADMIN — INSULIN ASPART 2 UNITS: 100 INJECTION, SOLUTION INTRAVENOUS; SUBCUTANEOUS at 17:08

## 2022-11-30 RX ADMIN — PANTOPRAZOLE SODIUM 40 MG: 40 TABLET, DELAYED RELEASE ORAL at 08:02

## 2022-11-30 RX ADMIN — INSULIN GLARGINE 35 UNITS: 100 INJECTION, SOLUTION SUBCUTANEOUS at 20:14

## 2022-11-30 RX ADMIN — ALBUTEROL SULFATE 2.5 MG: 2.5 SOLUTION RESPIRATORY (INHALATION) at 15:09

## 2022-11-30 RX ADMIN — IRBESARTAN 300 MG: 300 TABLET ORAL at 09:35

## 2022-11-30 RX ADMIN — METHYLPREDNISOLONE SODIUM SUCCINATE 40 MG: 40 INJECTION, POWDER, FOR SOLUTION INTRAMUSCULAR; INTRAVENOUS at 22:38

## 2022-11-30 RX ADMIN — ALBUTEROL SULFATE 2 PUFF: 90 AEROSOL, METERED RESPIRATORY (INHALATION) at 02:00

## 2022-11-30 ASSESSMENT — ACTIVITIES OF DAILY LIVING (ADL)
ADLS_ACUITY_SCORE: 18

## 2022-11-30 NOTE — PROGRESS NOTES
"[facility] Hospitalist Progress Note  [facility]  Admission date: 11/28/2022    Summary:    67F with history of asthma, obesity, HTN, diabetes, CKD who presents with shortness of breath and cough and found to have RSV and asthma exacerbation.    Slow to improve     Assessment/Plan    #RSV and asthma exacerbation - significant wheeze and paroxysms of cough.    -change prednisone to solumedrol to now, tessalon, bronchodilator nebs, supportive care    #BRAXTON on CKD3 - in setting of diuresis and resuming ARB.  Hold these for now.    #HTN - holding ARB and diuretics as above.  Continue lopressor.  Short term can use hydralazine    #DM2 -   -home regimen tresiba 48, novolog  -inpatient - lantus 35bid, 1:7CE, SSI    Checklist:  Code Status: Full Code    Diet: Combination Diet Regular Diet Adult; Moderate Consistent Carb (60 g CHO per Meal) Diet  Room Service    Rao Catheter: Not present  Central Lines: None  DVT px:  Heparin SQ        Auto-populated based on system request - if relevant they will be addressed above:  Clinically Significant Risk Factors        # Hypokalemia: Lowest K = 3.3 mmol/L (Ref range: 3.4-5.3) in last 2 days, will replace as needed  # Hyponatremia: Lowest Na = 134 mmol/L (Ref range: 136-145) in last 2 days, will monitor as appropriate      # Hypoalbuminemia: Lowest albumin = 3 g/dL (Ref range: 3.5-5.2) at 11/29/2022  6:40 AM, will monitor as appropriate          # Obesity: Estimated body mass index is 33.66 kg/m  as calculated from the following:    Height as of this encounter: 1.6 m (5' 3\").    Weight as of this encounter: 86.2 kg (190 lb)., PRESENT ON ADMISSION         Auto-populated from discharge tab:     Expected Discharge Date: 12/01/2022    Discharge Delays: IV Medication - consider oral or Home Infusion  Isolation - find facility that will accept    Discharge Comments: ISO for RSV         Overnight Events/Subjective/Notable results:  Ongoing BEAR and paroxysms of dry cough and " wheezing.  Feels wiped out.    4 point ROS otherwise negative    Objective    Vital signs in last 24 hours  Temp:  [97.8  F (36.6  C)-98.4  F (36.9  C)] 97.8  F (36.6  C)  Pulse:  [57-66] 57  Resp:  [18-20] 20  BP: (131-160)/(62-84) 160/84  SpO2:  [90 %-95 %] 95 % O2 Device: None (Room air)    Weight:   190 lbs 0 oz    Intake/Output last 3 shifts  No intake/output data recorded.  Body mass index is 33.66 kg/m .    Physical Exam  General:  Alert, cooperative, no distress    Neurologic:  oriented, facial symmetry preserved, fluent speech.   Psych: calm, mood and affect appropriate to situation  HEENT:  Anicteric, MMM  CV: RRR no MRG, normal S1 and S2, no edema  Lungs: diffuse wheezing especially after coughing.  No use of accessory muscles  Abd: soft, obese, NT, normoactive BS  Skin: no rashes or jaundice noted on exposed skin.    Central Lines and Tubes: None (no garrett, CVC, feeding tubes)      I have reviewed all labs, medications, imaging studies in the last 24 hours.  Pertinent findings&changes discussed above.    Data     Nery Medina MD, [facility]  Internal Medicine Hospitalist

## 2022-11-30 NOTE — PLAN OF CARE
Goal Outcome Evaluation:      Plan of Care Reviewed With: patient    Overall Patient Progress: improvingOverall Patient Progress: improving         Problem: Diabetes Comorbidity  Goal: Blood Glucose Level Within Targeted Range  Outcome: Progressing     Problem: Hypertension Comorbidity  Goal: Blood Pressure in Desired Range  Outcome: Progressing     Problem: Anxiety Signs/Symptoms  Goal: Optimized Energy Level (Anxiety Signs/Symptoms)  Outcome: Progressing     BG WNL. Mealtime coverage and Lantus. Declined lunch and states she will order an early dinner. BP elevated. Home meds given this AM and Dr Medina started Norvasc. Pt restarted back on IV Solumedrol. She is very concerned about being on it as she states last time it made her very anxious. Pt reassured that dose is lower and we will assess her ability to tolerate it. Up indep in room.

## 2022-11-30 NOTE — PLAN OF CARE
Problem: Diabetes Comorbidity  Goal: Blood Glucose Level Within Targeted Range  Outcome: Progressing     Problem: Hypertension Comorbidity  Goal: Blood Pressure in Desired Range  Outcome: Progressing  Intervention: Maintain Blood Pressure Management  Recent Flowsheet Documentation  Taken 11/29/2022 1700 by Sarah Alejandro RN  Medication Review/Management: medications reviewed   Goal Outcome Evaluation:    Patient AAO. Denies pain. SOB with exertion. PRN Nebulizer administered. Expiratory wheezes noted, pt reports feeling as if her chest is tight. Has non productive cough, PRN mucinex ordered. BG not requiring sliding scale insulin, CC administered at dinner time.

## 2022-11-30 NOTE — PROVIDER NOTIFICATION
Dr Medina text paged-  : Pt refusing Hydralalzine. /87. States she thinks that is the medication she took at [facility] when she had COVID that made her feel flush and elevated her BP. States she would prefer to just take more Metoprolol.

## 2022-11-30 NOTE — PLAN OF CARE
Problem: Anxiety Signs/Symptoms  Goal: Optimized Energy Level (Anxiety Signs/Symptoms)  Outcome: Not Progressing  Flowsheets (Taken 11/30/2022 0519)  Mutually Determined Action Steps (Optimized Energy Level): other (see comments)  Goal: Optimized Cognitive Function (Anxiety Signs/Symptoms)  Outcome: Not Progressing  Goal: Improved Mood Symptoms (Anxiety Signs/Symptoms)  Outcome: Not Progressing  Goal: Improved Sleep (Anxiety Signs/Symptoms)  Outcome: Not Progressing  Goal: Enhanced Social, Occupational or Functional Skills (Anxiety Signs/Symptoms)  Outcome: Not Progressing  Goal: Improved Somatic Symptoms (Anxiety Signs/Symptoms)  Outcome: Not Progressing   Goal Outcome Evaluation:    Patient AAO x 4. VSS. Patient was distressed, crying and complaining that she can't breathe. Saturated at 90% Congested cough and expiratory wheezes. Refused to use inhaler PRN. Mucinex given for cough. Reapproached with inhaler. Sat 94% after adminitstration.    Offered xanax PRN for anxiety, patient refused. Care explained. Reassurance provided.

## 2022-12-01 ENCOUNTER — APPOINTMENT (OUTPATIENT)
Dept: RADIOLOGY | Facility: HOSPITAL | Age: 67
DRG: 202 | End: 2022-12-01
Attending: INTERNAL MEDICINE
Payer: COMMERCIAL

## 2022-12-01 LAB
ANION GAP SERPL CALCULATED.3IONS-SCNC: 13 MMOL/L (ref 7–15)
BUN SERPL-MCNC: 57.5 MG/DL (ref 8–23)
CALCIUM SERPL-MCNC: 8.4 MG/DL (ref 8.8–10.2)
CHLORIDE SERPL-SCNC: 97 MMOL/L (ref 98–107)
CREAT SERPL-MCNC: 2.21 MG/DL (ref 0.51–0.95)
DEPRECATED HCO3 PLAS-SCNC: 25 MMOL/L (ref 22–29)
GFR SERPL CREATININE-BSD FRML MDRD: 23 ML/MIN/1.73M2
GLUCOSE BLDC GLUCOMTR-MCNC: 124 MG/DL (ref 70–99)
GLUCOSE BLDC GLUCOMTR-MCNC: 138 MG/DL (ref 70–99)
GLUCOSE BLDC GLUCOMTR-MCNC: 140 MG/DL (ref 70–99)
GLUCOSE BLDC GLUCOMTR-MCNC: 174 MG/DL (ref 70–99)
GLUCOSE SERPL-MCNC: 175 MG/DL (ref 70–99)
MAGNESIUM SERPL-MCNC: 2.1 MG/DL (ref 1.7–2.3)
PLATELET # BLD AUTO: 195 10E3/UL (ref 150–450)
POTASSIUM SERPL-SCNC: 4.2 MMOL/L (ref 3.4–5.3)
PROCALCITONIN SERPL IA-MCNC: 0.27 NG/ML
SODIUM SERPL-SCNC: 135 MMOL/L (ref 136–145)

## 2022-12-01 PROCEDURE — 99233 SBSQ HOSP IP/OBS HIGH 50: CPT | Performed by: HOSPITALIST

## 2022-12-01 PROCEDURE — 36415 COLL VENOUS BLD VENIPUNCTURE: CPT | Performed by: INTERNAL MEDICINE

## 2022-12-01 PROCEDURE — 71045 X-RAY EXAM CHEST 1 VIEW: CPT

## 2022-12-01 PROCEDURE — 120N000001 HC R&B MED SURG/OB

## 2022-12-01 PROCEDURE — 250N000013 HC RX MED GY IP 250 OP 250 PS 637: Performed by: INTERNAL MEDICINE

## 2022-12-01 PROCEDURE — 94640 AIRWAY INHALATION TREATMENT: CPT | Mod: 76

## 2022-12-01 PROCEDURE — 999N000157 HC STATISTIC RCP TIME EA 10 MIN

## 2022-12-01 PROCEDURE — 85049 AUTOMATED PLATELET COUNT: CPT | Performed by: INTERNAL MEDICINE

## 2022-12-01 PROCEDURE — 99221 1ST HOSP IP/OBS SF/LOW 40: CPT | Performed by: INTERNAL MEDICINE

## 2022-12-01 PROCEDURE — 250N000013 HC RX MED GY IP 250 OP 250 PS 637: Performed by: HOSPITALIST

## 2022-12-01 PROCEDURE — 83735 ASSAY OF MAGNESIUM: CPT | Performed by: INTERNAL MEDICINE

## 2022-12-01 PROCEDURE — 250N000011 HC RX IP 250 OP 636: Performed by: HOSPITALIST

## 2022-12-01 PROCEDURE — 82785 ASSAY OF IGE: CPT | Performed by: INTERNAL MEDICINE

## 2022-12-01 PROCEDURE — 250N000009 HC RX 250: Performed by: INTERNAL MEDICINE

## 2022-12-01 PROCEDURE — 82310 ASSAY OF CALCIUM: CPT | Performed by: HOSPITALIST

## 2022-12-01 PROCEDURE — 84145 PROCALCITONIN (PCT): CPT | Performed by: INTERNAL MEDICINE

## 2022-12-01 PROCEDURE — 250N000011 HC RX IP 250 OP 636: Performed by: INTERNAL MEDICINE

## 2022-12-01 RX ORDER — METHYLPREDNISOLONE SODIUM SUCCINATE 40 MG/ML
40 INJECTION, POWDER, LYOPHILIZED, FOR SOLUTION INTRAMUSCULAR; INTRAVENOUS EVERY 8 HOURS
Status: DISCONTINUED | OUTPATIENT
Start: 2022-12-01 | End: 2022-12-02

## 2022-12-01 RX ORDER — AZITHROMYCIN 250 MG/1
250 TABLET, FILM COATED ORAL DAILY
Status: DISCONTINUED | OUTPATIENT
Start: 2022-12-02 | End: 2022-12-04 | Stop reason: HOSPADM

## 2022-12-01 RX ORDER — MONTELUKAST SODIUM 10 MG/1
10 TABLET ORAL ONCE
Status: COMPLETED | OUTPATIENT
Start: 2022-12-01 | End: 2022-12-01

## 2022-12-01 RX ORDER — FLUTICASONE PROPIONATE 50 MCG
2 SPRAY, SUSPENSION (ML) NASAL DAILY
Status: DISCONTINUED | OUTPATIENT
Start: 2022-12-01 | End: 2022-12-04 | Stop reason: HOSPADM

## 2022-12-01 RX ORDER — AZITHROMYCIN 250 MG/1
500 TABLET, FILM COATED ORAL ONCE
Status: COMPLETED | OUTPATIENT
Start: 2022-12-01 | End: 2022-12-01

## 2022-12-01 RX ORDER — MONTELUKAST SODIUM 10 MG/1
10 TABLET ORAL AT BEDTIME
Status: DISCONTINUED | OUTPATIENT
Start: 2022-12-02 | End: 2022-12-04 | Stop reason: HOSPADM

## 2022-12-01 RX ORDER — ALBUTEROL SULFATE 0.83 MG/ML
2.5 SOLUTION RESPIRATORY (INHALATION) 2 TIMES DAILY
Status: DISCONTINUED | OUTPATIENT
Start: 2022-12-02 | End: 2022-12-04

## 2022-12-01 RX ADMIN — PANTOPRAZOLE SODIUM 40 MG: 40 TABLET, DELAYED RELEASE ORAL at 09:08

## 2022-12-01 RX ADMIN — FLUTICASONE PROPIONATE 2 SPRAY: 50 SPRAY, METERED NASAL at 16:35

## 2022-12-01 RX ADMIN — AMLODIPINE BESYLATE 5 MG: 5 TABLET ORAL at 09:13

## 2022-12-01 RX ADMIN — SENNOSIDES AND DOCUSATE SODIUM 1 TABLET: 50; 8.6 TABLET ORAL at 20:32

## 2022-12-01 RX ADMIN — ROSUVASTATIN CALCIUM 5 MG: 5 TABLET, FILM COATED ORAL at 22:06

## 2022-12-01 RX ADMIN — GUAIFENESIN AND DEXTROMETHORPHAN HYDROBROMIDE 1 TABLET: 600; 30 TABLET, EXTENDED RELEASE ORAL at 12:26

## 2022-12-01 RX ADMIN — MONTELUKAST 10 MG: 10 TABLET, FILM COATED ORAL at 16:35

## 2022-12-01 RX ADMIN — BENZONATATE 100 MG: 100 CAPSULE ORAL at 12:13

## 2022-12-01 RX ADMIN — LEVOTHYROXINE SODIUM 75 MCG: 0.03 TABLET ORAL at 09:07

## 2022-12-01 RX ADMIN — ALBUTEROL SULFATE 2.5 MG: 2.5 SOLUTION RESPIRATORY (INHALATION) at 21:25

## 2022-12-01 RX ADMIN — ALBUTEROL SULFATE 2.5 MG: 2.5 SOLUTION RESPIRATORY (INHALATION) at 09:21

## 2022-12-01 RX ADMIN — INSULIN GLARGINE 35 UNITS: 100 INJECTION, SOLUTION SUBCUTANEOUS at 20:55

## 2022-12-01 RX ADMIN — INSULIN ASPART 1 UNITS: 100 INJECTION, SOLUTION INTRAVENOUS; SUBCUTANEOUS at 13:20

## 2022-12-01 RX ADMIN — METHYLPREDNISOLONE SODIUM SUCCINATE 40 MG: 40 INJECTION, POWDER, FOR SOLUTION INTRAMUSCULAR; INTRAVENOUS at 20:56

## 2022-12-01 RX ADMIN — INSULIN ASPART 4 UNITS: 100 INJECTION, SOLUTION INTRAVENOUS; SUBCUTANEOUS at 08:38

## 2022-12-01 RX ADMIN — BENZONATATE 100 MG: 100 CAPSULE ORAL at 20:31

## 2022-12-01 RX ADMIN — METOPROLOL TARTRATE 100 MG: 100 TABLET, FILM COATED ORAL at 20:34

## 2022-12-01 RX ADMIN — HEPARIN SODIUM 5000 UNITS: 5000 INJECTION, SOLUTION INTRAVENOUS; SUBCUTANEOUS at 01:06

## 2022-12-01 RX ADMIN — HEPARIN SODIUM 5000 UNITS: 5000 INJECTION, SOLUTION INTRAVENOUS; SUBCUTANEOUS at 12:13

## 2022-12-01 RX ADMIN — METOPROLOL TARTRATE 100 MG: 100 TABLET, FILM COATED ORAL at 09:07

## 2022-12-01 RX ADMIN — INSULIN GLARGINE 35 UNITS: 100 INJECTION, SOLUTION SUBCUTANEOUS at 09:08

## 2022-12-01 RX ADMIN — AZITHROMYCIN MONOHYDRATE 500 MG: 250 TABLET ORAL at 18:57

## 2022-12-01 RX ADMIN — METHYLPREDNISOLONE SODIUM SUCCINATE 40 MG: 40 INJECTION, POWDER, FOR SOLUTION INTRAMUSCULAR; INTRAVENOUS at 12:13

## 2022-12-01 RX ADMIN — SENNOSIDES AND DOCUSATE SODIUM 1 TABLET: 50; 8.6 TABLET ORAL at 09:12

## 2022-12-01 ASSESSMENT — ACTIVITIES OF DAILY LIVING (ADL)
ADLS_ACUITY_SCORE: 18

## 2022-12-01 NOTE — PLAN OF CARE
Problem: Plan of Care - These are the overarching goals to be used throughout the patient stay.    Goal: Absence of Hospital-Acquired Illness or Injury  Outcome: Progressing  Intervention: Identify and Manage Fall Risk  Recent Flowsheet Documentation  Taken 12/1/2022 0924 by Orquidea Monge, RN  Safety Promotion/Fall Prevention:   room organization consistent   safety round/check completed  Goal: Optimal Comfort and Wellbeing  Outcome: Progressing     Problem: Diabetes Comorbidity  Goal: Blood Glucose Level Within Targeted Range  Outcome: Progressing     Problem: Hypertension Comorbidity  Goal: Blood Pressure in Desired Range  Intervention: Maintain Blood Pressure Management  Recent Flowsheet Documentation  Taken 12/1/2022 0924 by Orquidea Monge, RN  Medication Review/Management: medications reviewed   Goal Outcome Evaluation: Patient up in room independently, alert and oriented. Frequent congested cough and very congested nose. Denies pain. Continuing to monitor.

## 2022-12-01 NOTE — PROGRESS NOTES
Care Management Follow Up    Length of Stay (days): 3    Expected Discharge Date: 12/02/2022     Concerns to be Addressed:   RSV, respiratory status, IV solumedrol, nebs, Pulmonary consult, clinical improvement   Patient plan of care discussed at interdisciplinary rounds: Yes    Anticipated Discharge Disposition: Home     Anticipated Discharge Services:    Anticipated Discharge DME:      Patient/family educated on Medicare website which has current facility and service quality ratings:  NA  Education Provided on the Discharge Plan:  Per Care Team  Patient/Family in Agreement with the Plan: yes    Referrals Placed by CM/SW:  None at this time  Private pay costs discussed: Not applicable    Additional Information:  Chart reviewed.  Care Management is following care progression, reviewing recommendations from care team, and will assist with discharge planning, as needed.    Patient Is independent at baseline.  OT recommending home with assist and PT did not identify any acute PT needs.    Final discharge plan pending Pulmonary recommendations and patient clinical improvement.       Hiral Turner RN

## 2022-12-01 NOTE — PLAN OF CARE
Problem: Plan of Care - These are the overarching goals to be used throughout the patient stay.    Goal: Plan of Care Review  Description: The Plan of Care Review/Shift note should be completed every shift.  The Outcome Evaluation is a brief statement about your assessment that the patient is improving, declining, or no change.  This information will be displayed automatically on your shift note.  Outcome: Progressing  Flowsheets (Taken 11/30/2022 2145)  Plan of Care Reviewed With: patient  Overall Patient Progress: improving  Goal: Absence of Hospital-Acquired Illness or Injury  Intervention: Identify and Manage Fall Risk  Recent Flowsheet Documentation  Taken 11/30/2022 1540 by Lexy Rizzo RN  Safety Promotion/Fall Prevention:   clutter free environment maintained   fall prevention program maintained   mobility aid in reach   nonskid shoes/slippers when out of bed   patient and family education   room near nurse's station   room organization consistent   safety round/check completed   supervised activity     Problem: Diabetes Comorbidity  Goal: Blood Glucose Level Within Targeted Range  Outcome: Progressing     Problem: Hypertension Comorbidity  Goal: Blood Pressure in Desired Range  Outcome: Progressing  Intervention: Maintain Blood Pressure Management  Recent Flowsheet Documentation  Taken 11/30/2022 1540 by Lexy Rizzo RN  Medication Review/Management: medications reviewed   Goal Outcome Evaluation:      Plan of Care Reviewed With: patient    Overall Patient Progress: improvingOverall Patient Progress: improving     Pt alert & oriented x4. Denies chest pain, SOB, numbness/tingling. Pt is on RA. VSS. Pt able to make needs known. Call light within reach.

## 2022-12-01 NOTE — PLAN OF CARE
Problem: Plan of Care - These are the overarching goals to be used throughout the patient stay.    Goal: Optimal Comfort and Wellbeing  Outcome: Progressing     Problem: Hypertension Comorbidity  Goal: Blood Pressure in Desired Range  Outcome: Progressing     Problem: Anxiety Signs/Symptoms  Goal: Improved Sleep (Anxiety Signs/Symptoms)  Outcome: Progressing   Goal Outcome Evaluation:      Pt slept very well overnight,and requested not to be woken if not absolutely necessary. Denied pain and nausea. Up independently within room.

## 2022-12-01 NOTE — CONSULTS
"[facility] Pulmonary/Critical Care Consult Team Note    Mariama Keene,  1955, MRN [id number]  Admitting Dx: SOB (shortness of breath) [R06.02]  Elevated troponin [R77.8]  Elevated brain natriuretic peptide (BNP) level [R79.89]  RSV infection [B33.8]  Hypertension, unspecified type [I10]  Date / Time of Admission:  2022  6:21 AM    Recent Events:  Intake/Output last 3 shifts:  I/O last 3 completed shifts:  In: 1720 [P.O.:1720]  Out: -   She has not had to use her asthma meds (inhelers) all year and then when she had covid she started using her inhaler again.   She does report a lot of nasal congestion and dripping  She does not know of specific allergies  She says her breathing is improved since admission, but still wheezing and coughing     Assessment/Plan: Mariama Keene is a 67 year old female with PMHx of Obesity, CKD, DM, Asthma who presents with Acute Asthma exacerbation from RSV infection.    Asthma Exacerbation  - on Solumedrol 40q8  - nebs   - can start singulair  - will start flonase for nasal congestion  - checking IgE and Mag level  - checking procalcitonin, low threshold to give course of azithromycin    RSV Infection  - supportive care    She is kind-of interested in follow up with pulmonary clinic    Medical Care Time excluding procedures and family discussions greater than: 45 Minutes    Risk Factors Present on Admission:  Clinically Significant Risk Factors Present on Admission               # Obesity: Estimated body mass index is 33.66 kg/m  as calculated from the following:    Height as of this encounter: 1.6 m (5' 3\").    Weight as of this encounter: 86.2 kg (190 lb).          Adri Goode,   Pulmonary and Critical Care Attending  pgr [phone]    Allergies   Allergen Reactions     Alprazolam Other (See Comments)     Cephalexin Rash and Swelling     Exenatide Other (See Comments)     Hydralazine      Flush and Elevated BP      Iodinated Contrast Media [Diagnostic X-Ray " "Materials] Unknown     Lisinopril Other (See Comments)     Nitrofurantoin Monohyd/M-Cryst [Nitrofurantoin] Rash     Sulfa (Sulfonamide Antibiotics) [Sulfa Drugs] Other (See Comments)       Meds: See MAR    Physical Exam:  BP (!) 162/74 (BP Location: Right arm, Patient Position: Dangled, Cuff Size: Adult Large)   Pulse 65   Temp 98  F (36.7  C)   Resp 18   Ht 1.6 m (5' 3\")   Wt 86.2 kg (190 lb)   SpO2 92%   BMI 33.66 kg/m    Intake/Output this shift:  I/O this shift:  In: 360 [P.O.:360]  Out: -   GEN: sitting at the edge of the bed, NAD  HEENT: NC in place, MMM  CVS: regular rhythm, no murmurs  RESP: Diffuse wheezing, no rhonchi  ABD: Obese, Soft, No abdominal pain with palpation, no guarding, no rigidity  EXT: Warm, well perfused, no edema  NEURO:  Moving all extremities, nonfocal  PSYCH: pleasant    Pertinent Labs: Latest lab results in EHR personally reviewed.   CMP  Recent Labs   Lab 12/01/22  1312 12/01/22  0831 12/01/22  0658 11/30/22  2235 11/30/22  2227 11/30/22  1228 11/30/22  0959 11/29/22  0808 11/29/22  0640 11/28/22  1731 11/28/22  0713   NA  --   --  135*  --   --   --  136  --  134*  --  136   POTASSIUM  --   --  4.2  --  3.8  --  3.3*  --  4.0  --  3.9   CHLORIDE  --   --  97*  --   --   --  97*  --  95*  --  99   CO2  --   --  25  --   --   --  27  --  25  --  27   ANIONGAP  --   --  13  --   --   --  12  --  14  --  10   * 174* 175* 152*  --    < > 96   < > 355*   < > 243*   BUN  --   --  57.5*  --   --   --  55.1*  --  41.4*  --  38.3*   CR  --   --  2.21*  --   --   --  2.31*  --  1.85*  --  2.15*   GFRESTIMATED  --   --  23*  --   --   --  22*  --  29*  --  24*   BILL  --   --  8.4*  --   --   --  8.3*  --  8.4*  --  8.8   MAG  --   --  2.1  --   --   --   --   --   --   --  1.9   PROTTOTAL  --   --   --   --   --   --   --   --  5.8*  --  6.3*   ALBUMIN  --   --   --   --   --   --   --   --  3.0*  --  3.6   BILITOTAL  --   --   --   --   --   --   --   --  0.5  --  0.6   ALKPHOS  " --   --   --   --   --   --   --   --  110*  --  118*   AST  --   --   --   --   --   --   --   --  26  --  31   ALT  --   --   --   --   --   --   --   --  18  --  22    < > = values in this interval not displayed.     CBC  Recent Labs   Lab 22  0658 22  0640 22  0835   WBC  --  5.6 6.0   RBC  --  4.58 4.29   HGB  --  14.2 13.2   HCT  --  41.7 39.1   MCV  --  91 91   MCH  --  31.0 30.8   MCHC  --  34.1 33.8   RDW  --  12.5 12.7    157 146*     INR  Recent Labs   Lab 22  0920   INR 1.00     Arterial Blood GasNo lab results found in last 7 days.    Cultures: not yet available.    Imaging: personally reviewed.   Results for orders placed or performed during the hospital encounter of 22   XR Chest 1 View    Narrative    EXAM: XR CHEST 1 VIEW  LOCATION: [facility]  DATE/TIME: 2022 6:21 AM    INDICATION: Chest pain.  COMPARISON: Chest x-ray 2 views 2022 at 2153 hours.      Impression    IMPRESSION: Both lungs remain clear. No adenopathy or effusion. Normal cardiac size and pulmonary vascularity. Mild degenerative changes both shoulders and the spine. No significant interval change.   Echocardiogram Complete     Value    LVEF  55-60%    Narrative    [id number]  [id number]  [id number]  [id number]^JALEEL^KIERAN^[place] [address]     Name: LUIS MIGUEL GOODWIN  MRN: [id number]  : 1955  Study Date: 2022 01:38 PM  Age: 67 yrs  Gender: Female  Patient Location: [facility]  Reason For Study: Dyspnea  Ordering Physician: KIERAN MARMOLEJO  Referring Physician: KIERAN MARMOLEJO  Performed By: JACLYN     BSA: 1.9 m2  Height: 63 in  Weight: 190 lb  HR: 71  ______________________________________________________________________________  Procedure  Complete Echo Adult.  ______________________________________________________________________________  Interpretation Summary     Normal left  ventricular size. Mild to moderate left ventricular hypertrophy.  Left ventricular function appears normal. Left ventricular ejection fraction  estimated 55 to 60%.  Diastolic Doppler findings (E/E' ratio and/or other parameters) suggest left  ventricular filling pressures are increased.  Normal right ventricular size and systolic function.  Mild left atrial enlargement  No hemodynamically significant valve abnormality  Diastolic function appears abnormal.  ______________________________________________________________________________  I      WMSI = 1.00     % Normal = 100     X - Cannot   0 -                      (2) - Mildly 2 -          Segments  Size  Interpret    Hyperkinetic 1 - Normal  Hypokinetic  Hypokinetic  1-2     small                                                     7 -          3-5      moderate  3 - Akinetic 4 -          5 -         6 - Akinetic Dyskinetic   6-14    large               Dyskinetic   Aneurysmal  w/scar       w/scar       15-16   diffuse     Left Ventricle  The left ventricle is normal in size. There is mild to moderate concentric  left ventricular hypertrophy. Left ventricular systolic function is normal.  Diastolic Doppler findings (E/E' ratio and/or other parameters) suggest left  ventricular filling pressures are increased. The visual ejection fraction is  55-60%. Left ventricular diastolic function is abnormal. No regional wall  motion abnormalities noted.     Right Ventricle  Normal right ventricle size and systolic function. TAPSE is normal, which is  consistent with normal right ventricular systolic function.     Atria  The left atrium is mildly dilated. Right atrial size is normal.     Mitral Valve  There is moderate mitral annular calcification. There is mild (1+) mitral  regurgitation.     Tricuspid Valve  The tricuspid valve is not well visualized. There is trace tricuspid  regurgitation.     Aortic Valve  The aortic valve is trileaflet with aortic valve sclerosis. No  hemodynamically  significant valvular aortic stenosis.     Pulmonic Valve  The pulmonic valve is not well visualized. There is trace pulmonic valvular  regurgitation.     Vessels  The aorta root is normal. Normal size ascending aorta. IVC diameter <2.1 cm  collapsing >50% with sniff suggests a normal RA pressure of 3 mmHg.     Pericardium  There is no pericardial effusion.     ______________________________________________________________________________  MMode/2D Measurements & Calculations  IVSd: 1.3 cm  LVIDd: 4.4 cm  LVIDs: 3.0 cm  LVPWd: 1.3 cm  FS: 32.2 %  LV mass(C)d: 219.4 grams  LV mass(C)dI: 116.0 grams/m2  Ao root diam: 2.9 cm  LA dimension: 4.1 cm  asc Aorta Diam: 3.6 cm     LA/Ao: 1.4  LVOT diam: 1.9 cm  LVOT area: 2.7 cm2  LA Volume Indexed (AL/bp): 43.8 ml/m2  RWT: 0.60     Time Measurements  MM HR: 66.0 BPM     Doppler Measurements & Calculations  MV E max chepe: 132.0 cm/sec  MV A max chepe: 110.9 cm/sec  MV E/A: 1.2  MV max P.2 mmHg  MV mean PG: 3.2 mmHg  MV V2 VTI: 47.4 cm  MVA(VTI): 1.2 cm2     MV dec slope: 493.0 cm/sec2  MV dec time: 0.27 sec  Ao V2 max: 153.2 cm/sec  Ao max P.0 mmHg  Ao V2 mean: 116.1 cm/sec  Ao mean P.9 mmHg  Ao V2 VTI: 33.1 cm  ROBERT(I,D): 1.7 cm2  ROBERT(V,D): 1.6 cm2  LV V1 max PG: 3.5 mmHg  LV V1 max: 93.4 cm/sec  LV V1 VTI: 21.2 cm  SV(LVOT): 57.3 ml  SI(LVOT): 30.3 ml/m2  PA acc time: 0.18 sec  TR max chepe: 248.4 cm/sec  TR max P.7 mmHg  AV Chepe Ratio (DI): 0.61  ROBERT Index (cm2/m2): 0.91  E/E' av.8  Lateral E/e': 18.1  Medial E/e': 19.6     ______________________________________________________________________________  Report approved by: Siddharth Bain 2022 02:55 PM             Patient Active Problem List   Diagnosis     Insulin dependent type 2 diabetes mellitus (H)     Hyperlipidemia     Obesity     Essential hypertension, benign     Esophageal Reflux     Anxiety     Chronic fatigue syndrome     Hypothyroidism     Long term current use of insulin  (H)     Macromastia     Polyneuropathy due to type 2 diabetes mellitus (H)     Recurrent major depression in full remission (H)     Tobacco user     Vitamin D deficiency     Gastroesophageal reflux disease, esophagitis presence not specified     Obesity (BMI 30.0-34.9)     COVID-19     Acute kidney injury (H)     Viral pneumonia     SOB (shortness of breath)     Elevated troponin     Elevated brain natriuretic peptide (BNP) level     RSV infection     Hypertension, unspecified type         Surgical History  She  has a past surgical history that includes Oophorectomy; Hysterectomy; Abscess Drainage; and Nephrectomy partial (Right).    Family History  Reviewed, and family history includes Alzheimer Disease in her mother; Breast Cancer (age of onset: 45.00) in her maternal aunt; Chronic Obstructive Pulmonary Disease in her father; Hypertension in her mother.    Social History  Reviewed, and she  reports that she quit smoking about 30 years ago. She has never used smokeless tobacco. She reports current alcohol use. She reports that she does not use drugs.    Allergies  Allergies   Allergen Reactions     Alprazolam Other (See Comments)     Cephalexin Rash and Swelling     Exenatide Other (See Comments)     Hydralazine      Flush and Elevated BP      Iodinated Contrast Media [Diagnostic X-Ray Materials] Unknown     Lisinopril Other (See Comments)     Nitrofurantoin Monohyd/M-Cryst [Nitrofurantoin] Rash     Sulfa (Sulfonamide Antibiotics) [Sulfa Drugs] Other (See Comments)              Adri Goode,   Pulmonary and Critical Care Attending  [facility] [phone]    Securely message with the Vocera Web Console (learn more here)

## 2022-12-01 NOTE — PROGRESS NOTES
"[facility] Hospitalist Progress Note  [facility]  Admission date: 11/28/2022    Summary:    67F with history of asthma, obesity, HTN, diabetes, CKD who presents with shortness of breath and cough and found to have RSV and asthma exacerbation.      Slow to improve     Assessment/Plan    #RSV with severe asthma exacerbation - significant wheeze and paroxysms of cough.    -continue solumedrol and bronchodilator nebs  -tessalon for cough  -appreciate pulmonary input.  ?consideration trial of theophylline if not improving.    #BRAXTON on CKD3 - in setting of diuresis and resuming ARB.  Hold these for now.  Does have some trace ankle edema and would resume diuretic soon.    #HTN - holding ARB and diuretics as above.  Continue lopressor.  Started norvasc for now.      #DM2 -   -home regimen tresiba 48, novolog  -inpatient - lantus 35bid, 1:7CE, SSI    Checklist:  Code Status: Full Code    Diet: Combination Diet Regular Diet Adult; Moderate Consistent Carb (60 g CHO per Meal) Diet  Room Service    Rao Catheter: Not present  Central Lines: None  DVT px:  Heparin SQ        Auto-populated based on system request - if relevant they will be addressed above:  Clinically Significant Risk Factors        # Hypokalemia: Lowest K = 3.3 mmol/L (Ref range: 3.4-5.3) in last 2 days, will replace as needed  # Hyponatremia: Lowest Na = 135 mmol/L (Ref range: 136-145) in last 2 days, will monitor as appropriate      # Hypoalbuminemia: Lowest albumin = 3 g/dL (Ref range: 3.5-5.2) at 11/29/2022  6:40 AM, will monitor as appropriate          # Obesity: Estimated body mass index is 33.66 kg/m  as calculated from the following:    Height as of this encounter: 1.6 m (5' 3\").    Weight as of this encounter: 86.2 kg (190 lb)., PRESENT ON ADMISSION         Auto-populated from discharge tab:    Expected Discharge Date: 12/01/2022    Discharge Delays: IV Medication - consider oral or Home Infusion  Isolation - find facility that will accept  " "  Discharge Comments: ISO for RSV         Overnight Events/Subjective/Notable results:  Yesterfday afternoon was feeling better. This AM with recurrent coughing, fatigue, dyspnea with minimal exertion.  Cannot take a deep breath without cough.  Reports feeling like \"death warmed over\"    4 point ROS otherwise negative    Objective    Vital signs in last 24 hours  Temp:  [97.8  F (36.6  C)-98  F (36.7  C)] 98  F (36.7  C)  Pulse:  [55-69] 65  Resp:  [18] 18  BP: (136-174)/(67-87) 162/74  SpO2:  [90 %-92 %] 92 % O2 Device: None (Room air)    Weight:   190 lbs 0 oz    Intake/Output last 3 shifts  I/O last 3 completed shifts:  In: 1720 [P.O.:1720]  Out: -   Body mass index is 33.66 kg/m .    Physical Exam  General:  Alert, cooperative, no distress    Neurologic:  oriented, facial symmetry preserved, fluent speech.   Psych: calm, mood and affect appropriate to situation  HEENT:  Anicteric, MMM  CV: RRR no MRG, normal S1 and S2, no edema  Lungs: diffuse wheezing, paroxysms of cough.  Cannot take deep breath without cough.    Abd: soft, obese, NT, normoactive BS  Skin: no rashes or jaundice noted on exposed skin.    Central Lines and Tubes: None (no garrett, CVC, feeding tubes)      I have reviewed all labs, medications, imaging studies in the last 24 hours.  Pertinent findings&changes discussed above.    Data     Nery Medina MD, [facility]  Internal Medicine Hospitalist      "

## 2022-12-02 LAB
ANION GAP SERPL CALCULATED.3IONS-SCNC: 11 MMOL/L (ref 7–15)
BUN SERPL-MCNC: 59.9 MG/DL (ref 8–23)
CALCIUM SERPL-MCNC: 8.6 MG/DL (ref 8.8–10.2)
CHLORIDE SERPL-SCNC: 101 MMOL/L (ref 98–107)
CREAT SERPL-MCNC: 2.07 MG/DL (ref 0.51–0.95)
DEPRECATED HCO3 PLAS-SCNC: 26 MMOL/L (ref 22–29)
GFR SERPL CREATININE-BSD FRML MDRD: 25 ML/MIN/1.73M2
GLUCOSE BLDC GLUCOMTR-MCNC: 139 MG/DL (ref 70–99)
GLUCOSE BLDC GLUCOMTR-MCNC: 147 MG/DL (ref 70–99)
GLUCOSE BLDC GLUCOMTR-MCNC: 259 MG/DL (ref 70–99)
GLUCOSE BLDC GLUCOMTR-MCNC: 87 MG/DL (ref 70–99)
GLUCOSE SERPL-MCNC: 95 MG/DL (ref 70–99)
HOLD SPECIMEN: NORMAL
IGE SERPL-ACNC: 106 KU/L (ref 0–114)
POTASSIUM SERPL-SCNC: 3.8 MMOL/L (ref 3.4–5.3)
SODIUM SERPL-SCNC: 138 MMOL/L (ref 136–145)

## 2022-12-02 PROCEDURE — 99233 SBSQ HOSP IP/OBS HIGH 50: CPT | Performed by: INTERNAL MEDICINE

## 2022-12-02 PROCEDURE — 250N000009 HC RX 250: Performed by: HOSPITALIST

## 2022-12-02 PROCEDURE — 250N000013 HC RX MED GY IP 250 OP 250 PS 637: Performed by: HOSPITALIST

## 2022-12-02 PROCEDURE — 94640 AIRWAY INHALATION TREATMENT: CPT

## 2022-12-02 PROCEDURE — 250N000013 HC RX MED GY IP 250 OP 250 PS 637: Performed by: INTERNAL MEDICINE

## 2022-12-02 PROCEDURE — 999N000157 HC STATISTIC RCP TIME EA 10 MIN

## 2022-12-02 PROCEDURE — 80048 BASIC METABOLIC PNL TOTAL CA: CPT | Performed by: HOSPITALIST

## 2022-12-02 PROCEDURE — 94640 AIRWAY INHALATION TREATMENT: CPT | Mod: 76

## 2022-12-02 PROCEDURE — 250N000012 HC RX MED GY IP 250 OP 636 PS 637: Performed by: INTERNAL MEDICINE

## 2022-12-02 PROCEDURE — 250N000011 HC RX IP 250 OP 636: Performed by: HOSPITALIST

## 2022-12-02 PROCEDURE — 250N000011 HC RX IP 250 OP 636: Performed by: INTERNAL MEDICINE

## 2022-12-02 PROCEDURE — 120N000001 HC R&B MED SURG/OB

## 2022-12-02 PROCEDURE — 99232 SBSQ HOSP IP/OBS MODERATE 35: CPT | Performed by: INTERNAL MEDICINE

## 2022-12-02 PROCEDURE — 36415 COLL VENOUS BLD VENIPUNCTURE: CPT | Performed by: HOSPITALIST

## 2022-12-02 RX ORDER — LEVOTHYROXINE SODIUM 100 UG/1
100 TABLET ORAL DAILY
Status: DISCONTINUED | OUTPATIENT
Start: 2022-12-02 | End: 2022-12-04 | Stop reason: HOSPADM

## 2022-12-02 RX ADMIN — METOPROLOL TARTRATE 100 MG: 100 TABLET, FILM COATED ORAL at 09:46

## 2022-12-02 RX ADMIN — HEPARIN SODIUM 5000 UNITS: 5000 INJECTION, SOLUTION INTRAVENOUS; SUBCUTANEOUS at 00:24

## 2022-12-02 RX ADMIN — AZITHROMYCIN MONOHYDRATE 250 MG: 250 TABLET ORAL at 09:45

## 2022-12-02 RX ADMIN — METOPROLOL TARTRATE 100 MG: 100 TABLET, FILM COATED ORAL at 19:40

## 2022-12-02 RX ADMIN — LEVOTHYROXINE SODIUM 100 MCG: 100 TABLET ORAL at 08:26

## 2022-12-02 RX ADMIN — METHYLPREDNISOLONE SODIUM SUCCINATE 40 MG: 40 INJECTION, POWDER, FOR SOLUTION INTRAMUSCULAR; INTRAVENOUS at 12:57

## 2022-12-02 RX ADMIN — SENNOSIDES AND DOCUSATE SODIUM 1 TABLET: 50; 8.6 TABLET ORAL at 09:46

## 2022-12-02 RX ADMIN — ROSUVASTATIN CALCIUM 5 MG: 5 TABLET, FILM COATED ORAL at 21:19

## 2022-12-02 RX ADMIN — INSULIN ASPART 1 UNITS: 100 INJECTION, SOLUTION INTRAVENOUS; SUBCUTANEOUS at 13:02

## 2022-12-02 RX ADMIN — BENZONATATE 100 MG: 100 CAPSULE ORAL at 11:38

## 2022-12-02 RX ADMIN — ALBUTEROL SULFATE 2.5 MG: 2.5 SOLUTION RESPIRATORY (INHALATION) at 07:37

## 2022-12-02 RX ADMIN — ALBUTEROL SULFATE 2.5 MG: 2.5 SOLUTION RESPIRATORY (INHALATION) at 20:00

## 2022-12-02 RX ADMIN — METHYLPREDNISOLONE SODIUM SUCCINATE 40 MG: 40 INJECTION, POWDER, FOR SOLUTION INTRAMUSCULAR; INTRAVENOUS at 03:42

## 2022-12-02 RX ADMIN — PANTOPRAZOLE SODIUM 40 MG: 40 TABLET, DELAYED RELEASE ORAL at 09:46

## 2022-12-02 RX ADMIN — BENZONATATE 100 MG: 100 CAPSULE ORAL at 03:41

## 2022-12-02 RX ADMIN — PREDNISONE 30 MG: 20 TABLET ORAL at 17:37

## 2022-12-02 RX ADMIN — BENZONATATE 100 MG: 100 CAPSULE ORAL at 19:38

## 2022-12-02 RX ADMIN — HEPARIN SODIUM 5000 UNITS: 5000 INJECTION, SOLUTION INTRAVENOUS; SUBCUTANEOUS at 11:39

## 2022-12-02 RX ADMIN — INSULIN GLARGINE 35 UNITS: 100 INJECTION, SOLUTION SUBCUTANEOUS at 21:16

## 2022-12-02 RX ADMIN — SENNOSIDES AND DOCUSATE SODIUM 1 TABLET: 50; 8.6 TABLET ORAL at 19:40

## 2022-12-02 RX ADMIN — INSULIN GLARGINE 35 UNITS: 100 INJECTION, SOLUTION SUBCUTANEOUS at 08:24

## 2022-12-02 RX ADMIN — FLUTICASONE PROPIONATE 2 SPRAY: 50 SPRAY, METERED NASAL at 09:45

## 2022-12-02 RX ADMIN — AMLODIPINE BESYLATE 5 MG: 5 TABLET ORAL at 09:45

## 2022-12-02 RX ADMIN — MONTELUKAST 10 MG: 10 TABLET, FILM COATED ORAL at 21:19

## 2022-12-02 ASSESSMENT — ACTIVITIES OF DAILY LIVING (ADL)
ADLS_ACUITY_SCORE: 18

## 2022-12-02 NOTE — PROGRESS NOTES
Pt on RA. BS are coarse, expiratory wheeze. SpO2 96% HR 66. Pt given albuterol. Pt perceives improvement in breathing. BS increased aeration post tx. RT to continue to monitor.    Mary Lou Manuel, RT

## 2022-12-02 NOTE — PLAN OF CARE
Problem: Plan of Care - These are the overarching goals to be used throughout the patient stay.    Goal: Plan of Care Review  Description: The Plan of Care Review/Shift note should be completed every shift.  The Outcome Evaluation is a brief statement about your assessment that the patient is improving, declining, or no change.  This information will be displayed automatically on your shift note.  Outcome: Progressing   Goal Outcome Evaluation:         Discussed plan of care. Hoping to go home.  Problem: Plan of Care - These are the overarching goals to be used throughout the patient stay.    Goal: Absence of Hospital-Acquired Illness or Injury  Intervention: Identify and Manage Fall Risk  Recent Flowsheet Documentation  Taken 12/2/2022 0800 by Madhuri Zavala, RN  Safety Promotion/Fall Prevention: nonskid shoes/slippers when out of bed       Indep. In room. Fatigues easily, enc. To take her naps.

## 2022-12-02 NOTE — PROGRESS NOTES
[facility]    Medicine Progress Note - Hospitalist Service    Date of Admission:  11/28/2022    Assessment & Plan    67F with history of asthma, obesity, HTN, diabetes, CKD who presents with shortness of breath and cough and found to have RSV and asthma exacerbation.    RSV with severe asthma exacerbation - significant wheeze and paroxysms of cough.    -continue bronchodilator nebs  -Switch Solu-Medrol 40 every 8 to oral prednisone 30 mg twice daily  -Continue Singulair, Flonase  -tessalon for cough  -Pulmonology consulted, appreciate recommendations  -Starting azithromycin as Pro-Bradley is elevated     BRAXTON on CKD3   -Baseline creatinine approximately 1.8, in setting of diuresis and resuming ARB.  Continue to hold ARB  -Restarting hydrochlorothiazide and will monitor renal function     HTN   - holding ARB.  Restarting hydrochlorothiazide Continue lopressor.  Started norvasc for now and may need to titrate.    -Labetalol IV as needed for acute elevations     DM2 -   -home regimen tresiba 48, novolog  -inpatient - lantus 35bid, 1:7CE, sliding scale insulin    Hypothyroidism, acquired  -Levothyroxine 75 mcg at home, increasing to 100 mcg daily, will need recheck of TSH in 3 to 6 weeks  -11/28: TSH 7.05, free T4 0.77       Diet: Combination Diet Regular Diet Adult; Moderate Consistent Carb (60 g CHO per Meal) Diet  Room Service    DVT Prophylaxis: Heparin SQ  Rao Catheter: Not present  Central Lines: None  Cardiac Monitoring: None  Code Status: Full Code      Disposition Plan     Expected Discharge Date: 12/02/2022    Discharge Delays: IV Medication - consider oral or Home Infusion  Specialist Consult (enter specialist & decision needed in comments)  Destination: home  Discharge Comments: ISO for RSV  pulm consult  indep at baseline        The patient's care was discussed with the Patient.    Kadie Jones MD  Hospitalist Service  [facility]  Securely message with  "the [employer] Web Console (learn more here)  Text page via [employer] Paging/Directory         Clinically Significant Risk Factors        # Hypokalemia: Lowest K = 3.3 mmol/L (Ref range: 3.4-5.3) in last 2 days, will replace as needed  # Hyponatremia: Lowest Na = 135 mmol/L (Ref range: 136-145) in last 2 days, will monitor as appropriate      # Hypoalbuminemia: Lowest albumin = 3 g/dL (Ref range: 3.5-5.2) at 11/29/2022  6:40 AM, will monitor as appropriate          # Obesity: Estimated body mass index is 33.66 kg/m  as calculated from the following:    Height as of this encounter: 1.6 m (5' 3\").    Weight as of this encounter: 86.2 kg (190 lb)., PRESENT ON ADMISSION         ______________________________________________________________________    Interval History   Mrs. Keene is feeling significantly better today.  She was wanting to go home.  Her lung sounds are loud and diffuse with wheezing bilaterally but worse on the right.  She does have somewhat of an audible wheeze at times.  She was started on azithromycin by pulm.  She continues to have shortness of breath with movement but doing fine at baseline.    Data reviewed today: I reviewed all medications, new labs and imaging results over the last 24 hours. I personally reviewed no images or EKG's today.    Physical Exam   Vital Signs: Temp: 97.8  F (36.6  C) Temp src: Oral BP: (!) 150/72 Pulse: 61   Resp: 16 SpO2: 91 % O2 Device: None (Room air)    Weight: 190 lbs 0 oz  General Appearance: Awake, alert, in no acute distress  Respiratory: Bilateral wheeze somewhat worse on the right, occasionally audible with speaking  Cardiovascular: RRR, no murmur noted  GI: soft, nontender, non distended, normal bowel sounds  Skin: no jaundice, no rash      Data   Recent Labs   Lab 12/02/22  1704 12/02/22  1301 12/02/22  0804 12/02/22  0655 12/01/22  0831 12/01/22  0658 11/30/22  2235 11/30/22  2227 11/30/22  1228 11/30/22  0959 11/29/22  0808 11/29/22  0640 11/28/22  1731 " 11/28/22 0920 11/28/22  0835 11/28/22  0713   WBC  --   --   --   --   --   --   --   --   --   --   --  5.6  --   --  6.0  --    HGB  --   --   --   --   --   --   --   --   --   --   --  14.2  --   --  13.2  --    MCV  --   --   --   --   --   --   --   --   --   --   --  91  --   --  91  --    PLT  --   --   --   --   --  195  --   --   --   --   --  157  --   --  146*  --    INR  --   --   --   --   --   --   --   --   --   --   --   --   --  1.00  --   --    NA  --   --   --  138  --  135*  --   --   --  136  --  134*  --   --   --  136   POTASSIUM  --   --   --  3.8  --  4.2  --  3.8  --  3.3*  --  4.0  --   --   --  3.9   CHLORIDE  --   --   --  101  --  97*  --   --   --  97*  --  95*  --   --   --  99   CO2  --   --   --  26  --  25  --   --   --  27  --  25  --   --   --  27   BUN  --   --   --  59.9*  --  57.5*  --   --   --  55.1*  --  41.4*  --   --   --  38.3*   CR  --   --   --  2.07*  --  2.21*  --   --   --  2.31*  --  1.85*  --   --   --  2.15*   ANIONGAP  --   --   --  11  --  13  --   --   --  12  --  14  --   --   --  10   BILL  --   --   --  8.6*  --  8.4*  --   --   --  8.3*  --  8.4*  --   --   --  8.8   * 147* 87 95   < > 175*   < >  --    < > 96   < > 355*   < >  --   --  243*   ALBUMIN  --   --   --   --   --   --   --   --   --   --   --  3.0*  --   --   --  3.6   PROTTOTAL  --   --   --   --   --   --   --   --   --   --   --  5.8*  --   --   --  6.3*   BILITOTAL  --   --   --   --   --   --   --   --   --   --   --  0.5  --   --   --  0.6   ALKPHOS  --   --   --   --   --   --   --   --   --   --   --  110*  --   --   --  118*   ALT  --   --   --   --   --   --   --   --   --   --   --  18  --   --   --  22   AST  --   --   --   --   --   --   --   --   --   --   --  26  --   --   --  31    < > = values in this interval not displayed.   Medications       albuterol  2.5 mg Nebulization BID     amLODIPine  5 mg Oral Daily     azithromycin  250 mg Oral Daily     benzonatate  100 mg  Oral Q8H     fluticasone  2 spray Both Nostrils Daily     heparin ANTICOAGULANT  5,000 Units Subcutaneous Q12H     hydrochlorothiazide  12.5 mg Oral Daily     insulin aspart  1-22 Units Subcutaneous TID AC     insulin aspart  1-16 Units Subcutaneous At Bedtime     insulin aspart   Subcutaneous TID w/meals     insulin glargine  35 Units Subcutaneous BID     [Held by provider] irbesartan  300 mg Oral Daily     levothyroxine  100 mcg Oral Daily     metoprolol tartrate  100 mg Oral BID     montelukast  10 mg Oral At Bedtime     pantoprazole  40 mg Oral QAM AC     predniSONE  30 mg Oral BID w/meals     [Held by provider] predniSONE  40 mg Oral Daily     rosuvastatin  5 mg Oral At Bedtime     senna-docusate  1 tablet Oral BID    Or     senna-docusate  2 tablet Oral BID     sodium chloride (PF)  3 mL Intracatheter Q8H

## 2022-12-02 NOTE — PROGRESS NOTES
[facility] Pulmonary/Critical Care Consult Team Note    Mariama Keene,  1955, MRN [id number]  Admitting Dx: SOB (shortness of breath) [R06.02]  Elevated troponin [R77.8]  Elevated brain natriuretic peptide (BNP) level [R79.89]  RSV infection [B33.8]  Hypertension, unspecified type [I10]  Date / Time of Admission:  2022  6:21 AM    Recent Events:  Intake/Output last 3 shifts:  I/O last 3 completed shifts:  In: 723 [P.O.:720; I.V.:3]  Out: -   She is feeling a little better from a nasal drainage perspective  No wheezing at rest, only when she coughs  She still has some dyspnea with exertion    Assessment/Plan: Mariama Keene is a 67 year old female with PMHx of Obesity, CKD, DM, Asthma who presents with Acute Asthma exacerbation from RSV infection.    Asthma Exacerbation  - on Solumedrol 40q8, will transition to prednisone 30mg bid  - nebs   - continue singulair  - continue flonase for nasal congestion  - checking procalcitonin, low threshold to give course of azithromycin    RSV Infection  - supportive care    She is kind-of interested in follow up with pulmonary clinic    Medical Care Time excluding procedures and family discussions greater than: 45 Minutes    Risk Factors Present on Admission:  Clinically Significant Risk Factors Present on Admission                         Adri Goode DO  Pulmonary and Critical Care Attending  pgr [phone]    Allergies   Allergen Reactions     Alprazolam Other (See Comments)     Cephalexin Rash and Swelling     Exenatide Other (See Comments)     Hydralazine      Flush and Elevated BP      Iodinated Contrast Media [Diagnostic X-Ray Materials] Unknown     Lisinopril Other (See Comments)     Nitrofurantoin Monohyd/M-Cryst [Nitrofurantoin] Rash     Sulfa (Sulfonamide Antibiotics) [Sulfa Drugs] Other (See Comments)       Meds: See MAR    Physical Exam:  /70 (BP Location: Right arm, Patient Position: Dangled, Cuff Size: Adult Regular)   Pulse 71   Temp  "97.8  F (36.6  C) (Oral)   Resp 18   Ht 1.6 m (5' 3\")   Wt 86.2 kg (190 lb)   SpO2 92%   BMI 33.66 kg/m    Intake/Output this shift:  No intake/output data recorded.  GEN: sitting at the edge of the bed, NAD  HEENT: NC in place, MMM  CVS: regular rhythm, no murmurs  RESP: Diffuse wheezing, no rhonchi  ABD: Obese, Soft, No abdominal pain with palpation, no guarding, no rigidity  EXT: Warm, well perfused, no edema  NEURO:  Moving all extremities, nonfocal  PSYCH: pleasant    Pertinent Labs: Latest lab results in EHR personally reviewed.   CMP  Recent Labs   Lab 12/02/22  1301 12/02/22  0804 12/02/22  0655 12/01/22  2109 12/01/22  0831 12/01/22  0658 11/30/22  2235 11/30/22  2227 11/30/22  1228 11/30/22  0959 11/29/22  0808 11/29/22  0640 11/28/22  1731 11/28/22  0713   NA  --   --  138  --   --  135*  --   --   --  136  --  134*  --  136   POTASSIUM  --   --  3.8  --   --  4.2  --  3.8  --  3.3*  --  4.0  --  3.9   CHLORIDE  --   --  101  --   --  97*  --   --   --  97*  --  95*  --  99   CO2  --   --  26  --   --  25  --   --   --  27  --  25  --  27   ANIONGAP  --   --  11  --   --  13  --   --   --  12  --  14  --  10   * 87 95 138*   < > 175*   < >  --    < > 96   < > 355*   < > 243*   BUN  --   --  59.9*  --   --  57.5*  --   --   --  55.1*  --  41.4*  --  38.3*   CR  --   --  2.07*  --   --  2.21*  --   --   --  2.31*  --  1.85*  --  2.15*   GFRESTIMATED  --   --  25*  --   --  23*  --   --   --  22*  --  29*  --  24*   BILL  --   --  8.6*  --   --  8.4*  --   --   --  8.3*  --  8.4*  --  8.8   MAG  --   --   --   --   --  2.1  --   --   --   --   --   --   --  1.9   PROTTOTAL  --   --   --   --   --   --   --   --   --   --   --  5.8*  --  6.3*   ALBUMIN  --   --   --   --   --   --   --   --   --   --   --  3.0*  --  3.6   BILITOTAL  --   --   --   --   --   --   --   --   --   --   --  0.5  --  0.6   ALKPHOS  --   --   --   --   --   --   --   --   --   --   --  110*  --  118*   AST  --   --   --   " --   --   --   --   --   --   --   --  26  --  31   ALT  --   --   --   --   --   --   --   --   --   --   --  18  --  22    < > = values in this interval not displayed.     CBC  Recent Labs   Lab 22  0658 22  0640 22  0835   WBC  --  5.6 6.0   RBC  --  4.58 4.29   HGB  --  14.2 13.2   HCT  --  41.7 39.1   MCV  --  91 91   MCH  --  31.0 30.8   MCHC  --  34.1 33.8   RDW  --  12.5 12.7    157 146*     INR  Recent Labs   Lab 22  0920   INR 1.00     Arterial Blood GasNo lab results found in last 7 days.    Cultures: not yet available.    Imaging: personally reviewed.   Results for orders placed or performed during the hospital encounter of 22   XR Chest 1 View    Narrative    EXAM: XR CHEST 1 VIEW  LOCATION: [facility]  DATE/TIME: 2022 6:21 AM    INDICATION: Chest pain.  COMPARISON: Chest x-ray 2 views 2022 at 2153 hours.      Impression    IMPRESSION: Both lungs remain clear. No adenopathy or effusion. Normal cardiac size and pulmonary vascularity. Mild degenerative changes both shoulders and the spine. No significant interval change.   Echocardiogram Complete     Value    LVEF  55-60%    Narrative    [id number]  [id number]  [id number]  [id number]^JALEEL^KIERAN^[place] [address]     Name: LUIS MIGUEL GOODWIN  MRN: [id number]  : 1955  Study Date: 2022 01:38 PM  Age: 67 yrs  Gender: Female  Patient Location: [facility]  Reason For Study: Dyspnea  Ordering Physician: KIERAN MARMOLEJO  Referring Physician: KIERAN MARMOLEJO  Performed By: JACLYN     BSA: 1.9 m2  Height: 63 in  Weight: 190 lb  HR: 71  ______________________________________________________________________________  Procedure  Complete Echo Adult.  ______________________________________________________________________________  Interpretation Summary     Normal left ventricular size. Mild to moderate left ventricular  hypertrophy.  Left ventricular function appears normal. Left ventricular ejection fraction  estimated 55 to 60%.  Diastolic Doppler findings (E/E' ratio and/or other parameters) suggest left  ventricular filling pressures are increased.  Normal right ventricular size and systolic function.  Mild left atrial enlargement  No hemodynamically significant valve abnormality  Diastolic function appears abnormal.  ______________________________________________________________________________  I      WMSI = 1.00     % Normal = 100     X - Cannot   0 -                      (2) - Mildly 2 -          Segments  Size  Interpret    Hyperkinetic 1 - Normal  Hypokinetic  Hypokinetic  1-2     small                                                     7 -          3-5      moderate  3 - Akinetic 4 -          5 -         6 - Akinetic Dyskinetic   6-14    large               Dyskinetic   Aneurysmal  w/scar       w/scar       15-16   diffuse     Left Ventricle  The left ventricle is normal in size. There is mild to moderate concentric  left ventricular hypertrophy. Left ventricular systolic function is normal.  Diastolic Doppler findings (E/E' ratio and/or other parameters) suggest left  ventricular filling pressures are increased. The visual ejection fraction is  55-60%. Left ventricular diastolic function is abnormal. No regional wall  motion abnormalities noted.     Right Ventricle  Normal right ventricle size and systolic function. TAPSE is normal, which is  consistent with normal right ventricular systolic function.     Atria  The left atrium is mildly dilated. Right atrial size is normal.     Mitral Valve  There is moderate mitral annular calcification. There is mild (1+) mitral  regurgitation.     Tricuspid Valve  The tricuspid valve is not well visualized. There is trace tricuspid  regurgitation.     Aortic Valve  The aortic valve is trileaflet with aortic valve sclerosis. No hemodynamically  significant valvular aortic  stenosis.     Pulmonic Valve  The pulmonic valve is not well visualized. There is trace pulmonic valvular  regurgitation.     Vessels  The aorta root is normal. Normal size ascending aorta. IVC diameter <2.1 cm  collapsing >50% with sniff suggests a normal RA pressure of 3 mmHg.     Pericardium  There is no pericardial effusion.     ______________________________________________________________________________  MMode/2D Measurements & Calculations  IVSd: 1.3 cm  LVIDd: 4.4 cm  LVIDs: 3.0 cm  LVPWd: 1.3 cm  FS: 32.2 %  LV mass(C)d: 219.4 grams  LV mass(C)dI: 116.0 grams/m2  Ao root diam: 2.9 cm  LA dimension: 4.1 cm  asc Aorta Diam: 3.6 cm     LA/Ao: 1.4  LVOT diam: 1.9 cm  LVOT area: 2.7 cm2  LA Volume Indexed (AL/bp): 43.8 ml/m2  RWT: 0.60     Time Measurements  MM HR: 66.0 BPM     Doppler Measurements & Calculations  MV E max chepe: 132.0 cm/sec  MV A max chepe: 110.9 cm/sec  MV E/A: 1.2  MV max P.2 mmHg  MV mean PG: 3.2 mmHg  MV V2 VTI: 47.4 cm  MVA(VTI): 1.2 cm2     MV dec slope: 493.0 cm/sec2  MV dec time: 0.27 sec  Ao V2 max: 153.2 cm/sec  Ao max P.0 mmHg  Ao V2 mean: 116.1 cm/sec  Ao mean P.9 mmHg  Ao V2 VTI: 33.1 cm  ROBERT(I,D): 1.7 cm2  ROBERT(V,D): 1.6 cm2  LV V1 max PG: 3.5 mmHg  LV V1 max: 93.4 cm/sec  LV V1 VTI: 21.2 cm  SV(LVOT): 57.3 ml  SI(LVOT): 30.3 ml/m2  PA acc time: 0.18 sec  TR max chepe: 248.4 cm/sec  TR max P.7 mmHg  AV Chepe Ratio (DI): 0.61  ROBERT Index (cm2/m2): 0.91  E/E' av.8  Lateral E/e': 18.1  Medial E/e': 19.6     ______________________________________________________________________________  Report approved by: Siddharth Bain 2022 02:55 PM             Patient Active Problem List   Diagnosis     Insulin dependent type 2 diabetes mellitus (H)     Hyperlipidemia     Obesity     Essential hypertension, benign     Esophageal Reflux     Anxiety     Chronic fatigue syndrome     Hypothyroidism     Long term current use of insulin (H)     Macromastia     Polyneuropathy due  to type 2 diabetes mellitus (H)     Recurrent major depression in full remission (H)     Tobacco user     Vitamin D deficiency     Gastroesophageal reflux disease, esophagitis presence not specified     Obesity (BMI 30.0-34.9)     COVID-19     Acute kidney injury (H)     Viral pneumonia     SOB (shortness of breath)     Elevated troponin     Elevated brain natriuretic peptide (BNP) level     RSV infection     Hypertension, unspecified type       Adri Goode,   Pulmonary and Critical Care Attending  pgr [phone]    Securely message with the Vocera Web Console (learn more here)

## 2022-12-02 NOTE — PLAN OF CARE
Problem: Plan of Care - These are the overarching goals to be used throughout the patient stay.    Goal: Optimal Comfort and Wellbeing  Outcome: Progressing     Problem: Hypertension Comorbidity  Goal: Blood Pressure in Desired Range  Outcome: Progressing     Problem: Anxiety Signs/Symptoms  Goal: Improved Sleep (Anxiety Signs/Symptoms)  Outcome: Progressing   Goal Outcome Evaluation:      Uneventful shift. Pt slept all night, only awake briefly for meds. She states she has been coughing less. Denied pain, nausea. Assist of 1 with incontinent episodes.Possible discharge today.

## 2022-12-02 NOTE — PROGRESS NOTES
RCAT Treatment Plan    Patient Score: 6  Patient Acuity: 4    Clinical Indication for Therapy: rhonchi on auscultation    Therapy Ordered: Albuterol BID    Assessment Summary: Patient believes the treatments are helping her lungs. Has been requesting PRN, will schedule BID for duration of stay. Patient does have home inhaler that she uses as needed. RT to follow.     Ainsley Garcia, RT  12/1/2022

## 2022-12-02 NOTE — PLAN OF CARE
Problem: Hypertension Comorbidity  Goal: Blood Pressure in Desired Range  Outcome: Progressing  Intervention: Maintain Blood Pressure Management  Recent Flowsheet Documentation  Taken 12/1/2022 1633 by Vianca Gutierrez RN  Medication Review/Management: medications reviewed   Goal Outcome Evaluation:         Pt AXO able to make her needs known. Pt denied any pain. Pt  was coughing less often. Pt said the Tessalon was very helpful.BG were 124 and 138.

## 2022-12-03 LAB
ANION GAP SERPL CALCULATED.3IONS-SCNC: 9 MMOL/L (ref 7–15)
BUN SERPL-MCNC: 59.7 MG/DL (ref 8–23)
CALCIUM SERPL-MCNC: 8.3 MG/DL (ref 8.8–10.2)
CHLORIDE SERPL-SCNC: 104 MMOL/L (ref 98–107)
CREAT SERPL-MCNC: 2.08 MG/DL (ref 0.51–0.95)
DEPRECATED HCO3 PLAS-SCNC: 26 MMOL/L (ref 22–29)
GFR SERPL CREATININE-BSD FRML MDRD: 25 ML/MIN/1.73M2
GLUCOSE BLDC GLUCOMTR-MCNC: 154 MG/DL (ref 70–99)
GLUCOSE BLDC GLUCOMTR-MCNC: 156 MG/DL (ref 70–99)
GLUCOSE BLDC GLUCOMTR-MCNC: 205 MG/DL (ref 70–99)
GLUCOSE BLDC GLUCOMTR-MCNC: 310 MG/DL (ref 70–99)
GLUCOSE SERPL-MCNC: 167 MG/DL (ref 70–99)
HOLD SPECIMEN: NORMAL
POTASSIUM SERPL-SCNC: 3.9 MMOL/L (ref 3.4–5.3)
SODIUM SERPL-SCNC: 139 MMOL/L (ref 136–145)

## 2022-12-03 PROCEDURE — 80048 BASIC METABOLIC PNL TOTAL CA: CPT | Performed by: HOSPITALIST

## 2022-12-03 PROCEDURE — 94640 AIRWAY INHALATION TREATMENT: CPT

## 2022-12-03 PROCEDURE — 250N000012 HC RX MED GY IP 250 OP 636 PS 637: Performed by: INTERNAL MEDICINE

## 2022-12-03 PROCEDURE — 36415 COLL VENOUS BLD VENIPUNCTURE: CPT | Performed by: HOSPITALIST

## 2022-12-03 PROCEDURE — 250N000009 HC RX 250: Performed by: HOSPITALIST

## 2022-12-03 PROCEDURE — 99233 SBSQ HOSP IP/OBS HIGH 50: CPT | Performed by: INTERNAL MEDICINE

## 2022-12-03 PROCEDURE — 250N000013 HC RX MED GY IP 250 OP 250 PS 637: Performed by: HOSPITALIST

## 2022-12-03 PROCEDURE — 250N000013 HC RX MED GY IP 250 OP 250 PS 637: Performed by: INTERNAL MEDICINE

## 2022-12-03 PROCEDURE — 120N000001 HC R&B MED SURG/OB

## 2022-12-03 PROCEDURE — 250N000011 HC RX IP 250 OP 636: Performed by: INTERNAL MEDICINE

## 2022-12-03 PROCEDURE — 99232 SBSQ HOSP IP/OBS MODERATE 35: CPT | Performed by: INTERNAL MEDICINE

## 2022-12-03 RX ORDER — AMLODIPINE BESYLATE 5 MG/1
10 TABLET ORAL DAILY
Status: DISCONTINUED | OUTPATIENT
Start: 2022-12-04 | End: 2022-12-04 | Stop reason: HOSPADM

## 2022-12-03 RX ORDER — AMLODIPINE BESYLATE 5 MG/1
5 TABLET ORAL ONCE
Status: COMPLETED | OUTPATIENT
Start: 2022-12-03 | End: 2022-12-03

## 2022-12-03 RX ADMIN — METOPROLOL TARTRATE 100 MG: 100 TABLET, FILM COATED ORAL at 08:39

## 2022-12-03 RX ADMIN — SENNOSIDES AND DOCUSATE SODIUM 1 TABLET: 50; 8.6 TABLET ORAL at 08:39

## 2022-12-03 RX ADMIN — INSULIN GLARGINE 35 UNITS: 100 INJECTION, SOLUTION SUBCUTANEOUS at 20:21

## 2022-12-03 RX ADMIN — ROSUVASTATIN CALCIUM 5 MG: 5 TABLET, FILM COATED ORAL at 21:47

## 2022-12-03 RX ADMIN — ALBUTEROL SULFATE 2.5 MG: 2.5 SOLUTION RESPIRATORY (INHALATION) at 20:11

## 2022-12-03 RX ADMIN — INSULIN ASPART 2 UNITS: 100 INJECTION, SOLUTION INTRAVENOUS; SUBCUTANEOUS at 08:41

## 2022-12-03 RX ADMIN — INSULIN ASPART 2 UNITS: 100 INJECTION, SOLUTION INTRAVENOUS; SUBCUTANEOUS at 13:00

## 2022-12-03 RX ADMIN — SENNOSIDES AND DOCUSATE SODIUM 2 TABLET: 50; 8.6 TABLET ORAL at 20:20

## 2022-12-03 RX ADMIN — PREDNISONE 30 MG: 20 TABLET ORAL at 16:30

## 2022-12-03 RX ADMIN — INSULIN ASPART 7 UNITS: 100 INJECTION, SOLUTION INTRAVENOUS; SUBCUTANEOUS at 17:44

## 2022-12-03 RX ADMIN — LEVOTHYROXINE SODIUM 100 MCG: 100 TABLET ORAL at 08:39

## 2022-12-03 RX ADMIN — BENZONATATE 100 MG: 100 CAPSULE ORAL at 06:21

## 2022-12-03 RX ADMIN — HEPARIN SODIUM 5000 UNITS: 5000 INJECTION, SOLUTION INTRAVENOUS; SUBCUTANEOUS at 06:23

## 2022-12-03 RX ADMIN — MONTELUKAST 10 MG: 10 TABLET, FILM COATED ORAL at 20:20

## 2022-12-03 RX ADMIN — INSULIN GLARGINE 35 UNITS: 100 INJECTION, SOLUTION SUBCUTANEOUS at 08:43

## 2022-12-03 RX ADMIN — PANTOPRAZOLE SODIUM 40 MG: 40 TABLET, DELAYED RELEASE ORAL at 06:21

## 2022-12-03 RX ADMIN — METOPROLOL TARTRATE 100 MG: 100 TABLET, FILM COATED ORAL at 20:20

## 2022-12-03 RX ADMIN — HYDROCHLOROTHIAZIDE 12.5 MG: 12.5 CAPSULE ORAL at 08:42

## 2022-12-03 RX ADMIN — FLUTICASONE PROPIONATE 2 SPRAY: 50 SPRAY, METERED NASAL at 08:38

## 2022-12-03 RX ADMIN — ALBUTEROL SULFATE 2 PUFF: 90 AEROSOL, METERED RESPIRATORY (INHALATION) at 08:40

## 2022-12-03 RX ADMIN — HEPARIN SODIUM 5000 UNITS: 5000 INJECTION, SOLUTION INTRAVENOUS; SUBCUTANEOUS at 17:45

## 2022-12-03 RX ADMIN — PREDNISONE 30 MG: 20 TABLET ORAL at 08:40

## 2022-12-03 RX ADMIN — AMLODIPINE BESYLATE 5 MG: 5 TABLET ORAL at 16:30

## 2022-12-03 RX ADMIN — AZITHROMYCIN MONOHYDRATE 250 MG: 250 TABLET ORAL at 08:39

## 2022-12-03 RX ADMIN — AMLODIPINE BESYLATE 5 MG: 5 TABLET ORAL at 08:40

## 2022-12-03 ASSESSMENT — ACTIVITIES OF DAILY LIVING (ADL)
ADLS_ACUITY_SCORE: 18

## 2022-12-03 NOTE — PROGRESS NOTES
"[facility] Pulmonary/Critical Care Consult Team Note    Mariama Keene,  1955, MRN [id number]  Admitting Dx: SOB (shortness of breath) [R06.02]  Elevated troponin [R77.8]  Elevated brain natriuretic peptide (BNP) level [R79.89]  RSV infection [B33.8]  Hypertension, unspecified type [I10]  Date / Time of Admission:  2022  6:21 AM    Recent Events:  Intake/Output last 3 shifts:  I/O last 3 completed shifts:  In: 1643 [P.O.:1640; I.V.:3]  Out: -   She is still having wheezing with coughing  Learning how to use inhaler with spacer    Assessment/Plan: Mariama Keene is a 67 year old female with PMHx of Obesity, CKD, DM, Asthma who presents with Acute Asthma exacerbation from RSV infection.    Asthma Exacerbation  - was on Solumedrol 40q8, transitioned to prednisone 30mg bid   - nebs   - continue singulair  - continue flonase for nasal congestion    RSV Infection  - supportive care    She is kind-of interested in follow up with pulmonary clinic    Medical Care Time excluding procedures and family discussions greater than: 45 Minutes    Risk Factors Present on Admission:  Clinically Significant Risk Factors Present on Admission                         Adri Goode DO  Pulmonary and Critical Care Attending  pgr [phone]    Allergies   Allergen Reactions     Alprazolam Other (See Comments)     Cephalexin Rash and Swelling     Exenatide Other (See Comments)     Hydralazine      Flush and Elevated BP      Iodinated Contrast Media [Diagnostic X-Ray Materials] Unknown     Lisinopril Other (See Comments)     Nitrofurantoin Monohyd/M-Cryst [Nitrofurantoin] Rash     Sulfa (Sulfonamide Antibiotics) [Sulfa Drugs] Other (See Comments)       Meds: See MAR    Physical Exam:  BP (!) 162/76   Pulse 63   Temp 97.8  F (36.6  C) (Oral)   Resp 20   Ht 1.6 m (5' 3\")   Wt 86.2 kg (190 lb)   SpO2 93%   BMI 33.66 kg/m    Intake/Output this shift:  No intake/output data recorded.  GEN: sitting up in bed, NAD  HEENT: " NC in place, MMM  CVS: regular rhythm, no murmurs  RESP: Diffuse wheezing, no rhonchi  ABD: Obese, Soft, No abdominal pain with palpation, no guarding, no rigidity  EXT: Warm, well perfused, no edema  NEURO:  Moving all extremities, nonfocal  PSYCH: pleasant    Pertinent Labs: Latest lab results in EHR personally reviewed.   CMP  Recent Labs   Lab 12/03/22  1245 12/03/22  0837 12/03/22  0649 12/02/22  2103 12/02/22  0804 12/02/22  0655 12/01/22  0831 12/01/22  0658 11/30/22  2235 11/30/22  2227 11/30/22  1228 11/30/22  0959 11/29/22  0808 11/29/22  0640 11/28/22  1731 11/28/22  0713   NA  --   --  139  --   --  138  --  135*  --   --   --  136  --  134*  --  136   POTASSIUM  --   --  3.9  --   --  3.8  --  4.2  --  3.8  --  3.3*  --  4.0  --  3.9   CHLORIDE  --   --  104  --   --  101  --  97*  --   --   --  97*  --  95*  --  99   CO2  --   --  26  --   --  26  --  25  --   --   --  27  --  25  --  27   ANIONGAP  --   --  9  --   --  11  --  13  --   --   --  12  --  14  --  10   * 154* 167* 259*   < > 95   < > 175*   < >  --    < > 96   < > 355*   < > 243*   BUN  --   --  59.7*  --   --  59.9*  --  57.5*  --   --   --  55.1*  --  41.4*  --  38.3*   CR  --   --  2.08*  --   --  2.07*  --  2.21*  --   --   --  2.31*  --  1.85*  --  2.15*   GFRESTIMATED  --   --  25*  --   --  25*  --  23*  --   --   --  22*  --  29*  --  24*   BILL  --   --  8.3*  --   --  8.6*  --  8.4*  --   --   --  8.3*  --  8.4*  --  8.8   MAG  --   --   --   --   --   --   --  2.1  --   --   --   --   --   --   --  1.9   PROTTOTAL  --   --   --   --   --   --   --   --   --   --   --   --   --  5.8*  --  6.3*   ALBUMIN  --   --   --   --   --   --   --   --   --   --   --   --   --  3.0*  --  3.6   BILITOTAL  --   --   --   --   --   --   --   --   --   --   --   --   --  0.5  --  0.6   ALKPHOS  --   --   --   --   --   --   --   --   --   --   --   --   --  110*  --  118*   AST  --   --   --   --   --   --   --   --   --   --   --   --    --  26  --  31   ALT  --   --   --   --   --   --   --   --   --   --   --   --   --  18  --  22    < > = values in this interval not displayed.     CBC  Recent Labs   Lab 22  0658 22  0640 22  0835   WBC  --  5.6 6.0   RBC  --  4.58 4.29   HGB  --  14.2 13.2   HCT  --  41.7 39.1   MCV  --  91 91   MCH  --  31.0 30.8   MCHC  --  34.1 33.8   RDW  --  12.5 12.7    157 146*     INR  Recent Labs   Lab 22  0920   INR 1.00     Arterial Blood GasNo lab results found in last 7 days.    Cultures: not yet available.    Imaging: personally reviewed.   Results for orders placed or performed during the hospital encounter of 22   XR Chest 1 View    Narrative    EXAM: XR CHEST 1 VIEW  LOCATION: [facility]  DATE/TIME: 2022 6:21 AM    INDICATION: Chest pain.  COMPARISON: Chest x-ray 2 views 2022 at 2153 hours.      Impression    IMPRESSION: Both lungs remain clear. No adenopathy or effusion. Normal cardiac size and pulmonary vascularity. Mild degenerative changes both shoulders and the spine. No significant interval change.   Echocardiogram Complete     Value    LVEF  55-60%    Narrative    [id number]  [id number]  [id number]  [id number]^JALEEL^KIERAN^[place] [address]     Name: LUIS MIGUEL GOODWIN  MRN: [id number]  : 1955  Study Date: 2022 01:38 PM  Age: 67 yrs  Gender: Female  Patient Location: [facility]  Reason For Study: Dyspnea  Ordering Physician: KIERAN MARMOLEJO  Referring Physician: KIERAN MARMOLEJO  Performed By: JACLYN     BSA: 1.9 m2  Height: 63 in  Weight: 190 lb  HR: 71  ______________________________________________________________________________  Procedure  Complete Echo Adult.  ______________________________________________________________________________  Interpretation Summary     Normal left ventricular size. Mild to moderate left ventricular hypertrophy.  Left  ventricular function appears normal. Left ventricular ejection fraction  estimated 55 to 60%.  Diastolic Doppler findings (E/E' ratio and/or other parameters) suggest left  ventricular filling pressures are increased.  Normal right ventricular size and systolic function.  Mild left atrial enlargement  No hemodynamically significant valve abnormality  Diastolic function appears abnormal.  ______________________________________________________________________________  I      WMSI = 1.00     % Normal = 100     X - Cannot   0 -                      (2) - Mildly 2 -          Segments  Size  Interpret    Hyperkinetic 1 - Normal  Hypokinetic  Hypokinetic  1-2     small                                                     7 -          3-5      moderate  3 - Akinetic 4 -          5 -         6 - Akinetic Dyskinetic   6-14    large               Dyskinetic   Aneurysmal  w/scar       w/scar       15-16   diffuse     Left Ventricle  The left ventricle is normal in size. There is mild to moderate concentric  left ventricular hypertrophy. Left ventricular systolic function is normal.  Diastolic Doppler findings (E/E' ratio and/or other parameters) suggest left  ventricular filling pressures are increased. The visual ejection fraction is  55-60%. Left ventricular diastolic function is abnormal. No regional wall  motion abnormalities noted.     Right Ventricle  Normal right ventricle size and systolic function. TAPSE is normal, which is  consistent with normal right ventricular systolic function.     Atria  The left atrium is mildly dilated. Right atrial size is normal.     Mitral Valve  There is moderate mitral annular calcification. There is mild (1+) mitral  regurgitation.     Tricuspid Valve  The tricuspid valve is not well visualized. There is trace tricuspid  regurgitation.     Aortic Valve  The aortic valve is trileaflet with aortic valve sclerosis. No hemodynamically  significant valvular aortic stenosis.     Pulmonic  Valve  The pulmonic valve is not well visualized. There is trace pulmonic valvular  regurgitation.     Vessels  The aorta root is normal. Normal size ascending aorta. IVC diameter <2.1 cm  collapsing >50% with sniff suggests a normal RA pressure of 3 mmHg.     Pericardium  There is no pericardial effusion.     ______________________________________________________________________________  MMode/2D Measurements & Calculations  IVSd: 1.3 cm  LVIDd: 4.4 cm  LVIDs: 3.0 cm  LVPWd: 1.3 cm  FS: 32.2 %  LV mass(C)d: 219.4 grams  LV mass(C)dI: 116.0 grams/m2  Ao root diam: 2.9 cm  LA dimension: 4.1 cm  asc Aorta Diam: 3.6 cm     LA/Ao: 1.4  LVOT diam: 1.9 cm  LVOT area: 2.7 cm2  LA Volume Indexed (AL/bp): 43.8 ml/m2  RWT: 0.60     Time Measurements  MM HR: 66.0 BPM     Doppler Measurements & Calculations  MV E max chepe: 132.0 cm/sec  MV A max chepe: 110.9 cm/sec  MV E/A: 1.2  MV max P.2 mmHg  MV mean PG: 3.2 mmHg  MV V2 VTI: 47.4 cm  MVA(VTI): 1.2 cm2     MV dec slope: 493.0 cm/sec2  MV dec time: 0.27 sec  Ao V2 max: 153.2 cm/sec  Ao max P.0 mmHg  Ao V2 mean: 116.1 cm/sec  Ao mean P.9 mmHg  Ao V2 VTI: 33.1 cm  ROBERT(I,D): 1.7 cm2  ROBERT(V,D): 1.6 cm2  LV V1 max PG: 3.5 mmHg  LV V1 max: 93.4 cm/sec  LV V1 VTI: 21.2 cm  SV(LVOT): 57.3 ml  SI(LVOT): 30.3 ml/m2  PA acc time: 0.18 sec  TR max chepe: 248.4 cm/sec  TR max P.7 mmHg  AV Chepe Ratio (DI): 0.61  ROBERT Index (cm2/m2): 0.91  E/E' av.8  Lateral E/e': 18.1  Medial E/e': 19.6     ______________________________________________________________________________  Report approved by: Siddharth Bain 2022 02:55 PM             Patient Active Problem List   Diagnosis     Insulin dependent type 2 diabetes mellitus (H)     Hyperlipidemia     Obesity     Essential hypertension, benign     Esophageal Reflux     Anxiety     Chronic fatigue syndrome     Hypothyroidism     Long term current use of insulin (H)     Macromastia     Polyneuropathy due to type 2 diabetes  mellitus (H)     Recurrent major depression in full remission (H)     Tobacco user     Vitamin D deficiency     Gastroesophageal reflux disease, esophagitis presence not specified     Obesity (BMI 30.0-34.9)     COVID-19     Acute kidney injury (H)     Viral pneumonia     SOB (shortness of breath)     Elevated troponin     Elevated brain natriuretic peptide (BNP) level     RSV infection     Hypertension, unspecified type       Adri Goode, DO  Pulmonary and Critical Care Attending  pgr [phone]    Securely message with the Vocera Web Console (learn more here)

## 2022-12-03 NOTE — PLAN OF CARE
Problem: Asthma Exacerbation  Goal: Asthma Symptom Relief  Outcome: Progressing     Pt breathing without difficulties.  No acute events overnight.  Pt maintaining oxygen saturation on RA.    Problem: Hypertension Comorbidity  Goal: Blood Pressure in Desired Range  Outcome: Progressing     BP within parameters this shift.

## 2022-12-03 NOTE — PLAN OF CARE
Problem: Hypertension Comorbidity  Goal: Blood Pressure in Desired Range  Outcome: Progressing   Goal Outcome Evaluation:       Blood pressures 160/70 177/70 Dr Jnoes aware changed Norvasc  To 10 mg daily

## 2022-12-03 NOTE — PLAN OF CARE
Problem: Diabetes Comorbidity  Goal: Blood Glucose Level Within Targeted Range  Outcome: Progressing   Goal Outcome Evaluation:       Pt is very pleasant and cooperative with her cares. Pt's BP was high midway through the shift.194/76. Brought her medications and rechecked it and the BP was 163/79. Pt's BG was also a little off at Hs 259, gave due coverage. Pt concerned that it might be the prednisone. Pt denied any pain. Exploratory wheezing noted with cough.

## 2022-12-04 VITALS
RESPIRATION RATE: 18 BRPM | SYSTOLIC BLOOD PRESSURE: 148 MMHG | BODY MASS INDEX: 33.66 KG/M2 | WEIGHT: 190 LBS | TEMPERATURE: 98 F | OXYGEN SATURATION: 96 % | DIASTOLIC BLOOD PRESSURE: 60 MMHG | HEART RATE: 62 BPM | HEIGHT: 63 IN

## 2022-12-04 PROBLEM — R06.02 SOB (SHORTNESS OF BREATH): Status: RESOLVED | Noted: 2022-11-28 | Resolved: 2022-12-04

## 2022-12-04 PROBLEM — R79.89 ELEVATED BRAIN NATRIURETIC PEPTIDE (BNP) LEVEL: Status: RESOLVED | Noted: 2022-11-29 | Resolved: 2022-12-04

## 2022-12-04 PROBLEM — R79.89 ELEVATED TROPONIN: Status: RESOLVED | Noted: 2022-11-29 | Resolved: 2022-12-04

## 2022-12-04 LAB
ANION GAP SERPL CALCULATED.3IONS-SCNC: 8 MMOL/L (ref 7–15)
BUN SERPL-MCNC: 57.6 MG/DL (ref 8–23)
CALCIUM SERPL-MCNC: 8.1 MG/DL (ref 8.8–10.2)
CHLORIDE SERPL-SCNC: 104 MMOL/L (ref 98–107)
CREAT SERPL-MCNC: 1.81 MG/DL (ref 0.51–0.95)
DEPRECATED HCO3 PLAS-SCNC: 25 MMOL/L (ref 22–29)
GFR SERPL CREATININE-BSD FRML MDRD: 30 ML/MIN/1.73M2
GLUCOSE BLDC GLUCOMTR-MCNC: 90 MG/DL (ref 70–99)
GLUCOSE SERPL-MCNC: 144 MG/DL (ref 70–99)
PLATELET # BLD AUTO: 188 10E3/UL (ref 150–450)
POTASSIUM SERPL-SCNC: 4.1 MMOL/L (ref 3.4–5.3)
SODIUM SERPL-SCNC: 137 MMOL/L (ref 136–145)

## 2022-12-04 PROCEDURE — 999N000156 HC STATISTIC RCP CONSULT EA 30 MIN

## 2022-12-04 PROCEDURE — 250N000011 HC RX IP 250 OP 636: Performed by: INTERNAL MEDICINE

## 2022-12-04 PROCEDURE — 36415 COLL VENOUS BLD VENIPUNCTURE: CPT | Performed by: INTERNAL MEDICINE

## 2022-12-04 PROCEDURE — 99239 HOSP IP/OBS DSCHRG MGMT >30: CPT | Performed by: INTERNAL MEDICINE

## 2022-12-04 PROCEDURE — 999N000157 HC STATISTIC RCP TIME EA 10 MIN

## 2022-12-04 PROCEDURE — 250N000009 HC RX 250: Performed by: HOSPITALIST

## 2022-12-04 PROCEDURE — 250N000013 HC RX MED GY IP 250 OP 250 PS 637: Performed by: INTERNAL MEDICINE

## 2022-12-04 PROCEDURE — 82310 ASSAY OF CALCIUM: CPT | Performed by: INTERNAL MEDICINE

## 2022-12-04 PROCEDURE — 94640 AIRWAY INHALATION TREATMENT: CPT

## 2022-12-04 PROCEDURE — 85049 AUTOMATED PLATELET COUNT: CPT | Performed by: INTERNAL MEDICINE

## 2022-12-04 PROCEDURE — 82374 ASSAY BLOOD CARBON DIOXIDE: CPT | Performed by: INTERNAL MEDICINE

## 2022-12-04 PROCEDURE — 250N000013 HC RX MED GY IP 250 OP 250 PS 637: Performed by: HOSPITALIST

## 2022-12-04 PROCEDURE — 250N000012 HC RX MED GY IP 250 OP 636 PS 637: Performed by: INTERNAL MEDICINE

## 2022-12-04 RX ORDER — AMLODIPINE BESYLATE 10 MG/1
10 TABLET ORAL DAILY
Qty: 30 TABLET | Refills: 0 | Status: SHIPPED | OUTPATIENT
Start: 2022-12-05 | End: 2022-12-04

## 2022-12-04 RX ORDER — LEVOTHYROXINE SODIUM 100 UG/1
100 TABLET ORAL DAILY
Qty: 30 TABLET | Refills: 0 | Status: SHIPPED | OUTPATIENT
Start: 2022-12-05 | End: 2022-12-04

## 2022-12-04 RX ORDER — AZITHROMYCIN 250 MG/1
250 TABLET, FILM COATED ORAL DAILY
Qty: 1 TABLET | Refills: 0 | Status: SHIPPED | OUTPATIENT
Start: 2022-12-05 | End: 2023-03-03

## 2022-12-04 RX ORDER — HYDROCHLOROTHIAZIDE 12.5 MG/1
25 CAPSULE ORAL DAILY
Qty: 60 CAPSULE | Refills: 0 | Status: SHIPPED | OUTPATIENT
Start: 2022-12-05 | End: 2022-12-04

## 2022-12-04 RX ORDER — HYDROCHLOROTHIAZIDE 12.5 MG/1
25 CAPSULE ORAL DAILY
Status: DISCONTINUED | OUTPATIENT
Start: 2022-12-05 | End: 2022-12-04 | Stop reason: HOSPADM

## 2022-12-04 RX ORDER — PREDNISONE 10 MG/1
TABLET ORAL
Qty: 35 TABLET | Refills: 0 | Status: SHIPPED | OUTPATIENT
Start: 2022-12-04 | End: 2022-12-04

## 2022-12-04 RX ORDER — ALBUTEROL SULFATE 0.83 MG/ML
2.5 SOLUTION RESPIRATORY (INHALATION) EVERY 6 HOURS PRN
Status: DISCONTINUED | OUTPATIENT
Start: 2022-12-04 | End: 2022-12-04 | Stop reason: HOSPADM

## 2022-12-04 RX ORDER — LEVOTHYROXINE SODIUM 100 UG/1
100 TABLET ORAL DAILY
Qty: 30 TABLET | Refills: 0 | Status: SHIPPED | OUTPATIENT
Start: 2022-12-05

## 2022-12-04 RX ORDER — PREDNISONE 10 MG/1
TABLET ORAL
Qty: 35 TABLET | Refills: 0 | Status: SHIPPED | OUTPATIENT
Start: 2022-12-04 | End: 2022-12-21

## 2022-12-04 RX ORDER — HYDROCHLOROTHIAZIDE 12.5 MG/1
25 CAPSULE ORAL DAILY
Qty: 60 CAPSULE | Refills: 0 | Status: SHIPPED | OUTPATIENT
Start: 2022-12-05 | End: 2023-03-08

## 2022-12-04 RX ORDER — HYDROCHLOROTHIAZIDE 12.5 MG/1
12.5 TABLET ORAL ONCE
Status: COMPLETED | OUTPATIENT
Start: 2022-12-04 | End: 2022-12-04

## 2022-12-04 RX ORDER — METOPROLOL TARTRATE 100 MG
100 TABLET ORAL 2 TIMES DAILY
Qty: 60 TABLET | Refills: 0 | Status: SHIPPED | OUTPATIENT
Start: 2022-12-04 | End: 2022-12-04

## 2022-12-04 RX ORDER — AMLODIPINE BESYLATE 10 MG/1
10 TABLET ORAL DAILY
Qty: 30 TABLET | Refills: 0 | Status: SHIPPED | OUTPATIENT
Start: 2022-12-05 | End: 2023-03-08

## 2022-12-04 RX ORDER — AZITHROMYCIN 250 MG/1
250 TABLET, FILM COATED ORAL DAILY
Qty: 1 TABLET | Refills: 0 | Status: SHIPPED | OUTPATIENT
Start: 2022-12-05 | End: 2022-12-04

## 2022-12-04 RX ADMIN — ALBUTEROL SULFATE 2.5 MG: 2.5 SOLUTION RESPIRATORY (INHALATION) at 08:58

## 2022-12-04 RX ADMIN — INSULIN GLARGINE 35 UNITS: 100 INJECTION, SOLUTION SUBCUTANEOUS at 08:30

## 2022-12-04 RX ADMIN — FLUTICASONE PROPIONATE 2 SPRAY: 50 SPRAY, METERED NASAL at 08:27

## 2022-12-04 RX ADMIN — HYDROCHLOROTHIAZIDE 12.5 MG: 12.5 CAPSULE ORAL at 08:28

## 2022-12-04 RX ADMIN — METOPROLOL TARTRATE 100 MG: 100 TABLET, FILM COATED ORAL at 08:28

## 2022-12-04 RX ADMIN — PREDNISONE 30 MG: 20 TABLET ORAL at 08:29

## 2022-12-04 RX ADMIN — HEPARIN SODIUM 5000 UNITS: 5000 INJECTION, SOLUTION INTRAVENOUS; SUBCUTANEOUS at 06:48

## 2022-12-04 RX ADMIN — SENNOSIDES AND DOCUSATE SODIUM 1 TABLET: 50; 8.6 TABLET ORAL at 08:39

## 2022-12-04 RX ADMIN — LEVOTHYROXINE SODIUM 100 MCG: 100 TABLET ORAL at 08:28

## 2022-12-04 RX ADMIN — PANTOPRAZOLE SODIUM 40 MG: 40 TABLET, DELAYED RELEASE ORAL at 06:49

## 2022-12-04 RX ADMIN — AMLODIPINE BESYLATE 10 MG: 5 TABLET ORAL at 08:29

## 2022-12-04 RX ADMIN — AZITHROMYCIN MONOHYDRATE 250 MG: 250 TABLET ORAL at 08:28

## 2022-12-04 RX ADMIN — HYDROCHLOROTHIAZIDE 12.5 MG: 12.5 TABLET ORAL at 11:06

## 2022-12-04 ASSESSMENT — ACTIVITIES OF DAILY LIVING (ADL)
ADLS_ACUITY_SCORE: 18

## 2022-12-04 NOTE — PLAN OF CARE
Goal Outcome Evaluation:    Pt. Steady on feet, on room air, and no cough heard this time, but had occasional cough, for report.

## 2022-12-04 NOTE — PROGRESS NOTES
[facility]    Medicine Progress Note - Hospitalist Service    Date of Admission:  11/28/2022    Assessment & Plan   Mrs. Keene 67F with history of asthma, obesity, HTN, diabetes, CKD who presents with shortness of breath and cough and found to have RSV and asthma exacerbation.    RSV with severe asthma exacerbation   -significant wheeze and paroxysms of cough.    -continue bronchodilator nebs  -Switch Solu-Medrol 40 every 8 to oral prednisone 30 mg twice daily  -Continue Singulair, Flonase  -tessalon for cough  -Pulmonology consulted, appreciate recommendations  -Started on azithromycin as Pro-Bradley is elevated     BRAXTON on CKD3- stable  -Baseline creatinine approximately 1.8, in setting of diuresis and resuming ARB.  Continue to hold ARB  -Restarting hydrochlorothiazide and will monitor renal function     Essential HTN   - holding ARB.  Restarting hydrochlorothiazide Continue lopressor.  Started norvasc, increased to 10 mg   -Labetalol IV as needed for acute elevations     DM2 -   -home regimen tresiba 48, novolog  -inpatient - lantus 35bid, 1:7CE, sliding scale insulin    Hypothyroidism, acquired  -Levothyroxine 75 mcg at home, increasing to 100 mcg daily, will need recheck of TSH in 3 to 6 weeks  -11/28: TSH 7.05, free T4 0.77         Diet: Combination Diet Regular Diet Adult; Moderate Consistent Carb (60 g CHO per Meal) Diet  Room Service    DVT Prophylaxis: Heparin SQ  Rao Catheter: Not present  Central Lines: None  Cardiac Monitoring: None  Code Status: Full Code      Disposition Plan     Expected Discharge Date: 12/04/2022    Discharge Delays: IV Medication - consider oral or Home Infusion  Specialist Consult (enter specialist & decision needed in comments)  Destination: home  Discharge Comments: ISO for RSV  pulm consult  indep at baseline        The patient's care was discussed with the Patient.    Kadie Jones MD  Hospitalist Service  [facility]  "Hospital  Securely message with the Vocera Web Console (learn more here)  Text page via [employer] Paging/Directory         Clinically Significant Risk Factors              # Hypoalbuminemia: Lowest albumin = 3 g/dL (Ref range: 3.5-5.2) at 11/29/2022  6:40 AM, will monitor as appropriate          # Obesity: Estimated body mass index is 33.66 kg/m  as calculated from the following:    Height as of this encounter: 1.6 m (5' 3\").    Weight as of this encounter: 86.2 kg (190 lb).          ______________________________________________________________________    Interval History   Mrs. Patel is doing well, she did notice that at night she can hear her breathing loudly. Otherwise she has no complaints. She continues to have significant wheezing and cough.     Data reviewed today: I reviewed all medications, new labs and imaging results over the last 24 hours. I personally reviewed no images or EKG's today.    Physical Exam   Vital Signs: Temp: 98  F (36.7  C) Temp src: Oral BP: (!) 177/81 (rn notified) Pulse: 59   Resp: 20 SpO2: 92 % O2 Device: None (Room air)    Weight: 190 lbs 0 oz  General Appearance: Awake, alert, in no acute distress  Respiratory: bilateral wheeze, cough, better air movement  Cardiovascular: RRR, no murmur noted  GI: soft, nontender, non distended, normal bowel sounds  Skin: no jaundice, no rash      Data   Recent Labs   Lab 12/03/22  1743 12/03/22  1245 12/03/22  0837 12/03/22  0649 12/02/22  0804 12/02/22  0655 12/01/22  0831 12/01/22  0658 11/29/22  0808 11/29/22  0640 11/28/22  1731 11/28/22  0920 11/28/22  0835 11/28/22  0713   WBC  --   --   --   --   --   --   --   --   --  5.6  --   --  6.0  --    HGB  --   --   --   --   --   --   --   --   --  14.2  --   --  13.2  --    MCV  --   --   --   --   --   --   --   --   --  91  --   --  91  --    PLT  --   --   --   --   --   --   --  195  --  157  --   --  146*  --    INR  --   --   --   --   --   --   --   --   --   --   --  1.00  --   --    NA  -- "   --   --  139  --  138  --  135*   < > 134*  --   --   --  136   POTASSIUM  --   --   --  3.9  --  3.8  --  4.2   < > 4.0  --   --   --  3.9   CHLORIDE  --   --   --  104  --  101  --  97*   < > 95*  --   --   --  99   CO2  --   --   --  26  --  26  --  25   < > 25  --   --   --  27   BUN  --   --   --  59.7*  --  59.9*  --  57.5*   < > 41.4*  --   --   --  38.3*   CR  --   --   --  2.08*  --  2.07*  --  2.21*   < > 1.85*  --   --   --  2.15*   ANIONGAP  --   --   --  9  --  11  --  13   < > 14  --   --   --  10   BILL  --   --   --  8.3*  --  8.6*  --  8.4*   < > 8.4*  --   --   --  8.8   * 156* 154* 167*   < > 95   < > 175*   < > 355*   < >  --   --  243*   ALBUMIN  --   --   --   --   --   --   --   --   --  3.0*  --   --   --  3.6   PROTTOTAL  --   --   --   --   --   --   --   --   --  5.8*  --   --   --  6.3*   BILITOTAL  --   --   --   --   --   --   --   --   --  0.5  --   --   --  0.6   ALKPHOS  --   --   --   --   --   --   --   --   --  110*  --   --   --  118*   ALT  --   --   --   --   --   --   --   --   --  18  --   --   --  22   AST  --   --   --   --   --   --   --   --   --  26  --   --   --  31    < > = values in this interval not displayed.     Medications       albuterol  2.5 mg Nebulization BID     [START ON 12/4/2022] amLODIPine  10 mg Oral Daily     azithromycin  250 mg Oral Daily     fluticasone  2 spray Both Nostrils Daily     heparin ANTICOAGULANT  5,000 Units Subcutaneous Q12H     hydrochlorothiazide  12.5 mg Oral Daily     insulin aspart  1-22 Units Subcutaneous TID AC     insulin aspart  1-16 Units Subcutaneous At Bedtime     insulin aspart   Subcutaneous TID w/meals     insulin glargine  35 Units Subcutaneous BID     [Held by provider] irbesartan  300 mg Oral Daily     levothyroxine  100 mcg Oral Daily     metoprolol tartrate  100 mg Oral BID     montelukast  10 mg Oral At Bedtime     pantoprazole  40 mg Oral QAM AC     predniSONE  30 mg Oral BID w/meals     rosuvastatin  5 mg  Oral At Bedtime     senna-docusate  1 tablet Oral BID    Or     senna-docusate  2 tablet Oral BID     sodium chloride (PF)  3 mL Intracatheter Q8H

## 2022-12-04 NOTE — DISCHARGE INSTRUCTIONS
Call with fever over 101  Call with blood sugars over 300 or less than 60  Call with respiratory distress   Call with questions or concerns

## 2022-12-04 NOTE — PROGRESS NOTES
Care Management Discharge Note    Discharge Date: 12/04/2022       Discharge Disposition: Home    Discharge Services:  (self care, assist as needed)    Discharge DME:  none    Discharge Transportation: family or friend will provide    Private pay costs discussed: Not applicable    Education Provided on the Discharge Plan:  Per Care Team     Persons Notified of Discharge Plans: patient    Patient/Family in Agreement with the Plan: yes    Handoff Referral Completed: Yes    Additional Information:  RNCM completed chart review.  Pulmonary service has signed off and placed recommendations.  Discharge orders noted.    Discharge Plan:  Patient discharging home    No care management needs identified during this admission.  Patient is independent at baseline.    Hiral Turner RN

## 2022-12-04 NOTE — PROGRESS NOTES
Pulmonary Update Note    Will finish with 4 more days of prednisone  Should get PFTs and be seen in clinic  Alerted clinic    Will sign off, please let our service know if any change in clinical condition or questions.    Adri Goode, DO  Pulmonary and Critical Care Attending  [facility] [phone]

## 2022-12-04 NOTE — PLAN OF CARE
Problem: Asthma Exacerbation  Goal: Asthma Symptom Relief  Intervention: Support Asthma Symptom Control  Recent Flowsheet Documentation  Taken 12/3/2022 1630 by Vianca Gutierrez, RN  Medication Review/Management: medications reviewed   Goal Outcome Evaluation:         Pt lungs sounded clear, less cough noted. BP seemed better controlled today.  177/81, gave extra 5mg of Amlodipine down to 143/69. BG= 205 and 310 gave due coverage.

## 2022-12-04 NOTE — TREATMENT PLAN
RCAT Treatment Plan     Patient Score: 3  Patient Acuity: 5     Clinical Indication for Therapy: hx of asthma      Therapy Ordered: Albuterol BID changed to PRN      Patient breath sounds are diminished but clear bilaterally, She is on room air and ambulating by herself. We reviewed the importance of movement, deep breathing, and coughing. RT will change nebulizer to PRN as she can utilize her inhaler if needed. We reviewed that the nebulizer and her inhaler are the same medication and the patient is ok with this plan at this time.   RT to follow.   Tamiko Luis, RT

## 2022-12-06 ENCOUNTER — LAB REQUISITION (OUTPATIENT)
Dept: LAB | Facility: CLINIC | Age: 67
End: 2022-12-06

## 2022-12-06 DIAGNOSIS — I10 ESSENTIAL (PRIMARY) HYPERTENSION: ICD-10-CM

## 2022-12-06 LAB
ANION GAP SERPL CALCULATED.3IONS-SCNC: 11 MMOL/L (ref 7–15)
BUN SERPL-MCNC: 54 MG/DL (ref 8–23)
CALCIUM SERPL-MCNC: 8.7 MG/DL (ref 8.8–10.2)
CHLORIDE SERPL-SCNC: 102 MMOL/L (ref 98–107)
CREAT SERPL-MCNC: 2 MG/DL (ref 0.51–0.95)
DEPRECATED HCO3 PLAS-SCNC: 27 MMOL/L (ref 22–29)
GFR SERPL CREATININE-BSD FRML MDRD: 26 ML/MIN/1.73M2
GLUCOSE SERPL-MCNC: 46 MG/DL (ref 70–99)
POTASSIUM SERPL-SCNC: 4.3 MMOL/L (ref 3.4–5.3)
SODIUM SERPL-SCNC: 140 MMOL/L (ref 136–145)
TSH SERPL DL<=0.005 MIU/L-ACNC: 3.09 UIU/ML (ref 0.3–4.2)

## 2022-12-06 PROCEDURE — 82310 ASSAY OF CALCIUM: CPT | Performed by: PHYSICIAN ASSISTANT

## 2022-12-06 PROCEDURE — 84443 ASSAY THYROID STIM HORMONE: CPT | Performed by: PHYSICIAN ASSISTANT

## 2022-12-08 ENCOUNTER — LAB REQUISITION (OUTPATIENT)
Dept: LAB | Facility: CLINIC | Age: 67
End: 2022-12-08

## 2022-12-08 ENCOUNTER — PATIENT OUTREACH (OUTPATIENT)
Dept: CARE COORDINATION | Facility: CLINIC | Age: 67
End: 2022-12-08

## 2022-12-08 DIAGNOSIS — E78.5 HYPERLIPIDEMIA, UNSPECIFIED: ICD-10-CM

## 2022-12-08 LAB
CHOLEST SERPL-MCNC: 238 MG/DL
HDLC SERPL-MCNC: 63 MG/DL
LDLC SERPL CALC-MCNC: 137 MG/DL
NONHDLC SERPL-MCNC: 175 MG/DL
TRIGL SERPL-MCNC: 191 MG/DL

## 2022-12-08 PROCEDURE — 80061 LIPID PANEL: CPT | Performed by: PHYSICIAN ASSISTANT

## 2022-12-08 NOTE — PROGRESS NOTES
Clinic Care Coordination Contact  Care Team Conversations    Patient identified for care management outreach, however [facility] RN staff has already followed up with patient to ensure they are following up with PCP and have needs and resources met. Clinic RN will refer back to TERRENCE AVILA if needed/appropriate.    Mary Beth Love, [facility]  Social Work Care Coordinator - [facility]  Care Coordination  [email or web address]  Cell Phone: [phone]  Gender pronouns: she/her  Employed by [employer]

## 2022-12-09 NOTE — DISCHARGE SUMMARY
[facility]  Hospitalist Discharge Summary      Date of Admission:  11/28/2022  Date of Discharge:  12/4/2022  5:01 PM  Discharging Provider: Kadie Jones MD  Discharge Service: Hospitalist Service    Discharge Diagnoses   RSV and severe asthma exacerbation  BRAXTON on CKD stage IIIb/IV  Essential hypertension  Diabetes mellitus type 2 on long-term insulin  Acquired hypothyroidism    Follow-ups Needed After Discharge   Follow-up Appointments     Follow-up and recommended labs and tests       Follow up with primary care provider, Indiana Nixon, within 7 days for   hospital follow- up and regarding new diagnosis.  The following labs/tests   are recommended: TSH due to change in medicaiton.             Unresulted Labs Ordered in the Past 30 Days of this Admission     No orders found from 10/29/2022 to 11/29/2022.      These results will be followed up by Kadie Jones    Discharge Disposition   Discharged to home  Condition at discharge: Stable    Hospital Course    Mrs. Keene 67F with history of asthma, obesity, HTN, diabetes, CKD who presents with shortness of breath and cough and found to have RSV and asthma exacerbation.     RSV with severe asthma exacerbation   -significant wheeze and paroxysms of cough  -bronchodilator nebs  -Switch Solu-Medrol 40 every 8 to oral prednisone 30 mg twice daily, discharged on long prednisone taper  -Continue Singulair, Flonase  -Pulmonology consulted, appreciate recommendations  -Started on azithromycin as Pro-Bradley is elevated, discharged with remaining doses     CKD stage 4  -Baseline creatinine approximately 1.8-2.0     Essential HTN   -Discontinued irbesartan, increased hydrochlorothiazide to 25 mg daily, added amlodipine 10 mg  -Labetalol IV as needed for acute elevations     DM2  -home regimen tresiba 48, novolog  -inpatient - lantus 35bid, 1:7CE, sliding scale insulin     Hypothyroidism, acquired  -Levothyroxine 75 mcg at home, increasing to 100  mcg daily, will need recheck of TSH in 3 to 6 weeks  -11/28: TSH 7.05, free T4 0.77    Consultations This Hospital Stay   PHYSICAL THERAPY ADULT IP CONSULT  OCCUPATIONAL THERAPY ADULT IP CONSULT  PULMONARY IP CONSULT    Code Status   Prior    Time Spent on this Encounter   I, Kadie Jones MD, personally saw the patient today and spent greater than 30 minutes discharging this patient.       Kadie Jones MD  [address] [address]  Phone: [phone]  Fax: [phone]  ______________________________________________________________________    Physical Exam   Vital Signs:                   Weight: 190 lbs 0 oz  General Appearance: Awake, alert, in no acute distress  Respiratory: CTAB, scattered wheezes  Cardiovascular: RRR, no murmur noted  GI: soft, nontender, non distended, normal bowel sounds  Skin: no jaundice, no rash         Primary Care Physician   Indiana Nixon    Discharge Orders      Adult Pulmonary Medicine Referral      Reason for your hospital stay    RSV, asthma exacerbation     Activity    Your activity upon discharge: activity as tolerated     Follow-up and recommended labs and tests     Follow up with primary care provider, Indiana Nixon, within 7 days for hospital follow- up and regarding new diagnosis.  The following labs/tests are recommended: TSH due to change in medicaiton.     General PFT Lab (Please always keep checked)     Diet    Follow this diet upon discharge: Combination Diet Regular Diet Adult; Moderate Consistent Carb (60 g CHO per Meal) Diet       Significant Results and Procedures   Most Recent 3 CBC's:Recent Labs   Lab Test 12/04/22  0624 12/01/22  0658 11/29/22  0640 11/28/22  0835 06/17/22  0130   WBC  --   --  5.6 6.0 7.2   HGB  --   --  14.2 13.2 14.2   MCV  --   --  91 91 91    195 157 146* 171     Most Recent 3 BMP's:Recent Labs   Lab Test 12/06/22  1135 12/04/22  0821 12/04/22  0624 12/03/22  0837  22  0649     --  137  --  139   POTASSIUM 4.3  --  4.1  --  3.9   CHLORIDE 102  --  104  --  104   CO2 27  --  25  --  26   BUN 54.0*  --  57.6*  --  59.7*   CR 2.00*  --  1.81*  --  2.08*   ANIONGAP 11  --  8  --  9   BILL 8.7*  --  8.1*  --  8.3*   GLC 46* 90 144*   < > 167*    < > = values in this interval not displayed.     Most Recent 2 LFT's:Recent Labs   Lab Test 22  0640 22  0713   AST 26 31   ALT 18 22   ALKPHOS 110* 118*   BILITOTAL 0.5 0.6   ,   Results for orders placed or performed during the hospital encounter of 22   XR Chest 1 View    Narrative    EXAM: XR CHEST 1 VIEW  LOCATION: [facility]  DATE/TIME: 2022 6:21 AM    INDICATION: Chest pain.  COMPARISON: Chest x-ray 2 views 2022 at 2153 hours.      Impression    IMPRESSION: Both lungs remain clear. No adenopathy or effusion. Normal cardiac size and pulmonary vascularity. Mild degenerative changes both shoulders and the spine. No significant interval change.   XR Chest Port 1 View    Narrative    EXAM: XR CHEST PORT 1 VIEW  LOCATION: [facility]  DATE/TIME: 2022 1:55 PM    INDICATION: asthma not improving  COMPARISON: PA view of the chest 2022      Impression    IMPRESSION:     Symmetric normal lung inflation. No interstitial or alveolar opacities. Normal lung vascularity.    Normal heart and mediastinal contours.    Moderate lower thoracic spine degenerative osteophytes.    No pleural space abnormality.   Echocardiogram Complete     Value    LVEF  55-60%    Narrative    [id number]  [id number]  [id number]  [id number]^JALEEL^KIERAN^[place] [address]     Name: LUIS MIGUEL GOODWIN  MRN: [id number]  : 1955  Study Date: 2022 01:38 PM  Age: 67 yrs  Gender: Female  Patient Location: [facility]  Reason For Study: Dyspnea  Ordering Physician: KIERAN MARMOLEJO  Referring Physician: JALEEL  KIERAN MYNOR  Performed By:      BSA: 1.9 m2  Height: 63 in  Weight: 190 lb  HR: 71  ______________________________________________________________________________  Procedure  Complete Echo Adult.  ______________________________________________________________________________  Interpretation Summary     Normal left ventricular size. Mild to moderate left ventricular hypertrophy.  Left ventricular function appears normal. Left ventricular ejection fraction  estimated 55 to 60%.  Diastolic Doppler findings (E/E' ratio and/or other parameters) suggest left  ventricular filling pressures are increased.  Normal right ventricular size and systolic function.  Mild left atrial enlargement  No hemodynamically significant valve abnormality  Diastolic function appears abnormal.  ______________________________________________________________________________  I      WMSI = 1.00     % Normal = 100     X - Cannot   0 -                      (2) - Mildly 2 -          Segments  Size  Interpret    Hyperkinetic 1 - Normal  Hypokinetic  Hypokinetic  1-2     small                                                     7 -          3-5      moderate  3 - Akinetic 4 -          5 -         6 - Akinetic Dyskinetic   6-14    large               Dyskinetic   Aneurysmal  w/scar       w/scar       15-16   diffuse     Left Ventricle  The left ventricle is normal in size. There is mild to moderate concentric  left ventricular hypertrophy. Left ventricular systolic function is normal.  Diastolic Doppler findings (E/E' ratio and/or other parameters) suggest left  ventricular filling pressures are increased. The visual ejection fraction is  55-60%. Left ventricular diastolic function is abnormal. No regional wall  motion abnormalities noted.     Right Ventricle  Normal right ventricle size and systolic function. TAPSE is normal, which is  consistent with normal right ventricular systolic function.     Atria  The left atrium is mildly dilated. Right  atrial size is normal.     Mitral Valve  There is moderate mitral annular calcification. There is mild (1+) mitral  regurgitation.     Tricuspid Valve  The tricuspid valve is not well visualized. There is trace tricuspid  regurgitation.     Aortic Valve  The aortic valve is trileaflet with aortic valve sclerosis. No hemodynamically  significant valvular aortic stenosis.     Pulmonic Valve  The pulmonic valve is not well visualized. There is trace pulmonic valvular  regurgitation.     Vessels  The aorta root is normal. Normal size ascending aorta. IVC diameter <2.1 cm  collapsing >50% with sniff suggests a normal RA pressure of 3 mmHg.     Pericardium  There is no pericardial effusion.     ______________________________________________________________________________  MMode/2D Measurements & Calculations  IVSd: 1.3 cm  LVIDd: 4.4 cm  LVIDs: 3.0 cm  LVPWd: 1.3 cm  FS: 32.2 %  LV mass(C)d: 219.4 grams  LV mass(C)dI: 116.0 grams/m2  Ao root diam: 2.9 cm  LA dimension: 4.1 cm  asc Aorta Diam: 3.6 cm     LA/Ao: 1.4  LVOT diam: 1.9 cm  LVOT area: 2.7 cm2  LA Volume Indexed (AL/bp): 43.8 ml/m2  RWT: 0.60     Time Measurements  MM HR: 66.0 BPM     Doppler Measurements & Calculations  MV E max chepe: 132.0 cm/sec  MV A max chepe: 110.9 cm/sec  MV E/A: 1.2  MV max P.2 mmHg  MV mean PG: 3.2 mmHg  MV V2 VTI: 47.4 cm  MVA(VTI): 1.2 cm2     MV dec slope: 493.0 cm/sec2  MV dec time: 0.27 sec  Ao V2 max: 153.2 cm/sec  Ao max P.0 mmHg  Ao V2 mean: 116.1 cm/sec  Ao mean P.9 mmHg  Ao V2 VTI: 33.1 cm  ROBETR(I,D): 1.7 cm2  ROBERT(V,D): 1.6 cm2  LV V1 max PG: 3.5 mmHg  LV V1 max: 93.4 cm/sec  LV V1 VTI: 21.2 cm  SV(LVOT): 57.3 ml  SI(LVOT): 30.3 ml/m2  PA acc time: 0.18 sec  TR max chepe: 248.4 cm/sec  TR max P.7 mmHg  AV Chepe Ratio (DI): 0.61  ROBERT Index (cm2/m2): 0.91  E/E' av.8  Lateral E/e': 18.1  Medial E/e': 19.6     ______________________________________________________________________________  Report approved by: Enrique  Siddharth Colindres 11/28/2022 02:55 PM               Discharge Medications   Discharge Medication List as of 12/4/2022  4:10 PM      CONTINUE these medications which have CHANGED    Details   amLODIPine (NORVASC) 10 MG tablet Take 1 tablet (10 mg) by mouth daily, Disp-30 tablet, R-0, E-Prescribe      azithromycin (ZITHROMAX) 250 MG tablet Take 1 tablet (250 mg) by mouth daily, Disp-1 tablet, R-0, E-Prescribe      hydrochlorothiazide (MICROZIDE) 12.5 MG capsule Take 2 capsules (25 mg) by mouth daily, Disp-60 capsule, R-0, E-Prescribe      levothyroxine (SYNTHROID/LEVOTHROID) 100 MCG tablet Take 1 tablet (100 mcg) by mouth daily, Disp-30 tablet, R-0, E-Prescribe      predniSONE (DELTASONE) 10 MG tablet Take 4 tablets (40 mg) by mouth daily for 3 days, THEN 3 tablets (30 mg) daily for 4 days, THEN 2 tablets (20 mg) daily for 3 days, THEN 1 tablet (10 mg) daily for 3 days, THEN 0.5 tablets (5 mg) daily for 4 days., Disp-35 tablet, R-0, E-Prescribe         CONTINUE these medications which have NOT CHANGED    Details   albuterol (PROAIR HFA;PROVENTIL HFA;VENTOLIN HFA) 90 mcg/actuation inhaler [ALBUTEROL (PROAIR HFA;PROVENTIL HFA;VENTOLIN HFA) 90 MCG/ACTUATION INHALER] Inhale 2 puffs every 4 (four) hours as needed for wheezing (bronchospasm)., Disp-1 Inhaler, R-0, PrintMay substitute the equivalent medication per insurance preference.      BD PEN NEEDLE ALEJANDRO 2ND GEN 32G X 4 MM miscellaneous USE THREE TIMES DAILY WITH NOVOLOG., NALINI, Historical      Lancets (ONETOUCH DELICA PLUS [id number]) MISC TEST THREE TIMES DAILY, Historical      metoprolol tartrate (LOPRESSOR) 50 MG tablet [METOPROLOL TARTRATE (LOPRESSOR) 50 MG TABLET] Take 2 tablets (100 mg total) by mouth 2 (two) times a day., Disp-60 tablet, R-0, Print      omeprazole (PRILOSEC) 20 MG capsule [OMEPRAZOLE (PRILOSEC) 20 MG CAPSULE] Take 1 capsule (20 mg total) by mouth daily before breakfast., Disp-30 capsule, R-0, Print      rosuvastatin (CRESTOR) 5 MG tablet [ROSUVASTATIN  (CRESTOR) 5 MG TABLET] Take 1 tablet (5 mg total) by mouth daily., Disp-30 tablet, R-0, Print      TRESIBA FLEXTOUCH 100 UNIT/ML pen ADMINISTER 48 UNITS UNDER THE SKIN EVERY DAY, NALINI, Historical         STOP taking these medications       irbesartan (AVAPRO) 300 MG tablet Comments:   Reason for Stopping:         NOVOLOG FLEXPEN 100 UNIT/ML soln Comments:   Reason for Stopping:             Allergies   Allergies   Allergen Reactions     Alprazolam Other (See Comments)     Cephalexin Rash and Swelling     Exenatide Other (See Comments)     Hydralazine      Flush and Elevated BP      Iodinated Contrast Media [Diagnostic X-Ray Materials] Unknown     Lisinopril Other (See Comments)     Nitrofurantoin Monohyd/M-Cryst [Nitrofurantoin] Rash     Sulfa (Sulfonamide Antibiotics) [Sulfa Drugs] Other (See Comments)

## 2022-12-12 DIAGNOSIS — J45.21 MILD INTERMITTENT ASTHMA WITH (ACUTE) EXACERBATION: Primary | ICD-10-CM

## 2022-12-28 ENCOUNTER — TRANSFERRED RECORDS (OUTPATIENT)
Dept: HEALTH INFORMATION MANAGEMENT | Facility: CLINIC | Age: 67
End: 2022-12-28

## 2022-12-28 ENCOUNTER — LAB REQUISITION (OUTPATIENT)
Dept: LAB | Facility: CLINIC | Age: 67
End: 2022-12-28

## 2022-12-28 DIAGNOSIS — N18.32 CHRONIC KIDNEY DISEASE, STAGE 3B (H): ICD-10-CM

## 2022-12-28 LAB
ANION GAP SERPL CALCULATED.3IONS-SCNC: 12 MMOL/L (ref 7–15)
BUN SERPL-MCNC: 36.5 MG/DL (ref 8–23)
CALCIUM SERPL-MCNC: 9 MG/DL (ref 8.8–10.2)
CHLORIDE SERPL-SCNC: 97 MMOL/L (ref 98–107)
CREAT SERPL-MCNC: 2.13 MG/DL (ref 0.51–0.95)
DEPRECATED HCO3 PLAS-SCNC: 26 MMOL/L (ref 22–29)
GFR SERPL CREATININE-BSD FRML MDRD: 24 ML/MIN/1.73M2
GLUCOSE SERPL-MCNC: 281 MG/DL (ref 70–99)
POTASSIUM SERPL-SCNC: 4.3 MMOL/L (ref 3.4–5.3)
SODIUM SERPL-SCNC: 135 MMOL/L (ref 136–145)

## 2022-12-28 PROCEDURE — 80048 BASIC METABOLIC PNL TOTAL CA: CPT | Performed by: PHYSICIAN ASSISTANT

## 2023-01-06 ENCOUNTER — TRANSCRIBE ORDERS (OUTPATIENT)
Dept: OTHER | Age: 68
End: 2023-01-06

## 2023-01-06 ENCOUNTER — MEDICAL CORRESPONDENCE (OUTPATIENT)
Dept: HEALTH INFORMATION MANAGEMENT | Facility: CLINIC | Age: 68
End: 2023-01-06

## 2023-01-06 DIAGNOSIS — N18.4 STAGE 4 CHRONIC KIDNEY DISEASE (H): Primary | ICD-10-CM

## 2023-01-17 ENCOUNTER — MEDICAL CORRESPONDENCE (OUTPATIENT)
Dept: HEALTH INFORMATION MANAGEMENT | Facility: CLINIC | Age: 68
End: 2023-01-17
Payer: COMMERCIAL

## 2023-01-23 ENCOUNTER — TRANSFERRED RECORDS (OUTPATIENT)
Dept: HEALTH INFORMATION MANAGEMENT | Facility: CLINIC | Age: 68
End: 2023-01-23

## 2023-01-23 LAB — RETINOPATHY: POSITIVE

## 2023-01-27 ENCOUNTER — TRANSFERRED RECORDS (OUTPATIENT)
Dept: HEALTH INFORMATION MANAGEMENT | Facility: CLINIC | Age: 68
End: 2023-01-27
Payer: COMMERCIAL

## 2023-02-01 ENCOUNTER — OFFICE VISIT (OUTPATIENT)
Dept: CARDIOLOGY | Facility: CLINIC | Age: 68
End: 2023-02-01
Payer: COMMERCIAL

## 2023-02-01 VITALS
RESPIRATION RATE: 16 BRPM | BODY MASS INDEX: 34.19 KG/M2 | SYSTOLIC BLOOD PRESSURE: 132 MMHG | DIASTOLIC BLOOD PRESSURE: 71 MMHG | WEIGHT: 193 LBS | HEART RATE: 62 BPM

## 2023-02-01 DIAGNOSIS — H34.239 BRANCH RETINAL ARTERY OCCLUSION, UNSPECIFIED LATERALITY: ICD-10-CM

## 2023-02-01 DIAGNOSIS — I49.1 PAC (PREMATURE ATRIAL CONTRACTION): ICD-10-CM

## 2023-02-01 DIAGNOSIS — R00.2 PALPITATIONS: ICD-10-CM

## 2023-02-01 DIAGNOSIS — I50.32 CHRONIC HEART FAILURE WITH PRESERVED EJECTION FRACTION (HFPEF) (H): Primary | ICD-10-CM

## 2023-02-01 DIAGNOSIS — I51.89 DIASTOLIC DYSFUNCTION: ICD-10-CM

## 2023-02-01 DIAGNOSIS — I11.0 LVH (LEFT VENTRICULAR HYPERTROPHY) DUE TO HYPERTENSIVE DISEASE, WITH HEART FAILURE (H): ICD-10-CM

## 2023-02-01 PROCEDURE — 99215 OFFICE O/P EST HI 40 MIN: CPT | Performed by: INTERNAL MEDICINE

## 2023-02-01 PROCEDURE — 99417 PROLNG OP E/M EACH 15 MIN: CPT | Performed by: INTERNAL MEDICINE

## 2023-02-01 RX ORDER — ACETAMINOPHEN 160 MG
1 TABLET,DISINTEGRATING ORAL DAILY
Status: ON HOLD | COMMUNITY
Start: 2023-01-27 | End: 2024-05-07

## 2023-02-01 RX ORDER — FUROSEMIDE 40 MG
TABLET ORAL 2 TIMES DAILY
COMMUNITY
Start: 2023-01-17

## 2023-02-01 RX ORDER — EMPAGLIFLOZIN 10 MG/1
TABLET, FILM COATED ORAL
COMMUNITY
Start: 2023-01-30 | End: 2023-03-08

## 2023-02-01 RX ORDER — BLOOD SUGAR DIAGNOSTIC
STRIP MISCELLANEOUS
COMMUNITY
Start: 2022-12-26

## 2023-02-01 RX ORDER — INSULIN ASPART 100 [IU]/ML
8-15 INJECTION, SOLUTION INTRAVENOUS; SUBCUTANEOUS
COMMUNITY
Start: 2023-01-02

## 2023-02-01 NOTE — LETTER
2/1/2023    ALEAH Justice  [address] [place] [address]    RE: Mariama Keene       Dear Colleague,     I had the pleasure of seeing Mariama Keene in the [facility].    HEART CARE ENCOUNTER CONSULTATON NOTE      [facility]  [phone]      Assessment/Recommendations   Assessment:   1.  Chronic congestive heart failure secondary to heart failure with preserved ejection fraction with left ventricular hypertrophy related to hypertension.  Well compensated today  2. Hypertension, improved  3. HLD, patient was off statin.  4. DM type II, uncontrolled.   Lab Results   Component Value Date    A1C 10.2 11/28/2022    A1C 10.3 08/17/2020     5. History of premature atrial contractions  6. CKD stage IV (GFR (24).     7. Asthma     Plan:   1.  Continue Lasix as prescribed per nephrology.  2.  Patient would benefit from Jardiance, nephrology going to follow labs closely with starting this medication.  3. Continue hydrochlorothiazide, amlodipine, and metoprolol for HTN  4.  Branch retinal artery occlusion, source is unclear at this time.  She underwent carotid artery ultrasound at [facility] ordered by her primary care provider which I do not have records (will request).  Recommend a 30-day cardiac monitor to rule out paroxysmal atrial fibrillation as a possible cause.    5.  Recommend starting aspirin therapy given retinal artery occlusion with unknown source.  6.  Recommend continuation of statin therapy.  Consider moderate intensity statin, Crestor 20 mg daily       History of Present Illness/Subjective    HPI: Mariama Keene is a 67 year old female with chronic kidney disease stage IV, diabetes mellitus which is uncontrolled, hypertension which has been uncontrolled for some time, asthma who presents to cardiology clinic in consultation for heart failure with preserved ejection fraction.    Patient presents today in clinic very anxious about recent diagnosis of congestive heart  failure, retinal artery occlusion, kidney disease and elevation in her blood pressure.    After an extensive conversation with the patient regarding her echo findings and diagnosis of congestive heart failure with preserved ejection fraction the patient's anxiety level improved.  Her blood pressure has considerably improved with recent adjustments in medications after hospitalization for acute hypoxic respiratory failure in the setting of asthma with RSV.    She currently has no significant lower extremity edema.  She is currently on a stable dose of Lasix.  Renal function has stabilized.  She she was evaluated by nephrology who is recommending starting Jardiance.  We will again need to be very careful about use of Jardiance with hydrochlorothiazide and Lasix.  Plan to watch very carefully per nephrology.    She was seen and evaluated by eye specialist and was noted to have a branch occlusion of her retinal artery.  This prompted great concern from the ophthalmologist that she needed immediate evaluation.  No therapies were recommended.  Currently not on aspirin.  She did undergo a ultrasound of her carotids but unfortunately do not have results from [facility].  We will request.  No bruit heard today on examination.  This was reviewed with the patient in detail.    She does have a history of hyperlipidemia.  She was not taking her statin therapy prior to her hospitalization recently.  Her cholesterol generally in the past has been well controlled with statin therapy.  Cholesterol was elevated off her statin to around an LDL of 140.  Need to target LDL to less than 70 given risk factors.    Her diabetes is not controlled.  Her A1c is over 10.    Echocardiogram: 2022  Normal left ventricular size. Mild to moderate left ventricular hypertrophy.  Left ventricular function appears normal. Left ventricular ejection fraction  estimated 55 to 60%.Diastolic function appears abnormal.  Normal right ventricular size and systolic  function.  Mild left atrial enlargement  No hemodynamically significant valve abnormality         Physical Examination  Review of Systems   Vitals: /71 (BP Location: Left arm, Patient Position: Sitting, Cuff Size: Adult Large)   Pulse 62   Resp 16   Wt 87.5 kg (193 lb)   BMI 34.19 kg/m    BMI= Body mass index is 34.19 kg/m .  Wt Readings from Last 3 Encounters:   02/01/23 87.5 kg (193 lb)   11/28/22 86.2 kg (190 lb)   06/29/22 88.5 kg (195 lb)        Anxious but pleasant.   ENT/Mouth: membranes moist, no oral lesions or bleeding gums.      EYES:  no scleral icterus, normal conjunctivae       Chest/Lungs:   lungs are clear to auscultation, no rales or wheezing, no sternal scar, equal chest wall expansion    Cardiovascular:   Regular. Normal first and second heart sounds with no murmurs, rubs, or gallops; the carotid, radial  pulses are intact, Jugular venous pressure normal, mild pitting edema bilaterally    Abdomen:  no tenderness; bowel sounds are present   Extremities: no cyanosis or clubbing   Skin: no xanthelasma, warm.    Neurologic: normal  bilateral, no tremors     Psychiatric: alert and oriented x3, calm        Please refer above for cardiac ROS details.        Medical History  Surgical History Family History Social History   Past Medical History:   Diagnosis Date     Asthma      Asthma      Asthma      Diabetes (H)      Diabetes (H)      Diabetes (H)      Hypertension      Hypertension      Hypertension      Panic attacks      Panic attacks      Panic attacks      Past Surgical History:   Procedure Laterality Date     ABSCESS DRAINAGE      On back     HYSTERECTOMY      age 33 - partial     NEPHRECTOMY PARTIAL Right      OOPHORECTOMY      age 48     Family History   Problem Relation Age of Onset     Breast Cancer Maternal Aunt 45.00     Alzheimer Disease Mother      Hypertension Mother      Chronic Obstructive Pulmonary Disease Father         Social History     Socioeconomic History     Marital  status:      Spouse name: Not on file     Number of children: Not on file     Years of education: Not on file     Highest education level: Not on file   Occupational History     Not on file   Tobacco Use     Smoking status: Former     Types: Cigarettes     Quit date: 1992     Years since quittin.1     Smokeless tobacco: Never   Substance and Sexual Activity     Alcohol use: Yes     Drug use: Never     Sexual activity: Not Currently     Birth control/protection: Surgical   Other Topics Concern     Not on file   Social History Narrative     Not on file     Social Determinants of Health     Financial Resource Strain: Not on file   Food Insecurity: Not on file   Transportation Needs: Not on file   Physical Activity: Not on file   Stress: Not on file   Social Connections: Not on file   Intimate Partner Violence: Not on file   Housing Stability: Not on file           Medications  Allergies   Current Outpatient Medications   Medication Sig Dispense Refill     albuterol (PROAIR HFA;PROVENTIL HFA;VENTOLIN HFA) 90 mcg/actuation inhaler [ALBUTEROL (PROAIR HFA;PROVENTIL HFA;VENTOLIN HFA) 90 MCG/ACTUATION INHALER] Inhale 2 puffs every 4 (four) hours as needed for wheezing (bronchospasm). 1 Inhaler 0     amLODIPine (NORVASC) 10 MG tablet Take 1 tablet (10 mg) by mouth daily 30 tablet 0     azithromycin (ZITHROMAX) 250 MG tablet Take 1 tablet (250 mg) by mouth daily 1 tablet 0     BD PEN NEEDLE ALEJANDRO 2ND GEN 32G X 4 MM miscellaneous USE THREE TIMES DAILY WITH NOVOLOG.       hydrochlorothiazide (MICROZIDE) 12.5 MG capsule Take 2 capsules (25 mg) by mouth daily 60 capsule 0     Lancets (ONETOUCH DELICA PLUS RUFMOD61S) MISC TEST THREE TIMES DAILY       levothyroxine (SYNTHROID/LEVOTHROID) 100 MCG tablet Take 1 tablet (100 mcg) by mouth daily 30 tablet 0     metoprolol tartrate (LOPRESSOR) 50 MG tablet [METOPROLOL TARTRATE (LOPRESSOR) 50 MG TABLET] Take 2 tablets (100 mg total) by mouth 2 (two) times a day. 60 tablet 0      omeprazole (PRILOSEC) 20 MG capsule [OMEPRAZOLE (PRILOSEC) 20 MG CAPSULE] Take 1 capsule (20 mg total) by mouth daily before breakfast. 30 capsule 0     rosuvastatin (CRESTOR) 5 MG tablet [ROSUVASTATIN (CRESTOR) 5 MG TABLET] Take 1 tablet (5 mg total) by mouth daily. 30 tablet 0     TRESIBA FLEXTOUCH 100 UNIT/ML pen ADMINISTER 48 UNITS UNDER THE SKIN EVERY DAY         Allergies   Allergen Reactions     Alprazolam Other (See Comments)     Cephalexin Rash and Swelling     Exenatide Other (See Comments)     Hydralazine      Flush and Elevated BP      Iodinated Contrast Media [Diagnostic X-Ray Materials] Unknown     Lisinopril Other (See Comments)     Nitrofurantoin Monohyd/M-Cryst [Nitrofurantoin] Rash     Sulfa (Sulfonamide Antibiotics) [Sulfa Drugs] Other (See Comments)          Lab Results    Chemistry/lipid CBC Cardiac Enzymes/BNP/TSH/INR   Recent Labs   Lab Test 12/08/22  0937   CHOL 238*   HDL 63   *   TRIG 191*     Recent Labs   Lab Test 12/08/22  0937 03/16/22  1011 07/14/21  1152   * 58 74     Recent Labs   Lab Test 12/28/22  1207   *   POTASSIUM 4.3   CHLORIDE 97*   CO2 26   *   BUN 36.5*   CR 2.13*   GFRESTIMATED 24*   BILL 9.0     Recent Labs   Lab Test 12/28/22  1207 12/06/22  1135 12/04/22  0624   CR 2.13* 2.00* 1.81*     Recent Labs   Lab Test 11/28/22  0835 08/17/20  1106   A1C 10.2* 10.3*          Recent Labs   Lab Test 12/04/22  0624 12/01/22  0658 11/29/22  0640   WBC  --   --  5.6   HGB  --   --  14.2   HCT  --   --  41.7   MCV  --   --  91      < > 157    < > = values in this interval not displayed.     Recent Labs   Lab Test 11/29/22  0640 11/28/22  0835 06/17/22  0130   HGB 14.2 13.2 14.2    Recent Labs   Lab Test 06/17/22  0130 08/19/20  1407 08/17/20  1106   TROPONINI 0.02 0.03 0.03     Recent Labs   Lab Test 11/28/22  0713   NTBNPI 2,370*     Recent Labs   Lab Test 12/06/22  1135   TSH 3.09     Recent Labs   Lab Test 11/28/22  0920 08/20/20  1026  08/19/20  1407   INR 1.00 1.03 0.93        Lewis Trinidad DO    Today's clinic visit entailed:  77 minutes spent on the date of the encounter doing chart review, history and exam, documentation and further activities per the note.         Thank you for allowing me to participate in the care of your patient.      Sincerely,     Lewis Trinidad DO     [facility]  cc:   No referring provider defined for this encounter.

## 2023-02-03 ENCOUNTER — HOSPITAL ENCOUNTER (OUTPATIENT)
Dept: CARDIOLOGY | Facility: HOSPITAL | Age: 68
Discharge: HOME | End: 2023-02-03
Attending: INTERNAL MEDICINE | Admitting: INTERNAL MEDICINE
Payer: COMMERCIAL

## 2023-02-03 ENCOUNTER — TELEPHONE (OUTPATIENT)
Dept: CARDIOLOGY | Facility: CLINIC | Age: 68
End: 2023-02-03
Payer: COMMERCIAL

## 2023-02-03 DIAGNOSIS — I49.1 PAC (PREMATURE ATRIAL CONTRACTION): ICD-10-CM

## 2023-02-03 DIAGNOSIS — R00.2 PALPITATIONS: ICD-10-CM

## 2023-02-03 DIAGNOSIS — H34.239 BRANCH RETINAL ARTERY OCCLUSION, UNSPECIFIED LATERALITY: ICD-10-CM

## 2023-02-03 PROCEDURE — 93270 REMOTE 30 DAY ECG REV/REPORT: CPT

## 2023-02-03 NOTE — TELEPHONE ENCOUNTER
PC to [facility] requesting carotid US. Will fax over to [phone]. Will have STAT scanned into EHR once made available. LUISRn

## 2023-02-03 NOTE — TELEPHONE ENCOUNTER
Incoming fax and receipt of Carotid US results. Sent via fax to [facility] stat scan. Await for records to populate into EHR. Rachell SMITH Dr[address] carotid US report now available under imaging tab.Please review. Any further recommendations post-review? CMM,Rn

## 2023-02-03 NOTE — TELEPHONE ENCOUNTER
----- Message from Lewis Trinidad DO sent at 2/1/2023  2:55 PM CST -----  I need to carotid ultrasound results ([facility]) ordered by PCP.

## 2023-02-06 NOTE — TELEPHONE ENCOUNTER
PC to patient and review of response from provider. Pt appreciative and relieved. No further questions at this time. LUIS,Rn

## 2023-02-06 NOTE — TELEPHONE ENCOUNTER
==View-only below this line===  ----- Message -----  From: Lewis Trinidad DO  Sent: 2/3/2023   4:13 PM CST  To: Benjamin Lynch RN    Carotid Ultrasound is normal based on report.  Continue with plan for cardiac monitor.      Thanks,     Abundio

## 2023-02-08 ENCOUNTER — TRANSFERRED RECORDS (OUTPATIENT)
Dept: HEALTH INFORMATION MANAGEMENT | Facility: CLINIC | Age: 68
End: 2023-02-08
Payer: COMMERCIAL

## 2023-02-08 LAB — HBA1C MFR BLD: 8.5 % (ref 4.2–6.1)

## 2023-03-03 ENCOUNTER — OFFICE VISIT (OUTPATIENT)
Dept: PHARMACY | Facility: PHYSICIAN GROUP | Age: 68
End: 2023-03-03
Payer: COMMERCIAL

## 2023-03-03 VITALS — SYSTOLIC BLOOD PRESSURE: 136 MMHG | DIASTOLIC BLOOD PRESSURE: 72 MMHG

## 2023-03-03 DIAGNOSIS — K21.9 GASTROESOPHAGEAL REFLUX DISEASE WITHOUT ESOPHAGITIS: ICD-10-CM

## 2023-03-03 DIAGNOSIS — Z79.4 INSULIN DEPENDENT TYPE 2 DIABETES MELLITUS (H): Primary | ICD-10-CM

## 2023-03-03 DIAGNOSIS — E03.9 HYPOTHYROIDISM, UNSPECIFIED TYPE: ICD-10-CM

## 2023-03-03 DIAGNOSIS — J45.30 MILD PERSISTENT ASTHMA WITHOUT COMPLICATION: ICD-10-CM

## 2023-03-03 DIAGNOSIS — I10 ESSENTIAL HYPERTENSION: ICD-10-CM

## 2023-03-03 DIAGNOSIS — Z78.9 TAKES DIETARY SUPPLEMENTS: ICD-10-CM

## 2023-03-03 DIAGNOSIS — E11.9 INSULIN DEPENDENT TYPE 2 DIABETES MELLITUS (H): Primary | ICD-10-CM

## 2023-03-03 DIAGNOSIS — N18.4 CKD (CHRONIC KIDNEY DISEASE) STAGE 4, GFR 15-29 ML/MIN (H): ICD-10-CM

## 2023-03-03 PROCEDURE — 99607 MTMS BY PHARM ADDL 15 MIN: CPT | Performed by: PHARMACIST

## 2023-03-03 PROCEDURE — 99605 MTMS BY PHARM NP 15 MIN: CPT | Performed by: PHARMACIST

## 2023-03-03 NOTE — LETTER
_  Medication List        Prepared on: Mar 3, 2023     Bring your Medication List when you go to the doctor, hospital, or   emergency room. And, share it with your family or caregivers.     Note any changes to how you take your medications.  Cross out medications when you no longer use them.    Medication How I take it Why I use it Prescriber   albuterol (PROAIR HFA/PROVENTIL HFA/VENTOLIN HFA) 108 (90 Base) MCG/ACT inhaler Inhale 2 puffs into the lungs every 4 hours as needed for shortness of breath, wheezing or cough Asthma ALEAH Connor   amLODIPine (NORVASC) 5 MG tablet Take 5 mg by mouth daily High Blood Pressure Disorder ALEAH Connor   aspirin (ASA) 81 MG EC tablet Take 1 tablet (81 mg) by mouth daily Branch retinal artery occlusion Lewis Trinidad DO   Cholecalciferol (VITAMIN D3) 50 MCG (2000 UT) CAPS Take 2 capsules by mouth daily General Health Patient Reported   Continuous Blood Gluc Sensor (FREESTYLE TEMI 2 SENSOR) MISC Change every 14 days. Type 2 Diabetes ALEAH Connor   furosemide (LASIX) 40 MG tablet Take 1 tablet by mouth daily Edema; Kidney Disease Mahsa Teixeira PA-C   hydrochlorothiazide (HYDRODIURIL) 12.5 MG tablet Take 25 mg by mouth daily Edema; High Blood Pressure Disorder ALEAH Connor   levothyroxine (SYNTHROID/LEVOTHROID) 100 MCG tablet Take 1 tablet (100 mcg) by mouth daily  Hypothyroidism ALEAH Connor   metoprolol tartrate (LOPRESSOR) 50 MG tablet  Take 2 tablets (100 mg total) by mouth 2 (two) times a day. Essential Hypertension, Benign ALEAH Connor   NOVOLOG FLEXPEN 100 UNIT/ML soln Inject 8 Units Subcutaneous 3 times daily (with meals) , plus sliding scale Type 2 Diabetes ALEAH Connor   omeprazole (PRILOSEC) 20 MG capsule [OMEPRAZOLE (PRILOSEC) 20 MG CAPSULE] Take 1 capsule (20 mg total) by mouth daily before breakfast. Gastroesophageal Reflux Disease  ALEAH Connor   rosuvastatin (CRESTOR) 5 MG tablet Take 1 tablet (5 mg total) by mouth daily. High  Cholester ALEAH Connor   TRESIBA FLEXTOUCH 100 UNIT/ML pen Inject 52 Units Subcutaneous daily Type 2 Diabetes ALEAH Connor         Add new medications, over-the-counter drugs, herbals, vitamins, or  minerals in the blank rows below.    Medication How I take it Why I use it Prescriber                                      Allergies:      alprazolam; cephalexin; exenatide; hydralazine; iodinated contrast media [diagnostic x-ray materials]; lisinopril; nitrofurantoin monohyd/m-cryst [nitrofurantoin]; sulfa (sulfonamide antibiotics) [sulfa drugs]        Side effects I have had:               Other Information:              My notes and questions:

## 2023-03-03 NOTE — LETTER
"Recommended To-Do List      Prepared on: Mar 3, 2023       You can get the best results from your medications by completing the items on this \"To-Do List.\"      Bring your To-Do List when you go to your doctor. And, share it with your family or caregivers.    My To-Do List:  What we talked about: What I should do:   The importance of taking your medication as intended    Education: Start using Freestyle Ursula 2 continuous glucose monitor. Change sensor every 14 days.          What we talked about: What I should do:    What my medicines are for, how to know if my medicines are working, made sure my medicines are safe for me and reviewed how to take my medicines.      Take my medicines every day                  "

## 2023-03-03 NOTE — PROGRESS NOTES
Medication Therapy Management (MTM) Encounter    ASSESSMENT:                            Medication Adherence/Access: No issues identified    Type 2 Diabetes: A1c is not at goal of <7.5% (goal based on age and ckd). She would benefit from starting continuous glucose monitoring to help adjust insulin dose. May consider discussing GLP-1 agonist in the future once we have more of her home readings.   CGM-specific education: Freestyle Ursula 2 reader and sensor     Patient verbalized understanding of concepts discussed and recommendations provided today.  Patient self-inserted Ursula 2 sensor correctly with no resistance or bleeding at insertion site during visit.    Hypertension, Chronic kidney disease: Blood pressure is slightly above goal of <130/80 mmHg today but previous blood pressure was at goal. Follow-up plan in place with nephrology. She may benefit from switching hydrochlorothiazide and metoprolol to higher strength tablets to reduce pill burden, no change in dose.     Hyperlipidemia: Patient is on moderate intensity statin which is indicated based on 2019 ACC/AHA guidelines for lipid management. She may benefit from increasing dose if LDL remains elevated in the future but previous LDL was at goal of <70 mg/dL.     Hypothyroidism:Stable. Last TSH is within normal limits.     GERD: Stable.     Asthma: Stable per patient. No recent use of as needed inhalers.     Supplements: Stable.     PLAN:                            1. Start using Freestyle Ursula 2 continuous glucose monitor.   2. Sent orders for higher tablet strength of metoprolol and hydrochlorothiazide to decrease numbers of pills you need to take    Follow-up: by phone on 3/17/2023 at 9:00 AM    SUBJECTIVE/OBJECTIVE:                          Mariama Keene is a 67 year old female coming in for an initial visit. She was referred to me from Karissa Gaffney PA-C.      Reason for visit: New CGM start.    Allergies/ADRs: Reviewed in chart  Past Medical History:  Reviewed in chart  Tobacco: She reports that she quit smoking about 31 years ago. Her smoking use included cigarettes. She has never used smokeless tobacco.  Alcohol: occasional- monthly or less      Medication Adherence/Access: no issues reported    Type 2 Diabetes:   Tresiba 52 units once daily  Novolog 8 units plus sliding scale three times days   She is here today to get started with Freestyle Ursula 2 continuous glucose monitor.   Jardiance was helping but she had to stop it due to kidneys about a month ago.   Farxiga was not approved by her insurance.   Blood Sugar Ranges (patient reported): 121 today, some mornings in 200's, over 200 occasionally  Symptoms of low blood sugar? Funny feeling, shaking, sweating- 40 sometime in Dec had some 50-52s but this hasn't been happening.   Symptoms of high blood sugar? none  Eye exam: up to date  Foot exam: up to date  Aspirin: Taking 81mg daily and denies side effects   Statin: rosuvastatin 5 mg and denies side effects  ACEi/ARB: No- follows with nephrology.          Latest Reference Range & Units Most Recent   Microalbumin Urine mg/g Cr <=19.9 mg/g 3,472.9 (H)  6/18/20 09:03   (H): Data is abnormally high      Hypertension, Chronic kidney disease:   Hydrochlorothiazide 25 mg (2x 12.5 mg) once daily  Amlodipine 5 mg daily  Metoprolol tartrate 100 mg (2x 50 mg) twice a day  Furosemide 40 mg once daily   Follows with Lorena BULLARD at [facility]. Patient does not self-monitor blood pressure.    Patient reports no current medication side effects.        Creatinine   Date Value Ref Range Status   12/28/2022 2.13 (H) 0.51 - 0.95 mg/dL Final     GFR Estimate   Date Value Ref Range Status   12/28/2022 24 (L) >60 mL/min/1.73m2 Final     Comment:     Effective December 21, 2021 eGFRcr in adults is calculated using the 2021 CKD-EPI creatinine equation which includes age and gender (Jinny powell al., NEJM, DOI: 10.1056/XFXFkb2633842)   04/07/2021 32 (L) >60 mL/min/1.73m2 Final      Potassium   Date Value Ref Range Status   12/28/2022 4.3 3.4 - 5.3 mmol/L Final   06/17/2022 4.0 3.5 - 5.0 mmol/L Final       Hyperlipidemia:   rosuvastatin 5 mg daily  Patient reports no significant myalgias or other side effects.  Recent Labs   Lab Test 12/08/22  0937 03/16/22  1011   CHOL 238* 151   HDL 63 42*   * 58   TRIG 191* 255*     The 10-year ASCVD risk score (Nestor DORADO, et al., 2019) is: 23.8%    Values used to calculate the score:      Age: 67 years      Sex: Female      Is Non- : No      Diabetic: Yes      Tobacco smoker: No      Systolic Blood Pressure: 136 mmHg      Is BP treated: Yes      HDL Cholesterol: 63 mg/dL      Total Cholesterol: 238 mg/dL    Hypothyroidism:  Levothyroxine 100 mcg daily  Patient is having the following symptoms: none.   TSH   Date Value Ref Range Status   12/06/2022 3.09 0.30 - 4.20 uIU/mL Final   03/16/2022 5.88 (H) 0.30 - 5.00 uIU/mL Final     GERD:   Prilosec (omeprazole) 20 mg 1-2 times daily  Patient reports no current symptoms.   The patient does notice symptoms if they miss a dose.   Patient feels that current regimen is effective.    Asthma:   Qvar as needed - as needed   Albuterol as needed   She has not needed to use inhalers lately. She only uses Qvar as needed. No issues reported.     Supplements:   Vitamin D3 4000 units daily (2x 2000 units)  No reported issues at this time.       Today's Vitals: /72 (BP Location: Right arm, Patient Position: Sitting, Cuff Size: Adult Large)   ----------------      I spent 64 minutes with this patient today. All changes were made via collaborative practice agreement with ALEAH Justice. A copy of the visit note was provided to the patient's provider(s).    A summary of these recommendations was sent via [employer].    Alia Waite, PharmD, BCACP  Medication Therapy Management Pharmacist  [facility]  Pager: [phone]      Telemedicine Visit Details  Type of service:   Telephone visit  Start Time: 9:22 AM  End Time: 10:26 AM     Medication Therapy Recommendations  Insulin dependent type 2 diabetes mellitus (H)    Current Medication: Continuous Blood Gluc Sensor (FREESTYLE TEMI 2 SENSOR) MISC   Rationale: Does not understand instructions - Adherence - Adherence   Recommendation: Provide Education   Status: Patient Agreed - Adherence/Education

## 2023-03-03 NOTE — LETTER
March 8, 2023  Mariama Keene  [address]  [place] [address]    Dear Ms. Keene, [facility]     Thank you for talking with me on Mar 3, 2023 about your health and medications. As a follow-up to our conversation, I have included two documents:      1. Your Recommended To-Do List has steps you should take to get the best results from your medications.  2. Your Medication List will help you keep track of your medications and how to take them.    If you want to talk about these documents, please call Alia Waite [facility] at phone: [phone], Monday-Friday 8-4:30pm.    I look forward to working with you and your doctors to make sure your medications work well for you.    Sincerely,  Alia Waite, PharmD  [facility] Pharmacist, [facility]

## 2023-03-06 PROCEDURE — 93272 ECG/REVIEW INTERPRET ONLY: CPT | Performed by: INTERNAL MEDICINE

## 2023-03-08 RX ORDER — ALBUTEROL SULFATE 90 UG/1
2 AEROSOL, METERED RESPIRATORY (INHALATION) EVERY 4 HOURS PRN
COMMUNITY

## 2023-03-08 RX ORDER — AMLODIPINE BESYLATE 5 MG/1
5 TABLET ORAL DAILY
COMMUNITY

## 2023-03-08 RX ORDER — HYDROCHLOROTHIAZIDE 12.5 MG/1
25 TABLET ORAL DAILY
COMMUNITY
End: 2023-12-14

## 2023-03-08 NOTE — PATIENT INSTRUCTIONS
"Recommendations from today's MTM visit:                                                       1. Start using Freestyle Ursula 2 continuous glucose monitor.   2. Sent orders for higher tablet strength of metoprolol and hydrochlorothiazide to decrease numbers of pills you need to take     Follow-up: by phone on 3/17/2023 at 9:00 AM    It was great speaking with you today.  I value your experience and would be very thankful for your time in providing feedback in our clinic survey. In the next few days, you may receive an email or text message from [employer] with a link to a survey related to your  clinical pharmacist.\"     To schedule another MTM appointment, please call the MTM scheduling line at [phone].    My Clinical Pharmacist's contact information:                                                      Please feel free to contact me with any questions or concerns you have.      Alia Waite, PharmD, BCACP  Medication Therapy Management Pharmacist  [facility]  Pager: [phone]         "

## 2023-03-13 ENCOUNTER — ALLIED HEALTH/NURSE VISIT (OUTPATIENT)
Dept: PULMONOLOGY | Facility: CLINIC | Age: 68
End: 2023-03-13
Attending: INTERNAL MEDICINE
Payer: COMMERCIAL

## 2023-03-13 DIAGNOSIS — J45.21 MILD INTERMITTENT ASTHMA WITH (ACUTE) EXACERBATION: ICD-10-CM

## 2023-03-13 LAB
DLCOCOR-%PRED-PRE: 90 %
DLCOCOR-PRE: 17 ML/MIN/MMHG
DLCOUNC-%PRED-PRE: 90 %
DLCOUNC-PRE: 17.1 ML/MIN/MMHG
DLCOUNC-PRED: 18.88 ML/MIN/MMHG
ERV-%PRED-PRE: 87 %
ERV-PRE: 0.31 L
ERV-PRED: 0.36 L
EXPTIME-PRE: 6.25 SEC
FEF2575-%PRED-PRE: 123 %
FEF2575-PRE: 2.18 L/SEC
FEF2575-PRED: 1.77 L/SEC
FEFMAX-%PRED-PRE: 105 %
FEFMAX-PRE: 5.89 L/SEC
FEFMAX-PRED: 5.57 L/SEC
FEV1-%PRED-PRE: 92 %
FEV1-PRE: 1.88 L
FEV1FEV6-PRE: 84 %
FEV1FEV6-PRED: 79 %
FEV1FVC-PRE: 84 %
FEV1FVC-PRED: 79 %
FEV1SVC-PRE: 72 %
FEV1SVC-PRED: 68 %
FIFMAX-PRE: 5.22 L/SEC
FRCPLETH-%PRED-PRE: 79 %
FRCPLETH-PRE: 2.11 L
FRCPLETH-PRED: 2.65 L
FVC-%PRED-PRE: 86 %
FVC-PRE: 2.22 L
FVC-PRED: 2.56 L
HGB BLD-MCNC: 13.6 G/DL
IC-%PRED-PRE: 88 %
IC-PRE: 2.31 L
IC-PRED: 2.6 L
RVPLETH-%PRED-PRE: 90 %
RVPLETH-PRE: 1.8 L
RVPLETH-PRED: 2 L
TLCPLETH-%PRED-PRE: 92 %
TLCPLETH-PRE: 4.42 L
TLCPLETH-PRED: 4.77 L
VA-%PRED-PRE: 89 %
VA-PRE: 4.04 L
VC-%PRED-PRE: 88 %
VC-PRE: 2.62 L
VC-PRED: 2.96 L

## 2023-03-13 PROCEDURE — 94375 RESPIRATORY FLOW VOLUME LOOP: CPT | Performed by: INTERNAL MEDICINE

## 2023-03-13 PROCEDURE — 94726 PLETHYSMOGRAPHY LUNG VOLUMES: CPT | Performed by: INTERNAL MEDICINE

## 2023-03-13 PROCEDURE — 85018 HEMOGLOBIN: CPT

## 2023-03-13 PROCEDURE — 94729 DIFFUSING CAPACITY: CPT | Performed by: INTERNAL MEDICINE

## 2023-03-15 ENCOUNTER — OFFICE VISIT (OUTPATIENT)
Dept: PULMONOLOGY | Facility: CLINIC | Age: 68
End: 2023-03-15
Attending: INTERNAL MEDICINE
Payer: COMMERCIAL

## 2023-03-15 VITALS
WEIGHT: 190 LBS | SYSTOLIC BLOOD PRESSURE: 134 MMHG | HEIGHT: 63 IN | HEART RATE: 67 BPM | BODY MASS INDEX: 33.66 KG/M2 | DIASTOLIC BLOOD PRESSURE: 66 MMHG | OXYGEN SATURATION: 98 %

## 2023-03-15 DIAGNOSIS — J45.21 MILD INTERMITTENT ASTHMA WITH ACUTE EXACERBATION: ICD-10-CM

## 2023-03-15 PROCEDURE — 99214 OFFICE O/P EST MOD 30 MIN: CPT | Performed by: INTERNAL MEDICINE

## 2023-03-15 ASSESSMENT — ASTHMA QUESTIONNAIRES
HOSPITALIZATION_OVERNIGHT_LAST_YEAR_TOTAL: ONE
ACT_TOTALSCORE: 20
EMERGENCY_ROOM_LAST_YEAR_TOTAL: ONE
ACT_TOTALSCORE: 20
QUESTION_2 LAST FOUR WEEKS HOW OFTEN HAVE YOU HAD SHORTNESS OF BREATH: ONCE OR TWICE A WEEK
QUESTION_3 LAST FOUR WEEKS HOW OFTEN DID YOUR ASTHMA SYMPTOMS (WHEEZING, COUGHING, SHORTNESS OF BREATH, CHEST TIGHTNESS OR PAIN) WAKE YOU UP AT NIGHT OR EARLIER THAN USUAL IN THE MORNING: ONCE OR TWICE
QUESTION_1 LAST FOUR WEEKS HOW MUCH OF THE TIME DID YOUR ASTHMA KEEP YOU FROM GETTING AS MUCH DONE AT WORK, SCHOOL OR AT HOME: NONE OF THE TIME
QUESTION_5 LAST FOUR WEEKS HOW WOULD YOU RATE YOUR ASTHMA CONTROL: SOMEWHAT CONTROLLED
QUESTION_4 LAST FOUR WEEKS HOW OFTEN HAVE YOU USED YOUR RESCUE INHALER OR NEBULIZER MEDICATION (SUCH AS ALBUTEROL): ONCE A WEEK OR LESS

## 2023-03-15 NOTE — PATIENT INSTRUCTIONS
1) You dont have COPD on the breathing test  2) You have mild intermittent asthma based on your history  3) You should use QVAR in the spring before you get sick every year, then you can stop by summer.  You may also start using it if you get a head cold to keep it from impacting your breathing too  4) Use albuterol as needed all year long to control intermittent wheezing and tightness  5) A nasal steroid like flonase or nasacort could help open up your nose a bit if you get started now. These take a while to kick in but can be useful in allergy season

## 2023-03-15 NOTE — PROGRESS NOTES
Assessment and Plan:Mariama Keene is a 67 year old female with a past medical history significant for intermittent asthma who presents to clinic today for hospital follow up of an asthma exacerbation.  She has a fairly straightforward history of intermittent asthma exacerbated seasonally and by colds.  She would do well with seasonal use of an intermittent steroid to help dampen flares when they happen or possibly abort them all together.  Her flares are not often enough to warrant year long therapy with an ICS or singulair.   1. Mild intermittent asthma.  PFTs normal.  Start QVAR 80 1puff bid to control asthma symptoms every spring.  Can also start this with initiation of a head cold to prevent asthma flares.  As needed albuterol MDI all year long.  2. Rhinitis - consider nasal steroid seasonally as well to better control the post nasal drip that could drive her asthma.  3. Tobacco abuse - prior heavy smoker, quit in 1992.  No evidence of COPD on PFTs.  4. RTC PRN - can follow with primary for asthma control      CCx: asthma    HPI: Ms. Patel is a 67 year old female who was recently admitted to [facility] with RSV and a severe asthma exacerbation in Nov of 2022.  She was ultimately discharged on steroids and made a complete recovery.  Last night she developed nasal stuffiness and had difficulty breathing again.  Normally she just uses albuterol intermittently and rarely.  She has nearly no wheezing unless she has a cold.  She seems to have flares most springs before the final frost happens.  She has had qvar in the past but had no inhalers leading up to her hospital stay as her symptoms were so mild.    PMH:  Past Medical History:   Diagnosis Date     Asthma      Asthma      Asthma      Diabetes (H)      Diabetes (H)      Diabetes (H)      Hypertension      Hypertension      Hypertension      Panic attacks      Panic attacks      Panic attacks        PSH:  Past Surgical History:   Procedure Laterality Date      ABSCESS DRAINAGE      On back     HYSTERECTOMY      age 33 - partial     NEPHRECTOMY PARTIAL Right      OOPHORECTOMY      age 48       SH:  Social History     Socioeconomic History     Marital status:      Spouse name: Not on file     Number of children: Not on file     Years of education: Not on file     Highest education level: Not on file   Occupational History     Not on file   Tobacco Use     Smoking status: Former     Types: Cigarettes     Quit date: 1992     Years since quittin.2     Smokeless tobacco: Never   Substance and Sexual Activity     Alcohol use: Yes     Drug use: Never     Sexual activity: Not Currently     Birth control/protection: Surgical   Other Topics Concern     Not on file   Social History Narrative     Not on file     Social Determinants of Health     Financial Resource Strain: Not on file   Food Insecurity: Not on file   Transportation Needs: Not on file   Physical Activity: Not on file   Stress: Not on file   Social Connections: Not on file   Intimate Partner Violence: Not on file   Housing Stability: Not on file       Family history:  Family History   Problem Relation Age of Onset     Breast Cancer Maternal Aunt 45.00     Alzheimer Disease Mother      Hypertension Mother      Chronic Obstructive Pulmonary Disease Father      The family history was reviewed and is not pertinent to the chief complaint or HPI.    ROS:  Review of Systems - History obtained from the patient  General ROS: negative  Psychological ROS: negative  ENT ROS: negative  Allergy and Immunology ROS: negative  Endocrine ROS: negative  Respiratory ROS: positive for - shortness of breath and wheezing  negative for - cough, hemoptysis or orthopnea  Cardiovascular ROS: no chest pain or palpitations  Gastrointestinal ROS: no abdominal pain, change in bowel habits, or black or bloody stools  Genito-Urinary ROS: no dysuria, trouble voiding, or hematuria  Musculoskeletal ROS: negative  Neurological ROS: no TIA  or stroke symptoms  Dermatological ROS: negative      Current Meds:  Current Outpatient Medications   Medication Sig Dispense Refill     albuterol (PROAIR HFA/PROVENTIL HFA/VENTOLIN HFA) 108 (90 Base) MCG/ACT inhaler Inhale 2 puffs into the lungs every 4 hours as needed for shortness of breath, wheezing or cough       amLODIPine (NORVASC) 5 MG tablet Take 5 mg by mouth daily       aspirin (ASA) 81 MG EC tablet Take 1 tablet (81 mg) by mouth daily       BD PEN NEEDLE ALEJANDRO 2ND GEN 32G X 4 MM miscellaneous USE THREE TIMES DAILY WITH NOVOLOG.       Cholecalciferol (VITAMIN D3) 50 MCG (2000 UT) CAPS Take 2 capsules by mouth daily       Continuous Blood Gluc Sensor (FREESTYLE TEMI 2 SENSOR) MISC Change every 14 days.       furosemide (LASIX) 40 MG tablet Take 1 tablet by mouth daily       hydrochlorothiazide (HYDRODIURIL) 12.5 MG tablet Take 25 mg by mouth daily       Lancets (ONETOUCH DELICA PLUS [id number]) MISC TEST THREE TIMES DAILY       metoprolol tartrate (LOPRESSOR) 50 MG tablet [METOPROLOL TARTRATE (LOPRESSOR) 50 MG TABLET] Take 2 tablets (100 mg total) by mouth 2 (two) times a day. 60 tablet 0     NOVOLOG FLEXPEN 100 UNIT/ML soln Inject 8 Units Subcutaneous 3 times daily (with meals) , plus sliding scale       omeprazole (PRILOSEC) 20 MG capsule [OMEPRAZOLE (PRILOSEC) 20 MG CAPSULE] Take 1 capsule (20 mg total) by mouth daily before breakfast. 30 capsule 0     ONETOUCH VERIO IQ test strip test three times daily       rosuvastatin (CRESTOR) 5 MG tablet [ROSUVASTATIN (CRESTOR) 5 MG TABLET] Take 1 tablet (5 mg total) by mouth daily. 30 tablet 0     TRESIBA FLEXTOUCH 100 UNIT/ML pen Inject 52 Units Subcutaneous daily       levothyroxine (SYNTHROID/LEVOTHROID) 100 MCG tablet Take 1 tablet (100 mcg) by mouth daily 30 tablet 0       Labs:  @clab@    I have personally reviewed all imaging and PFT data available pertinent to this visit.    Imaging studies:  Adult Cardiac Event Monitor    Result Date: 3/6/2023  Table  "formatting from the original result was not included. Images from the original result were not included.  [facility] Card[facility] Electrophysiology [address]  [place] [address] Office: [phone]  Fax: [phone] Cardiac Electrophysiology Cardiac event monitor report Cardiac event monitoring from 2/3/2023 to 3/4/2023 (monitored duration 16d 22h 52m). Baseline rhythm was sinus rhythm 63bpm with PVCs.  Reported heart rate range 50 to 120bpm, average 73bpm. No symptoms recorded. Automated recordings included sinus rhythm with borderline IVCD, intermittent PACs and PVCs. No sustained tachyarrhythmias. No atrial fibrillation. There were no pauses noted. Supraventricular and ventricular ectopic beat frequency are not reported on this monitoring modality.  Electronically signed by Gale Borges MD 3/6/2023  12:47 PM       PFTs: 3/13/23 - reviewed, these results are normal      Physical Exam:  /66 (BP Location: Right arm)   Pulse 67   Ht 1.6 m (5' 3\")   Wt 86.2 kg (190 lb)   SpO2 98%   BMI 33.66 kg/m    General - Well nourished  Ears/Mouth - Deferred given mask use during pandemic  Neck - no cervical lymphadenopathy  Lungs - Clear to ausculation bilaterally   CVS - regular rhythm with no murmurs, rubs or gallups  Abdomen - soft, NT, ND, NABS  Ext - no cyanosis, clubbing or edema  Skin - no rash  Psychology - alert and oriented, answers appropriate        Electronically signed by:    Dhruv Robles MD PhD  [facility]  "

## 2023-03-17 ENCOUNTER — VIRTUAL VISIT (OUTPATIENT)
Dept: PHARMACY | Facility: PHYSICIAN GROUP | Age: 68
End: 2023-03-17
Payer: COMMERCIAL

## 2023-03-17 DIAGNOSIS — E11.9 INSULIN DEPENDENT TYPE 2 DIABETES MELLITUS (H): Primary | ICD-10-CM

## 2023-03-17 DIAGNOSIS — Z79.4 INSULIN DEPENDENT TYPE 2 DIABETES MELLITUS (H): Primary | ICD-10-CM

## 2023-03-17 PROCEDURE — 99606 MTMS BY PHARM EST 15 MIN: CPT | Performed by: PHARMACIST

## 2023-03-17 PROCEDURE — 99607 MTMS BY PHARM ADDL 15 MIN: CPT | Performed by: PHARMACIST

## 2023-03-17 NOTE — PROGRESS NOTES
Medication Therapy Management (MTM) Encounter    ASSESSMENT:                            Medication Adherence/Access: No issues identified    Type 2 Diabetes: A1c is not at goal of <7.5%.   Not meeting goals of average glucose <150 mg/dL or time in target >70% with continuous glucose monitoring.   She would benefit from increasing basal insulin dose and pre-dinner Novolog dose.   She may also benefit from retrial of GLP-1 agonist since she was only on twice a day Byetta in the past. Will plan to discuss this further if her readings do not improve.     PLAN:                            1. Increase Tresiba to 54 units daily   2. Novolog base to 10 units before evening meal along with sliding scale. Continue 8 units base before breakfast and lunch.     Next visit: plan to continue to adjust insulin and may consider trying once a week injectable, like Trulicity    Follow-up: Return in 4 weeks (on 4/14/2023) for diabetes medication management, with me, using a phone visit at 9:00 AM.    SUBJECTIVE/OBJECTIVE:                          Mariama Keene is a 67 year old female called for a follow-up visit.  Today's visit is a follow-up MTM visit from 3/3/2023.     Reason for visit: Diabetes medication follow-up.    Allergies/ADRs: Reviewed in chart  Past Medical History: Reviewed in chart  Tobacco: She reports that she quit smoking about 31 years ago. Her smoking use included cigarettes. She has never used smokeless tobacco.  Alcohol: occasional- monthly or less    Medication Adherence/Access: no issues reported    Type 2 Diabetes:   Tresiba 52 units once daily  Novolog 8 units plus sliding scale three times days   She started using Freestyle Ursula continuous glucose monitor after our last visit. She is having high readings and is feeling bad about this.   She knows her diet has not been the best. She knows she can get her numbers down if she restricts her diet but this is not feasible for her long term.   Jardiance was helping  but she had to stop it due to kidneys about a month ago. Farxiga was not approved by her insurance. She had been on Byetta a long time ago which caused issues of nausea and it was severe. She is worried about this but may consider trying a different GLP1 option if needed.     Blood Sugar Ranges (patient reported):   Continuous Glucose Monitoring Results:   Average Glucose Last 7 Days = 220 mg/dl  12a-6a-230, 6a-12p- 202, 12p-6p-209, 6p-12a-230    Time in Target Last 7 Days:  Above 180 mg/dL: 75%  In target  mg/dL: 25%  Below 70 mg/dL: 0%      Symptoms of low blood sugar? Funny feeling, shaking, sweating- 40 sometime in Dec had some 50-52s but this hasn't been happening lately.   Symptoms of high blood sugar? none  Eye exam: up to date  Foot exam: up to date  Aspirin: Taking 81mg daily and denies side effects   Statin: rosuvastatin 5 mg and denies side effects  ACEi/ARB: No- follows with nephrology.          Latest Reference Range & Units Most Recent   Microalbumin Urine mg/g Cr <=19.9 mg/g 3,472.9 (H)  6/18/20 09:03   (H): Data is abnormally high    Wt Readings from Last 5 Encounters:   03/15/23 190 lb (86.2 kg)   02/01/23 193 lb (87.5 kg)   11/28/22 190 lb (86.2 kg)   06/29/22 195 lb (88.5 kg)   06/17/22 184 lb (83.5 kg)       Today's Vitals: There were no vitals taken for this visit.  ----------------      I spent 28 minutes with this patient today. All changes were made via collaborative practice agreement with ALEAH Justice. A copy of the visit note was provided to the patient's provider(s).    A summary of these recommendations was sent via [employer].    Alia Waite, PharmD, BCACP  Medication Therapy Management Pharmacist  [facility]  Pager: [phone]      Telemedicine Visit Details  Type of service:  Telephone visit  Start Time: 8:58 AM  End Time: 9:27 AM     Medication Therapy Recommendations  Insulin dependent type 2 diabetes mellitus (H)    Current Medication: NOVOLOG FLEXPEN 100  UNIT/ML soln   Rationale: Dose too low - Dosage too low - Effectiveness   Recommendation: Increase Dose   Status: Accepted per CPA          Current Medication: TRESIBA FLEXTOUCH 100 UNIT/ML pen   Rationale: Dose too low - Dosage too low - Effectiveness   Recommendation: Increase Dose   Status: Accepted per CPA

## 2023-03-21 NOTE — PATIENT INSTRUCTIONS
"Recommendations from today's MTM visit:                                                         1. Increase Tresiba to 54 units daily   2. Novolog base to 10 units before evening meal along with sliding scale. Continue 8 units base before breakfast and lunch.     Next visit: plan to continue to adjust insulin and may consider trying once a week injectable, like Trulicity    Follow-up: Return in 4 weeks (on 4/14/2023) for diabetes medication management, with me, using a phone visit at 9:00 AM.    It was great speaking with you today.  I value your experience and would be very thankful for your time in providing feedback in our clinic survey. In the next few days, you may receive an email or text message from [employer] with a link to a survey related to your  clinical pharmacist.\"     To schedule another MTM appointment, please call the MTM scheduling line at [phone].    My Clinical Pharmacist's contact information:                                                      Please feel free to contact me with any questions or concerns you have.      Alia Waite, PharmD, BCACP  Medication Therapy Management Pharmacist  [facility]  Pager: [phone]         "

## 2023-03-23 NOTE — PROGRESS NOTES
Medication Therapy Management (MTM) Encounter    ASSESSMENT:                            Medication Adherence/Access: No issues identified    Type 2 diabetes: A1c is not at goal of <7.5%.   Not meeting goals of average glucose <150 mg/dL or time in target >70%.   She would benefit from increasing base insulin dose for all meals to 10 units to get more of a 50:50 dose of basal/bolus. She will discuss retrial of GLP-1 agonist at her visit with provider next week, Trulicity may be the best option to try.     PLAN:                            1. Increase Novolog base to 10 units before breakfast, lunch and dinner, along with sliding scale.  2. Continue Tresiba 54 units daily    Future: consider trying Trulicity    Follow-up: Next Thursday with provider, 4/14/2023 with me    SUBJECTIVE/OBJECTIVE:                          Mariama Keene is a 67 year old female coming in for a follow-up visit.  Today's visit is a follow-up MTM visit from 3/17/2023.     Reason for visit: Diabetes medication options.    Allergies/ADRs: Reviewed in chart  Past Medical History: Reviewed in chart  Tobacco: She reports that she quit smoking about 31 years ago. Her smoking use included cigarettes. She has never used smokeless tobacco.  Alcohol: occasional- monthly or less    Medication Adherence/Access: no issues reported    Type 2 Diabetes:   Tresiba 54 units daily  Novolog 8 units before breakfast and lunch and 10 units before dinner, plus sliding scale  151-175- 1 unit  176-200- 2 units  201-225- 3 units  226-250- 4 units  251-275- 5 units   276-300- 6 units  301-325- 7 units  326-350- 8 units  351-375- 9 units    She continues to be worried about her high blood sugar numbers. She brings in her Ursula meter to upload her readings today. She had an appointment with her provider next week.   Blood Sugar Ranges (per continuous glucose monitor):         Symptoms of low blood sugar? Funny feeling, shaking, sweating- not currently.   Symptoms of high  blood sugar? none    Eye exam: up to date  Foot exam: up to date  Aspirin: Taking 81mg daily and denies side effects   Statin: rosuvastatin 5 mg  ACEi/ARB: No-follows with nephrology            Today's Vitals: There were no vitals taken for this visit.  ----------------      I spent 30 minutes with this patient today. All changes were made via collaborative practice agreement with ALEAH Justice. A copy of the visit note was provided to the patient's provider(s).    A summary of these recommendations was sent via [employer].    Alia Waite, PharmD, BCACP  Medication Therapy Management Pharmacist  [facility]  Pager: [phone]       Medication Therapy Recommendations  Insulin dependent type 2 diabetes mellitus (H)    Current Medication: NOVOLOG FLEXPEN 100 UNIT/ML soln   Rationale: Dose too low - Dosage too low - Effectiveness   Recommendation: Increase Dose   Status: Accepted per CPA

## 2023-03-24 ENCOUNTER — OFFICE VISIT (OUTPATIENT)
Dept: PHARMACY | Facility: PHYSICIAN GROUP | Age: 68
End: 2023-03-24
Payer: COMMERCIAL

## 2023-03-24 DIAGNOSIS — Z79.4 INSULIN DEPENDENT TYPE 2 DIABETES MELLITUS (H): Primary | ICD-10-CM

## 2023-03-24 DIAGNOSIS — E11.9 INSULIN DEPENDENT TYPE 2 DIABETES MELLITUS (H): Primary | ICD-10-CM

## 2023-03-24 PROCEDURE — 99607 MTMS BY PHARM ADDL 15 MIN: CPT | Performed by: PHARMACIST

## 2023-03-24 PROCEDURE — 99606 MTMS BY PHARM EST 15 MIN: CPT | Performed by: PHARMACIST

## 2023-03-27 ENCOUNTER — TRANSFERRED RECORDS (OUTPATIENT)
Dept: HEALTH INFORMATION MANAGEMENT | Facility: CLINIC | Age: 68
End: 2023-03-27
Payer: COMMERCIAL

## 2023-03-27 LAB — RETINOPATHY: POSITIVE

## 2023-03-29 NOTE — PATIENT INSTRUCTIONS
"Recommendations from today's MTM visit:                                                         1. Increase Novolog base to 10 units before breakfast, lunch and dinner, along with sliding scale.  2. Continue Tresiba 54 units daily    Future: consider trying Trulicity    Follow-up: Next Thursday with provider, 4/14/2023 with me    It was great speaking with you today.  I value your experience and would be very thankful for your time in providing feedback in our clinic survey. In the next few days, you may receive an email or text message from [employer] with a link to a survey related to your  clinical pharmacist.\"     To schedule another MTM appointment, please call the MTM scheduling line at [phone].    My Clinical Pharmacist's contact information:                                                      Please feel free to contact me with any questions or concerns you have.      Alia Waite, PharmD, BCACP  Medication Therapy Management Pharmacist  [facility]  Pager: [phone]         "
negative...

## 2023-04-14 ENCOUNTER — VIRTUAL VISIT (OUTPATIENT)
Dept: PHARMACY | Facility: PHYSICIAN GROUP | Age: 68
End: 2023-04-14
Payer: COMMERCIAL

## 2023-04-14 DIAGNOSIS — Z79.4 INSULIN DEPENDENT TYPE 2 DIABETES MELLITUS (H): Primary | ICD-10-CM

## 2023-04-14 DIAGNOSIS — E11.9 INSULIN DEPENDENT TYPE 2 DIABETES MELLITUS (H): Primary | ICD-10-CM

## 2023-04-14 PROCEDURE — 99606 MTMS BY PHARM EST 15 MIN: CPT | Mod: 93 | Performed by: PHARMACIST

## 2023-04-14 NOTE — PROGRESS NOTES
Medication Therapy Management (MTM) Encounter    ASSESSMENT:                            Medication Adherence/Access: No issues identified    Type 2 diabetes: A1c is not at goal of <7.5%.   Not meeting goals of average glucose <150 mg/dL or time in target >70%.   Medication options are limited due to her current renal function.   She declined adjusting medications today. Plans to work on increased exercise and diet changes.  She will discuss retrial of GLP-1 agonist at her next visit with provider, Trulicity may be the best option to try.     PLAN:                            1. Continue current insulin doses  2. Work towards increasing exercise and diet changes  3. Continue to consider trying Trulicity. Talk with your provider about this at your next diabetes visit.     Follow-up: with Indiana Nixon PA-C on 5/10/2023    SUBJECTIVE/OBJECTIVE:                          Mariama Keene is a 67 year old female called for a follow-up visit.  Today's visit is a follow-up MTM visit from 3/24/2023.     Reason for visit: Diabetes medication follow-up.    Allergies/ADRs: Reviewed in chart  Past Medical History: Reviewed in chart  Tobacco: She reports that she quit smoking about 31 years ago. Her smoking use included cigarettes. She has never used smokeless tobacco.  Alcohol: occasional- monthly or less    Medication Adherence/Access: no issues reported    Type 2 Diabetes:   Tresiba 54 units daily  Novolog 10 units before meals, plus sliding scale  151-175- 1 unit  176-200- 2 units  201-225- 3 units  226-250- 4 units  251-275- 5 units   276-300- 6 units  301-325- 7 units  326-350- 8 units  351-375- 9 units    Usually giving 12-14 units before dinner.   Going to start exercising using bike.   274 this morning  Prefers to do exercise and reduce carbs rather than make a medication change.     Blood Sugar Ranges (per continuous glucose monitor):   Continuous Glucose Monitoring Results:   Average Glucose Last 7 Days = 194  mg/dl  12a-6a-165, 6a-12p-161, 12p-6p-213, 6p-12a-249    Symptoms of low blood sugar? Funny feeling, shaking, sweating- not currently.   Symptoms of high blood sugar? none    Eye exam: up to date  Foot exam: up to date  Aspirin: Taking 81mg daily and denies side effects   Statin: rosuvastatin 5 mg  ACEi/ARB: No-follows with nephrology        Creatinine   Date Value Ref Range Status   12/28/2022 2.13 (H) 0.51 - 0.95 mg/dL Final     GFR Estimate   Date Value Ref Range Status   12/28/2022 24 (L) >60 mL/min/1.73m2 Final     Comment:     Effective December 21, 2021 eGFRcr in adults is calculated using the 2021 CKD-EPI creatinine equation which includes age and gender (Jinny et al., NEJM, DOI: 10.1056/MZBHzl2786960)   04/07/2021 32 (L) >60 mL/min/1.73m2 Final       Today's Vitals: There were no vitals taken for this visit.  ----------------      I spent 19 minutes with this patient today. All changes were made via collaborative practice agreement with ALEAH Justice. A copy of the visit note was provided to the patient's provider(s).    A summary of these recommendations was sent via [employer].    Alia Waite, PharmD, BCACP  Medication Therapy Management Pharmacist  [facility]  Pager: [phone]      Telemedicine Visit Details  Type of service:  Telephone visit  Start Time: 9:02 AM  End Time: 9:22 AM     Medication Therapy Recommendations  No medication therapy recommendations to display

## 2023-08-05 ENCOUNTER — HEALTH MAINTENANCE LETTER (OUTPATIENT)
Age: 68
End: 2023-08-05

## 2023-08-11 ENCOUNTER — TRANSFERRED RECORDS (OUTPATIENT)
Dept: HEALTH INFORMATION MANAGEMENT | Facility: CLINIC | Age: 68
End: 2023-08-11
Payer: COMMERCIAL

## 2023-08-11 LAB — HBA1C MFR BLD: 8.6 % (ref 4.2–6.1)

## 2023-08-30 NOTE — PROGRESS NOTES
Medication Therapy Management (MTM) Encounter    ASSESSMENT:                            Medication Adherence/Access: See below for considerations    Type 2 Diabetes:  Needs improvement, patient is not meeting goal A1c <7%. Recommend starting Trulicity injection this week and keeping close follow up for insulin titration at next visit.     Hypertension/CKD: Patient scheduled with Nephrology in November for management.     Hyperlipidemia: Needs improvement, however due to patient's kidney function, higher dose of Rosuvastatin is not indicated.  Consider switch to Atorvastatin 40 mg by mouth daily.     Hypothyroidism: Stable.     GERD: Stable.     Asthma: Stable.     Vitamin D Deficiency: Due for vitamin D level check.     PLAN:                            Begin taking your Trulicity injection this week.   Continue taking all other medications as prescribed. I will talk to Indiana Nixon about checking your Vitamin D levels and cholesterol at your next office visit.      Follow-up: Return in 1 week (on 9/7/2023) for Follow up, with me.    SUBJECTIVE/OBJECTIVE:                          Bettye Keene is a 67 year old female coming in for a follow-up visit from 4/14/23 with Alia.       Reason for visit: MTM.    Allergies/ADRs: Reviewed in chart  Past Medical History: Reviewed in chart  Tobacco: She reports that she quit smoking about 31 years ago. Her smoking use included cigarettes. She has never used smokeless tobacco.  Alcohol: infrequent use, one every other month     Medication Adherence/Access: occasionally forgets meal time insulin doses. No issues with oral meds or long acting insulin        Diabetes   Type 2 Diabetes:    Patient in clinic today to discuss diabetes management. Was working with Alia earlier this year who recommended a trial of Trulicity. Patient reports she has not started this yet as she is nervous about the possible side effects. Discussed ways to manage side effects and the mechanism of action  of the injectable. Patient is wanting to do a one month trial after our conversation.   NovoLOG 100 UNIT/ML Solution, 15 units on sliding scale as directed 3 times daily (before meals) (151-175- 1 unit 176-200- 2 units 201-225- 3 units 226-250- 4 units 251-275- 5 units 276-300- 6 units 301-325- 7 units 326-350- 8 units 351-375- 9 units)  Tresiba FlexTouch 100 UNIT/ML inject 54 units Subcutaneous once a day  Trulicity 0.75 MG/0.5ML inject 0.75 mg under the skin once weekly   Freestyle Ursula 2 sensor   Aspirin 81 mg daily  Patient is not experiencing side effects.  Blood sugar monitoring: Continuous Glucose Monitor Average glucose Last 14 Days - 186mg/dl  Current diabetes symptoms: none  Diet/Exercise: Reports exercise is challenging at this time due to ongoing chronic pain.      Eye exam is up to date  Foot exam: up to date  Serum creatinine: 2.13 mg/dL (H) 12/28/22 1207  Estimated creatinine clearance: 26.8 mL/min (A)  Urine Albumin: No results found for: UMALCR   A1c:  8/11 8.6%  5/10 8.1%  2/8 8.5%    Hypertension /CKD:   Patient is scheduled with nephrology in November to discuss medication management. Notes she had been on Jardiance several months ago and saw her sugars come down but was stopped by her kidney specialist. Continues to have lower extremity edema at this time.   Metoprolol Tartrate 100 MG Tablet, one tablet twice daily    Aspirin 81 mg tablet by mouth daily   hydroCHLOROthiazide 25 MG Tablet, one daily   amLODIPine 5 mg tablet by mouth daily   Losartan Potassium 25 mg tablet by mouth daily   Furosemide 20 MG Tablet, three tablets by mouth daily   Patient reports no current medication side effects.  Patient does not self-monitor blood pressure.       BP Readings from Last 3 Encounters:   08/31/23 (!) 146/70   03/15/23 134/66   03/03/23 136/72     Pulse Readings from Last 3 Encounters:   03/15/23 67   02/01/23 62   12/04/22 62     Hyperlipidemia   Rosuvastatin 5mg daily  Patient reports no  significant myalgias or other side effects.  The 10-year ASCVD risk score (Nestor DORADO, et al., 2019) is: 26.9%    Values used to calculate the score:      Age: 67 years      Sex: Female      Is Non- : No      Diabetic: Yes      Tobacco smoker: No      Systolic Blood Pressure: 146 mmHg      Is BP treated: Yes      HDL Cholesterol: 63 mg/dL      Total Cholesterol: 238 mg/dL     Recent Labs   Lab Test 12/08/22  0937 03/16/22  1011   CHOL 238* 151   HDL 63 42*   * 58   TRIG 191* 255*       Hypothyroidism   Levothyroxine 100 mcg daily.   Patient is having the following symptoms: none.      TSH   Date Value Ref Range Status   12/06/2022 3.09 0.30 - 4.20 uIU/mL Final   03/16/2022 5.88 (H) 0.30 - 5.00 uIU/mL Final     GERD:   Omeprazole 20 mg once daily   The patient does have a history of GI bleed. Patient feels that current regimen is not effective.    Asthma:   Reports her breathing has been well controlled, uses inhaler infrequently.   ProAir  (90 Base) MCG/ACT 2 puff(s) inhaled every 4 hours as needed      Vitamin D Deficiency:   Denies side effects at this time.   Vitamin D3 50 MCG (2000 UT) Capsule, one daily  Last vitamin D level:   3/16/22 - 17 micrograms/L    Today's Vitals: BP (!) 146/70 (BP Location: Right arm)   Wt 202 lb (91.6 kg)   BMI 35.78 kg/m    ----------------      I spent 60 minutes with this patient today. I offer these suggestions for consideration by Indiana Nixon. A copy of the visit note was provided to the patient's provider(s).    A summary of these recommendations was given to the patient.    Evelyn Odell, PharmD  Medication Therapy Management Pharmacist       Medication Therapy Recommendations  Insulin dependent type 2 diabetes mellitus (H)    Current Medication: TRULICITY 0.75 MG/0.5ML pen   Rationale: Does not understand instructions - Adherence - Adherence   Recommendation: Provide Education   Status: Patient Agreed - Adherence/Education         Mixed  hyperlipidemia    Current Medication: rosuvastatin (CRESTOR) 5 MG tablet   Rationale: More effective medication available - Ineffective medication - Effectiveness   Recommendation: Change Medication   Status: Contact Provider - Awaiting Response         Vitamin D deficiency    Current Medication: Cholecalciferol (VITAMIN D3) 50 MCG (2000 UT) CAPS   Rationale: Medication requires monitoring - Needs additional monitoring   Recommendation: Order Lab   Status: Contact Provider - Awaiting Response

## 2023-08-31 ENCOUNTER — OFFICE VISIT (OUTPATIENT)
Dept: PHARMACY | Facility: PHYSICIAN GROUP | Age: 68
End: 2023-08-31
Payer: COMMERCIAL

## 2023-08-31 VITALS — WEIGHT: 202 LBS | DIASTOLIC BLOOD PRESSURE: 70 MMHG | BODY MASS INDEX: 35.78 KG/M2 | SYSTOLIC BLOOD PRESSURE: 146 MMHG

## 2023-08-31 DIAGNOSIS — J45.30 MILD PERSISTENT ASTHMA WITHOUT COMPLICATION: ICD-10-CM

## 2023-08-31 DIAGNOSIS — E78.2 MIXED HYPERLIPIDEMIA: ICD-10-CM

## 2023-08-31 DIAGNOSIS — E03.9 HYPOTHYROIDISM, UNSPECIFIED TYPE: ICD-10-CM

## 2023-08-31 DIAGNOSIS — E55.9 VITAMIN D DEFICIENCY: ICD-10-CM

## 2023-08-31 DIAGNOSIS — I10 ESSENTIAL HYPERTENSION: ICD-10-CM

## 2023-08-31 DIAGNOSIS — N18.4 CKD (CHRONIC KIDNEY DISEASE) STAGE 4, GFR 15-29 ML/MIN (H): ICD-10-CM

## 2023-08-31 DIAGNOSIS — E11.9 INSULIN DEPENDENT TYPE 2 DIABETES MELLITUS (H): Primary | ICD-10-CM

## 2023-08-31 DIAGNOSIS — K21.9 GASTROESOPHAGEAL REFLUX DISEASE WITHOUT ESOPHAGITIS: ICD-10-CM

## 2023-08-31 DIAGNOSIS — Z79.4 INSULIN DEPENDENT TYPE 2 DIABETES MELLITUS (H): Primary | ICD-10-CM

## 2023-08-31 PROCEDURE — 99607 MTMS BY PHARM ADDL 15 MIN: CPT | Performed by: PHARMACIST

## 2023-08-31 PROCEDURE — 99606 MTMS BY PHARM EST 15 MIN: CPT | Performed by: PHARMACIST

## 2023-09-01 RX ORDER — DULAGLUTIDE 0.75 MG/.5ML
0.75 INJECTION, SOLUTION SUBCUTANEOUS
COMMUNITY
Start: 2023-05-10 | End: 2023-11-02

## 2023-09-01 RX ORDER — LOSARTAN POTASSIUM 50 MG/1
50 TABLET ORAL DAILY
COMMUNITY
Start: 2023-08-22

## 2023-09-01 NOTE — PATIENT INSTRUCTIONS
"Recommendations from today's MTM visit:                                                    MTM (medication therapy management) is a service provided by a clinical pharmacist designed to help you get the most of out of your medicines.   Today we reviewed what your medicines are for, how to know if they are working, that your medicines are safe and how to make your medicine regimen as easy as possible.      Begin taking your Trulicity injection this week.   Continue taking all other medications as prescribed. I will talk to Indiana Nixon about checking your Vitamin D levels and cholesterol at your next office visit.      Follow-up: Return in 1 week (on 9/7/2023) for Follow up, with me.    It was great speaking with you today.  I value your experience and would be very thankful for your time in providing feedback in our clinic survey. In the next few days, you may receive an email or text message from [employer] with a link to a survey related to your  clinical pharmacist.\"     To schedule another MTM appointment, please call the clinic directly or you may call the MTM scheduling line at [phone] or toll-free at [phone].     My Clinical Pharmacist's contact information:                                                      Please feel free to contact me with any questions or concerns you have.      Evelyn Odell, PharmD  Medication Therapy Management Pharmacist     "

## 2023-09-05 ENCOUNTER — ANCILLARY PROCEDURE (OUTPATIENT)
Dept: MAMMOGRAPHY | Facility: CLINIC | Age: 68
End: 2023-09-05
Attending: PHYSICIAN ASSISTANT
Payer: COMMERCIAL

## 2023-09-05 DIAGNOSIS — Z12.31 VISIT FOR SCREENING MAMMOGRAM: ICD-10-CM

## 2023-09-05 PROCEDURE — 77067 SCR MAMMO BI INCL CAD: CPT

## 2023-09-05 NOTE — PROGRESS NOTES
Medication Therapy Management (MTM) Encounter    ASSESSMENT:                            Medication Adherence/Access: See below for considerations    Type 2 Diabetes: Needs improvement, patient is not meeting A1c goal <8.0%. Due to her concerns with the weekly injection, consider switch to daily GLP-1, Victoza. If patient declines may need to consider addition of another oral medication.      Seasonal Allergies: Needs improvement, recommend Flonase nasal spray for symptom management after conversation with PCP.     PLAN:                            Consider switch to daily Victoza from Trulicity. We will discuss this further next week.   For your stuffy nose, start using Flonase nasal spray, two sprays in each nostril once daily.     Follow-up: Return in about 5 days (around 9/12/2023) for Follow up.    SUBJECTIVE/OBJECTIVE:                          Bettye Keene is a 67 year old female called for a follow-up visit from 8/31/23.       Reason for visit: Diabetes check in.    Allergies/ADRs: Reviewed in chart  Past Medical History: Reviewed in chart  Tobacco: She reports that she quit smoking about 31 years ago. Her smoking use included cigarettes. She has never used smokeless tobacco.  Alcohol: none    Medication Adherence/Access: no issues reported    Diabetes Type 2 Diabetes:    Patient reports she has not started Trulicity yet as she is nervous about the possible side effects. During our last visit we discussed ways to manage side effects and the mechanism of action of the injectable. Patient was wanting to do a one month trial after our conversation, however she is no longer wanting to start Trulicity. Discussed possibly trying Victoza as it is a once daily injection instead of weekly. Patient would like to consider this option before making a final decision.   NovoLOG 100 UNIT/ML Solution, 15 units on sliding scale as directed 3 times daily (before meals) (151-175- 1 unit 176-200- 2 units 201-225- 3 units 226-250- 4  units 251-275- 5 units 276-300- 6 units 301-325- 7 units 326-350- 8 units 351-375- 9 units)  Tresiba FlexTouch 100 UNIT/ML inject 54 units Subcutaneous once a day  Trulicity 0.75 MG/0.5ML inject 0.75 mg under the skin once weekly   Freestyle Ursula 2 sensor   Aspirin 81 mg daily  Patient is not experiencing side effects.  Blood sugar monitoring: Continuous Glucose Monitor Average glucose Last 14 Days - 186mg/dL  Current diabetes symptoms: none  Diet/Exercise: Reports exercise is challenging at this time due to ongoing chronic pain.   A1c:  8/11 8.6%  5/10 8.1%  2/8 8.5%     Eye exam is up to date  Foot exam: up to date  Urine Albumin: No results found for: UMALCR     Seasonal Allergies:  Patient reports ongoing stuffy nose and congestion. Notes she has not tried anything at this time. Reports this has been happening for the past several weeks.     Today's Vitals: There were no vitals taken for this visit.  ----------------      I spent 14 minutes with this patient today. All changes were made via verbal approval with ALEAH Justice. A copy of the visit note was provided to the patient's provider(s).    A summary of these recommendations was declined by the patient.    Evelyn Odell, PharmD  Medication Therapy Management Pharmacist    Telemedicine Visit Details  Type of service:  Telephone visit  Start Time:  11:01 AM  End Time: 11:15 AM       Medication Therapy Recommendations  Insulin dependent type 2 diabetes mellitus (H)    Current Medication: TRULICITY 0.75 MG/0.5ML pen   Rationale: Patient prefers not to take - Adherence - Adherence   Recommendation: Change Medication   Status: Contact Provider - Awaiting Response         Seasonal allergic rhinitis, unspecified trigger    Rationale: Untreated condition - Needs additional medication therapy - Indication   Recommendation: Start Medication   Status: Accepted per Provider

## 2023-09-07 ENCOUNTER — VIRTUAL VISIT (OUTPATIENT)
Dept: PHARMACY | Facility: PHYSICIAN GROUP | Age: 68
End: 2023-09-07
Payer: COMMERCIAL

## 2023-09-07 DIAGNOSIS — J30.2 SEASONAL ALLERGIC RHINITIS, UNSPECIFIED TRIGGER: ICD-10-CM

## 2023-09-07 DIAGNOSIS — Z79.4 INSULIN DEPENDENT TYPE 2 DIABETES MELLITUS (H): Primary | ICD-10-CM

## 2023-09-07 DIAGNOSIS — E11.9 INSULIN DEPENDENT TYPE 2 DIABETES MELLITUS (H): Primary | ICD-10-CM

## 2023-09-07 PROCEDURE — 99606 MTMS BY PHARM EST 15 MIN: CPT | Performed by: PHARMACIST

## 2023-09-07 PROCEDURE — 99607 MTMS BY PHARM ADDL 15 MIN: CPT | Performed by: PHARMACIST

## 2023-09-07 NOTE — PATIENT INSTRUCTIONS
"Recommendations from today's MTM visit:                                                    MTM (medication therapy management) is a service provided by a clinical pharmacist designed to help you get the most of out of your medicines.      Consider switch to daily Victoza from Trulicity. We will discuss this further next week.   For your stuffy nose, start using Flonase nasal spray, two sprays in each nostril once daily.     Follow-up: Return in about 5 days (around 9/12/2023) for Follow up.    It was great speaking with you today.  I value your experience and would be very thankful for your time in providing feedback in our clinic survey. In the next few days, you may receive an email or text message from [employer] with a link to a survey related to your  clinical pharmacist.\"     To schedule another MTM appointment, please call the clinic directly or you may call the MTM scheduling line at [phone] or toll-free at [phone].     My Clinical Pharmacist's contact information:                                                      Please feel free to contact me with any questions or concerns you have.      Evelyn Odell, PharmD  Medication Therapy Management Pharmacist     "

## 2023-10-09 NOTE — PROGRESS NOTES
Medication Therapy Management (MTM) Encounter    ASSESSMENT:                            Medication Adherence/Access: See below for considerations    Type 2 Diabetes: Needs improvement, patient is not meeting A1c goal <8.0%. Patient is wanting to start the Trulicity once weekly injection and will begin this today. Close follow up recommended.      PLAN:                            Take your first injection of Trulicity today.  We will follow up next week to discuss how the first week went.      Follow-up: Return in 1 week (on 10/17/2023) for Follow up, with me.    SUBJECTIVE/OBJECTIVE:                          Bettye Keene is a 68 year old female coming in for a follow-up visit from 9/07/23.       Reason for visit: Diabetes check in.    Allergies/ADRs: Reviewed in chart  Past Medical History: Reviewed in chart  Tobacco: She reports that she quit smoking about 31 years ago. Her smoking use included cigarettes. She has never used smokeless tobacco.  Alcohol: none    Medication Adherence/Access: no issues reported      Diabetes Type 2 Diabetes:    Since last visit, patient has contemplated the Trulicity injection. States she is ready to start this medication during our visit today. She has gone back and forth due to her previous intolerance to Byetta several years ago. I offered switching to a once daily such as Victoza, however the patient is wanting to stick with Trulicity at this time. We went through the medication, how it works and ways to manage side effects. She reports that she is wanting to start walking more often and does not snack much but will have large meals and often a sweet after. Talked through diet and exercise during our visit today.      NovoLOG 100 UNIT/ML Solution, 15 units on sliding scale as directed 3 times daily (before meals) (151-175- 1 unit 176-200- 2 units 201-225- 3 units 226-250- 4 units 251-275- 5 units 276-300- 6 units 301-325- 7 units 326-350- 8 units 351-375- 9 units)  Tresiba FlexTouch  100 UNIT/ML inject 54 units Subcutaneous once a day  Trulicity 0.75 MG/0.5ML inject 0.75 mg under the skin once weekly   Freestyle Ursula 2 sensor   Aspirin 81 mg daily  Patient is not experiencing side effects.  Blood sugar monitoring: Continuous Glucose Monitor Average glucose Last 14 day average 200 mg/dL  Last 14 days:   In target 40%  Above 60%    Current diabetes symptoms: none  Diet/Exercise: Reports exercise is challenging at this time due to ongoing chronic pain.   A1c:  8/11 8.6%  5/10 8.1%  2/8 8.5%     Eye exam is up to date  Foot exam: up to date  Urine Albumin: No results found for: UMALCR       Today's Vitals: /78 (BP Location: Left arm)   ----------------    I spent 40 minutes with this patient today. No changes were made during today's visit. A copy of the visit note was provided to the patient's provider(s).    A summary of these recommendations was declined by the patient.    Evelyn Odell, PharmD  Medication Therapy Management Pharmacist

## 2023-10-10 ENCOUNTER — OFFICE VISIT (OUTPATIENT)
Dept: PHARMACY | Facility: PHYSICIAN GROUP | Age: 68
End: 2023-10-10
Payer: COMMERCIAL

## 2023-10-10 VITALS — DIASTOLIC BLOOD PRESSURE: 78 MMHG | SYSTOLIC BLOOD PRESSURE: 115 MMHG

## 2023-10-10 DIAGNOSIS — Z79.4 INSULIN DEPENDENT TYPE 2 DIABETES MELLITUS (H): Primary | ICD-10-CM

## 2023-10-10 DIAGNOSIS — E11.9 INSULIN DEPENDENT TYPE 2 DIABETES MELLITUS (H): Primary | ICD-10-CM

## 2023-10-10 PROCEDURE — 99606 MTMS BY PHARM EST 15 MIN: CPT | Performed by: PHARMACIST

## 2023-10-10 PROCEDURE — 99607 MTMS BY PHARM ADDL 15 MIN: CPT | Performed by: PHARMACIST

## 2023-10-10 NOTE — PATIENT INSTRUCTIONS
"Recommendations from today's MTM visit:                                                       Take your first injection of Trulicity today.  We will follow up next week to discuss how the first week went.      Follow-up: Return in 1 week (on 10/17/2023) for Follow up, with me.    It was great speaking with you today.  I value your experience and would be very thankful for your time in providing feedback in our clinic survey. In the next few days, you may receive an email or text message from [employer] with a link to a survey related to your  clinical pharmacist.\"     To schedule another MTM appointment, please call the clinic directly or you may call the MTM scheduling line at [phone] or toll-free at [phone].     My Clinical Pharmacist's contact information:                                                      Please feel free to contact me with any questions or concerns you have.      Evelyn Odell, PharmD  Medication Therapy Management Pharmacist   "

## 2023-10-14 ENCOUNTER — HEALTH MAINTENANCE LETTER (OUTPATIENT)
Age: 68
End: 2023-10-14

## 2023-10-16 NOTE — PROGRESS NOTES
Medication Therapy Management (MTM) Encounter    ASSESSMENT:                            Medication Adherence/Access: See below for considerations    Type 2 Diabetes: Needs improvement, patient is not meeting A1c goal <8.0%. Patient has been unable to start the Trulicity injection. Will need insulin titration, however patient would like to focus on her diet and exercise until her colonoscopy on the 26th. Wanting to adjust insulin after this procedure is completed. Close follow up recommended. Due for urine albumin lab draw.     PLAN:                            Focus on diet and exercise as well as taking your medications as prescribed at this time.     For Indiana Nixon:  - Patient is due for microalbumin lab    Follow-up: Return in 16 days (on 11/2/2023) for Follow up, with me, using a phone visit.    SUBJECTIVE/OBJECTIVE:                          Bettye Keene is a 68 year old female called for a follow-up visit from 10/10/23.       Reason for visit: Diabetes check in.    Allergies/ADRs: Reviewed in chart  Past Medical History: Reviewed in chart  Tobacco: She reports that she quit smoking about 31 years ago. Her smoking use included cigarettes. She has never used smokeless tobacco.  Alcohol: none    Medication Adherence/Access: no issues reported        Diabetes Type 2 Diabetes:    Since last visit, patient has not started the Trulicity. Reports she had the trulicity injection ready to go and was about to give the injection but was not mentally ready to give the shot. Patient also noted she is wanting to hold off on dose adjustments of her insulin until after her colonoscopy next week. Notes she is nervous to complete this, has been having some ongoing constipation recently.      NovoLOG 100 UNIT/ML Solution, 15 units on sliding scale as directed 3 times daily (before meals) (151-175- 1 unit 176-200- 2 units 201-225- 3 units 226-250- 4 units 251-275- 5 units 276-300- 6 units 301-325- 7 units 326-350- 8 units 351-375- 9  "units)  Tresiba FlexTouch 100 UNIT/ML inject 54 units Subcutaneous once a day  Freestyle Ursula 2 sensor   Aspirin 81 mg daily  Patient is not experiencing side effects.  Blood sugar monitoring: Continuous Glucose Monitor Average glucose Last 7 day average 175 mg/dL    Current diabetes symptoms: none  Diet/Exercise: Reports exercise is challenging at this time due to ongoing chronic pain.   A1c:  8/11 8.6%  5/10 8.1%  2/8 8.5%     Eye exam is up to date  Foot exam: up to date  Urine Albumin: No results found for: \"UMALCR\"       Today's Vitals: There were no vitals taken for this visit.  ----------------    I spent 20 minutes with this patient today. No changes were made during today's visit. A copy of the visit note was provided to the patient's provider(s).    A summary of these recommendations was declined by the patient.    Evelyn Odell, PharmD  Medication Therapy Management Pharmacist    Telemedicine Visit Details  Type of service:  Telephone visit  Start Time:  10:08 AM  End Time:  10:28 AM       Medication Therapy Recommendations  No medication therapy recommendations to display     "

## 2023-10-17 ENCOUNTER — VIRTUAL VISIT (OUTPATIENT)
Dept: PHARMACY | Facility: PHYSICIAN GROUP | Age: 68
End: 2023-10-17
Payer: COMMERCIAL

## 2023-10-17 DIAGNOSIS — Z79.4 INSULIN DEPENDENT TYPE 2 DIABETES MELLITUS (H): Primary | ICD-10-CM

## 2023-10-17 DIAGNOSIS — E11.9 INSULIN DEPENDENT TYPE 2 DIABETES MELLITUS (H): Primary | ICD-10-CM

## 2023-10-17 PROCEDURE — 99606 MTMS BY PHARM EST 15 MIN: CPT | Mod: 93 | Performed by: PHARMACIST

## 2023-10-17 NOTE — PATIENT INSTRUCTIONS
"Recommendations from today's MTM visit:                                                       Focus on diet and exercise as well as taking your medications as prescribed at this time.     Follow-up: Return in 16 days (on 11/2/2023) for Follow up, with me, using a phone visit.    It was great speaking with you today.  I value your experience and would be very thankful for your time in providing feedback in our clinic survey. In the next few days, you may receive an email or text message from [facility] [facility] with a link to a survey related to your  clinical pharmacist.\"     To schedule another MTM appointment, please call the clinic directly or you may call the MTM scheduling line at [phone] or toll-free at [phone].     My Clinical Pharmacist's contact information:                                                      Please feel free to contact me with any questions or concerns you have.      Evelyn Odell, PharmD  Medication Therapy Management Pharmacist     "

## 2023-11-01 NOTE — PROGRESS NOTES
Medication Therapy Management (MTM) Encounter    ASSESSMENT:                            Medication Adherence/Access: See below for considerations    Type 2 Diabetes: Improving, patient is not meeting A1c goal <8.0%. However, 7 day average has improved since our last visit. Encouraged dietary and lifestyle adjustments in combination with her insulin regimen. Close follow up recommended. Due for urine albumin lab draw. Patient has diabetes visit scheduled with PCP on 11/13.     PLAN:                            Continue to focus on diet and exercise as well as taking your medications as prescribed at this time.     For Indiana Nixon:  - Patient is due for microalbumin lab at diabetes visit on 11/13.     Follow-up: Return in 6 weeks (on 12/14/2023) for Follow up, with me.    SUBJECTIVE/OBJECTIVE:                          Bettye Keene is a 68 year old female called for a follow-up visit from 10/17/23.       Reason for visit: Diabetes check in.    Allergies/ADRs: Reviewed in chart  Past Medical History: Reviewed in chart  Tobacco: She reports that she quit smoking about 31 years ago. Her smoking use included cigarettes. She has never used smokeless tobacco.  Alcohol: none    Medication Adherence/Access: no issues reported        Diabetes Type 2 Diabetes:    Since last visit, patient has noticed her blood sugars have been coming down. She had a colonoscopy completed last week and has made changes to her diet since then. Reports she is giving 48 units of her long acting insulin once daily and only needing about 8-10 units of meal time insulin for each meal. She is motivated to continue working on these lifestyle adjustments.       NovoLOG 100 UNIT/ML Solution, 15 units on sliding scale as directed 3 times daily (before meals) (151-175- 1 unit 176-200- 2 units 201-225- 3 units 226-250- 4 units 251-275- 5 units 276-300- 6 units 301-325- 7 units 326-350- 8 units 351-375- 9 units)  Tresiba FlexTouch 100 UNIT/ML inject 54 units  "Subcutaneous once a day  Freestyle Ursula 2 sensor   Aspirin 81 mg daily  Patient is not experiencing side effects.  Blood sugar monitoring: Continuous Glucose Monitor Average glucose Last 7 day average 120 mg/dL    Current diabetes symptoms: none  Diet/Exercise: Reports exercise is challenging at this time due to ongoing chronic pain.   A1c:  8/11 8.6%  5/10 8.1%  2/8 8.5%     Eye exam is up to date  Foot exam: up to date  Urine Albumin: No results found for: \"UMALCR\"       Today's Vitals: There were no vitals taken for this visit.  ----------------    I spent 10 minutes with this patient today. No changes were made during today's visit. A copy of the visit note was provided to the patient's provider(s).    A summary of these recommendations was declined by the patient.    Evelyn Odell, PharmD  Medication Therapy Management Pharmacist    Telemedicine Visit Details  Type of service:  Telephone visit  Start Time:  09:59 AM  End Time:  10:09 AM       Medication Therapy Recommendations  No medication therapy recommendations to display     "

## 2023-11-02 ENCOUNTER — VIRTUAL VISIT (OUTPATIENT)
Dept: PHARMACY | Facility: PHYSICIAN GROUP | Age: 68
End: 2023-11-02
Payer: COMMERCIAL

## 2023-11-02 DIAGNOSIS — Z79.4 INSULIN DEPENDENT TYPE 2 DIABETES MELLITUS (H): Primary | ICD-10-CM

## 2023-11-02 DIAGNOSIS — E11.9 INSULIN DEPENDENT TYPE 2 DIABETES MELLITUS (H): Primary | ICD-10-CM

## 2023-11-02 PROCEDURE — 99606 MTMS BY PHARM EST 15 MIN: CPT | Mod: 93 | Performed by: PHARMACIST

## 2023-11-13 ENCOUNTER — TRANSFERRED RECORDS (OUTPATIENT)
Dept: HEALTH INFORMATION MANAGEMENT | Facility: CLINIC | Age: 68
End: 2023-11-13
Payer: COMMERCIAL

## 2023-11-13 ENCOUNTER — LAB REQUISITION (OUTPATIENT)
Dept: LAB | Facility: CLINIC | Age: 68
End: 2023-11-13

## 2023-11-13 DIAGNOSIS — I10 ESSENTIAL (PRIMARY) HYPERTENSION: ICD-10-CM

## 2023-11-13 DIAGNOSIS — E78.5 HYPERLIPIDEMIA, UNSPECIFIED: ICD-10-CM

## 2023-11-13 DIAGNOSIS — E03.9 HYPOTHYROIDISM, UNSPECIFIED: ICD-10-CM

## 2023-11-13 LAB — HBA1C MFR BLD: 7.1 % (ref 4.2–6.1)

## 2023-11-13 PROCEDURE — 84443 ASSAY THYROID STIM HORMONE: CPT | Performed by: PHYSICIAN ASSISTANT

## 2023-11-13 PROCEDURE — 80061 LIPID PANEL: CPT | Performed by: PHYSICIAN ASSISTANT

## 2023-11-13 PROCEDURE — 80053 COMPREHEN METABOLIC PANEL: CPT | Performed by: PHYSICIAN ASSISTANT

## 2023-11-14 LAB
ALBUMIN SERPL BCG-MCNC: 4.3 G/DL (ref 3.5–5.2)
ALP SERPL-CCNC: 69 U/L (ref 35–104)
ALT SERPL W P-5'-P-CCNC: 14 U/L (ref 0–50)
ANION GAP SERPL CALCULATED.3IONS-SCNC: 15 MMOL/L (ref 7–15)
AST SERPL W P-5'-P-CCNC: 19 U/L (ref 0–45)
BILIRUB SERPL-MCNC: 0.7 MG/DL
BUN SERPL-MCNC: 43.8 MG/DL (ref 8–23)
CALCIUM SERPL-MCNC: 9.1 MG/DL (ref 8.8–10.2)
CHLORIDE SERPL-SCNC: 101 MMOL/L (ref 98–107)
CHOLEST SERPL-MCNC: 117 MG/DL
CREAT SERPL-MCNC: 2.53 MG/DL (ref 0.51–0.95)
DEPRECATED HCO3 PLAS-SCNC: 24 MMOL/L (ref 22–29)
EGFRCR SERPLBLD CKD-EPI 2021: 20 ML/MIN/1.73M2
GLUCOSE SERPL-MCNC: 118 MG/DL (ref 70–99)
HDLC SERPL-MCNC: 41 MG/DL
LDLC SERPL CALC-MCNC: 42 MG/DL
NONHDLC SERPL-MCNC: 76 MG/DL
POTASSIUM SERPL-SCNC: 4 MMOL/L (ref 3.4–5.3)
PROT SERPL-MCNC: 7.1 G/DL (ref 6.4–8.3)
SODIUM SERPL-SCNC: 140 MMOL/L (ref 135–145)
TRIGL SERPL-MCNC: 171 MG/DL
TSH SERPL DL<=0.005 MIU/L-ACNC: 1.14 UIU/ML (ref 0.3–4.2)

## 2023-11-15 ENCOUNTER — HOSPITAL ENCOUNTER (OUTPATIENT)
Dept: ULTRASOUND IMAGING | Facility: HOSPITAL | Age: 68
Discharge: HOME | End: 2023-11-15
Attending: INTERNAL MEDICINE | Admitting: INTERNAL MEDICINE
Payer: COMMERCIAL

## 2023-11-15 DIAGNOSIS — N18.9 ANEMIA IN CKD (CHRONIC KIDNEY DISEASE): ICD-10-CM

## 2023-11-15 DIAGNOSIS — E11.22 TYPE 2 DIABETES MELLITUS WITH DIABETIC CHRONIC KIDNEY DISEASE (H): ICD-10-CM

## 2023-11-15 DIAGNOSIS — N18.4 CHRONIC KIDNEY DISEASE, STAGE 4 (SEVERE) (H): ICD-10-CM

## 2023-11-15 DIAGNOSIS — N25.0 RENAL OSTEODYSTROPHY: ICD-10-CM

## 2023-11-15 DIAGNOSIS — I13.10: ICD-10-CM

## 2023-11-15 DIAGNOSIS — D63.1 ANEMIA IN CKD (CHRONIC KIDNEY DISEASE): ICD-10-CM

## 2023-11-15 DIAGNOSIS — N18.4: ICD-10-CM

## 2023-11-15 PROCEDURE — 76770 US EXAM ABDO BACK WALL COMP: CPT

## 2023-12-12 NOTE — PROGRESS NOTES
Medication Therapy Management (MTM) Encounter    ASSESSMENT:                            Medication Adherence/Access: See below for considerations    Diabetes: Stable. Patient is meeting A1c goal of < 7.5%. Recommend continuing current regimen.     PLAN:                            Continue to focus on diet and exercise as well as taking your medications as prescribed at this time.     Follow-up: Return in 8 weeks (on 2/6/2024) for Follow up, with me, using a phone visit.    SUBJECTIVE/OBJECTIVE:                          Bettye Keene is a 68 year old female called for a follow-up visit from 11/02/23.       Reason for visit: Diabetes check in.    Allergies/ADRs: Reviewed in chart  Past Medical History: Reviewed in chart  Tobacco: She reports that she quit smoking about 31 years ago. Her smoking use included cigarettes. She has never used smokeless tobacco.  Alcohol: none    Medication Adherence/Access: no issues reported.      Diabetes   Type 2 Diabetes:    Since last visit, patient has had great improvement in her A1c. She recently lost her ex- suddenly and has been overwhelmed, stressed and grieving his loss. She wants to continue current regimen and focus on her lifestyle at this time. She has seen the dietician through the kidney doctor. They instructed her to focus on diabetes management and to eat three meals daily and take short acting insulin with each meal.      NovoLOG 100 UNIT/ML Solution, 15 units on sliding scale as directed 3 times daily before meals (151-175- 1 unit 176-200- 2 units 201-225- 3 units 226-250- 4 units 251-275- 5 units 276-300- 6 units 301-325- 7 units 326-350- 8 units 351-375- 9 units)  Tresiba FlexTouch 100 UNIT/ML inject 54 units Subcutaneous once a day  Freestyle Ursula 2 sensor   Aspirin 81 mg daily  Patient is not experiencing side effects.  Blood sugar monitoring: Continuous Glucose Monitor Average glucose   Last 7 day average 126 mg/dL  Last 14 day average 145 mg/dL    Current  diabetes symptoms: none  Diet/Exercise: Reports exercise is challenging at this time due to ongoing chronic pain.   A1c:  11/13 7.1%  8/11 8.6%  5/10 8.1%  2/8 8.5%     Eye exam is up to date  Foot exam: up to date  Urine Albumin:        Today's Vitals: There were no vitals taken for this visit.  ----------------    I spent 20 minutes with this patient today. No changes were made during today's visit. A copy of the visit note was provided to the patient's provider(s).    A summary of these recommendations was declined by the patient.    Evelyn Odell, PharmD  Medication Therapy Management Pharmacist    Telemedicine Visit Details  Type of service:  Telephone visit  Start Time:  10:00 AM  End Time:  10:20 AM     Medication Therapy Recommendations  No medication therapy recommendations to display

## 2023-12-14 ENCOUNTER — VIRTUAL VISIT (OUTPATIENT)
Dept: PHARMACY | Facility: PHYSICIAN GROUP | Age: 68
End: 2023-12-14
Payer: COMMERCIAL

## 2023-12-14 DIAGNOSIS — E11.9 INSULIN DEPENDENT TYPE 2 DIABETES MELLITUS (H): Primary | ICD-10-CM

## 2023-12-14 DIAGNOSIS — Z79.4 INSULIN DEPENDENT TYPE 2 DIABETES MELLITUS (H): Primary | ICD-10-CM

## 2023-12-14 PROCEDURE — 99606 MTMS BY PHARM EST 15 MIN: CPT | Mod: 93 | Performed by: PHARMACIST

## 2023-12-14 NOTE — PATIENT INSTRUCTIONS
"Recommendations from today's MTM visit:                                                       Continue to focus on diet and exercise as well as taking your medications as prescribed at this time.     Follow-up: Return in 8 weeks (on 2/6/2024) for Follow up, with me, using a phone visit.    It was great speaking with you today.  I value your experience and would be very thankful for your time in providing feedback in our clinic survey. In the next few days, you may receive an email or text message from [facility] with a link to a survey related to your  clinical pharmacist.\"     To schedule another MTM appointment, please call the clinic directly or you may call the MTM scheduling line at [phone] or toll-free at [phone].     My Clinical Pharmacist's contact information:                                                      Please feel free to contact me with any questions or concerns you have.      Evelyn Odell, PharmD  Medication Therapy Management Pharmacist   "

## 2024-02-02 NOTE — PROGRESS NOTES
Medication Therapy Management (MTM) Encounter    ASSESSMENT:                            Medication Adherence/Access: See below for considerations    Type 2 Diabetes:  Needs improvement, patient is not meeting goal A1c <7%. Patient is due for A1c check and will be seeing PCP next week for this. Advised patient to bring in rachana reader to download report. Pending report and A1c, likely need to increase insulin dosing.     Hypertension/CKD: Managed by nephrology.     Hyperlipidemia: Stable.     Hypothyroidism: Stable.     GERD: Stable.     Asthma/Congestion: Needs improvement. Plan to discuss with PCP at visit next week. Recommend trial of alternative nasal spray for symptom management. Patient has failed fluticasone nasal spray in the past.     Vitamin D Deficiency: Due for vitamin D level check.     PLAN:                            Continue taking your medications as prescribed.   I will talk to [place] about adjustments to your medications.   Focus on dietary and lifestyle changes at this time as well.   We will recheck labs at next weeks PCP visit.     Follow-up: Return in about 4 weeks (around 3/5/2024) for Follow up. With me and return on 2/14/24 for a visit with [place] Nixon.     SUBJECTIVE/OBJECTIVE:                          Bettye Keene is a 68 year old female called for a follow-up visit from 12/14/23 with me.      Reason for visit: full med review.    Allergies/ADRs: Reviewed in chart  Past Medical History: Reviewed in chart  Tobacco: She reports that she quit smoking about 32 years ago. Her smoking use included cigarettes. She has never used smokeless tobacco.  Alcohol: infrequent use, one every other month     Medication Adherence/Access: occasionally forgets meal time insulin doses. No issues with oral meds or long acting insulin        Diabetes   Type 2 Diabetes:    Patient is called today to discuss her diabetes management. Notes since our last visit she has not been eating as well as she should. She is  "feeling ok with the recent loss of her ex- and has been managing a lot of the paperwork. She wants to make dietary and lifestyle adjustments again and will bring in her reader next week when she is seeing [place]. Discussed likelihood of needing to increase her insulin again, and patient agrees. She has been using 8-12 units of meal time insulin and 52 units of her long acting.     Novolog 100 UNIT/ML Solution, 15 units on sliding scale as directed 3 times daily (before meals) (151-175- 1 unit 176-200- 2 units 201-225- 3 units 226-250- 4 units 251-275- 5 units 276-300- 6 units 301-325- 7 units 326-350- 8 units 351-375- 9 units)  Tresiba FlexTouch 100 UNIT/ML inject 52 units Subcutaneous once a day  Freestyle Ursula 2 sensor     Patient is not experiencing side effects.  Blood sugar monitoring: Continuous Glucose Monitor Average glucose Last 14 Days - 182 mg/dl     Current diabetes symptoms: none  Diet/Exercise: Reports exercise is challenging at this time due to ongoing chronic pain.      Eye exam is up to date, 5/17/23  Foot exam: up to date    Urine Albumin: No results found for: \"UMALCR\"   A1c:  11/13 7.1%  8/11 8.6%  5/10 8.1%  2/8 8.5%    Hypertension /CKD:   Recently was seen by nephrology who stopped her hydrochlorothiazide and increased losartan. Bettye denies issues or side effects with this change and has been taking all other medications as prescribed. She will be following up with nephrology in April for labs.    Metoprolol Tartrate 100 MG Tablet, one tablet twice daily    Amlodipine 5 mg tablet by mouth daily   Losartan Potassium 50 mg tablet by mouth daily   Furosemide 40 MG Tablet, twice daily     Patient reports no current medication side effects.  Patient does not self-monitor blood pressure.       BP Readings from Last 3 Encounters:   10/10/23 115/78   08/31/23 (!) 146/70   03/15/23 134/66     Pulse Readings from Last 3 Encounters:   03/15/23 67   02/01/23 62   12/04/22 62     Hyperlipidemia "   Rosuvastatin 5mg daily  Patient reports no significant myalgias or other side effects.  The ASCVD Risk score (Nestor DORADO, et al., 2019) failed to calculate for the following reasons:    The valid total cholesterol range is 130 to 320 mg/dL     Recent Labs   Lab Test 11/13/23  1140 12/08/22  0937   CHOL 117 238*   HDL 41* 63   LDL 42 137*   TRIG 171* 191*     Hypothyroidism   Levothyroxine 100 mcg daily.   Patient is having the following symptoms: none.      TSH   Date Value Ref Range Status   11/13/2023 1.14 0.30 - 4.20 uIU/mL Final   03/16/2022 5.88 (H) 0.30 - 5.00 uIU/mL Final     GERD:   Omeprazole 20 mg once daily    The patient does have a history of GI bleed. Patient feels that current regimen is not effective.    Asthma/Congestion:   Bettye reports she has not been able to sleep due to worsening and bothersome congestion which makes breathing difficult. Reports it is the worst when she sleeps in her bedroom, often needing to move into the living room. Feels like it is really starting to impact her quality of life with the lack of sleep. Reports laying on her right side causes everything to plug up and she can't breathe. She does live in an older building and notes there is visible dripping water around the building. Encouraged Bettye to bring this up to the management at her building as there could be mold/mildew.  She has used a fluticasone inhaler in the past and notes this was not effective. She is not using any inhalers at this time, but keeps a rescue inhaler on hand.     ProAir  (90 Base) MCG/ACT 2 puff(s) inhaled every 4 hours as needed      Vitamin D Deficiency:   Denies side effects at this time.   Vitamin D3 50 MCG (2000 UT) Capsule, one daily  Last vitamin D level:   3/16/22 - 17 micrograms/L    Today's Vitals: There were no vitals taken for this visit. - televist  ----------------      I spent 34 minutes with this patient today. I offer these suggestions for consideration by Indiana Nixon. A copy of  the visit note was provided to the patient's provider(s).    A summary of these recommendations was sent to the patient via [employer].    Evelyn Odell, PharmD  Medication Therapy Management Pharmacist    Telemedicine Visit Details  Type of service:  Telephone visit  Start Time:  10:00 AM  End Time:  10:34 AM     Medication Therapy Recommendations  Mild persistent asthma without complication    Rationale: Untreated condition - Needs additional medication therapy - Indication   Recommendation: Make Appt with PCP   Status: Accepted - no CPA Needed

## 2024-02-06 ENCOUNTER — VIRTUAL VISIT (OUTPATIENT)
Dept: PHARMACY | Facility: PHYSICIAN GROUP | Age: 69
End: 2024-02-06
Payer: COMMERCIAL

## 2024-02-06 DIAGNOSIS — Z79.4 INSULIN DEPENDENT TYPE 2 DIABETES MELLITUS (H): Primary | ICD-10-CM

## 2024-02-06 DIAGNOSIS — J45.30 MILD PERSISTENT ASTHMA WITHOUT COMPLICATION: ICD-10-CM

## 2024-02-06 DIAGNOSIS — E55.9 VITAMIN D DEFICIENCY: ICD-10-CM

## 2024-02-06 DIAGNOSIS — N18.4 CKD (CHRONIC KIDNEY DISEASE) STAGE 4, GFR 15-29 ML/MIN (H): ICD-10-CM

## 2024-02-06 DIAGNOSIS — I10 ESSENTIAL HYPERTENSION: ICD-10-CM

## 2024-02-06 DIAGNOSIS — K21.9 GASTROESOPHAGEAL REFLUX DISEASE WITHOUT ESOPHAGITIS: ICD-10-CM

## 2024-02-06 DIAGNOSIS — E78.2 MIXED HYPERLIPIDEMIA: ICD-10-CM

## 2024-02-06 DIAGNOSIS — E11.9 INSULIN DEPENDENT TYPE 2 DIABETES MELLITUS (H): Primary | ICD-10-CM

## 2024-02-06 DIAGNOSIS — E03.9 HYPOTHYROIDISM, UNSPECIFIED TYPE: ICD-10-CM

## 2024-02-06 PROCEDURE — 99607 MTMS BY PHARM ADDL 15 MIN: CPT | Mod: 93 | Performed by: PHARMACIST

## 2024-02-06 PROCEDURE — 99605 MTMS BY PHARM NP 15 MIN: CPT | Mod: 93 | Performed by: PHARMACIST

## 2024-02-06 NOTE — LETTER
February 6, 2024  Mariama BELLO Erdisamar  [address]  [place] [address]    Dear Ms. Keene, [facility]     Thank you for talking with me on Feb 6, 2024 about your health and medications. As a follow-up to our conversation, I have included two documents:      Your Recommended To-Do List has steps you should take to get the best results from your medications.  Your Medication List will help you keep track of your medications and how to take them.    If you want to talk about these documents, please call Evelyn Odell RPH at phone: [phone], Monday-Friday 8-4:30pm.    I look forward to working with you and your doctors to make sure your medications work well for you.    Sincerely,  Evelyn Odell RPH MT Pharmacist, [facility]

## 2024-02-06 NOTE — LETTER
"Recommended To-Do List      Prepared on: 02/06/2024       You can get the best results from your medications by completing the items on this \"To-Do List.\"      Bring your To-Do List when you go to your doctor. And, share it with your family or caregivers.    My To-Do List:  What we talked about: What I should do:   A new medication that may be of benefit to you    At your appointment with your PCP discuss your ongoing congestion and trouble breathing.            What we talked about: What I should do:                     "

## 2024-02-06 NOTE — LETTER
_  Medication List        Prepared on: 02/06/2024     Bring your Medication List when you go to the doctor, hospital, or   emergency room. And, share it with your family or caregivers.     Note any changes to how you take your medications.  Cross out medications when you no longer use them.    Medication How I take it Why I use it Prescriber   albuterol (PROAIR HFA/PROVENTIL HFA/VENTOLIN HFA) 108 (90 Base) MCG/ACT inhaler Inhale 2 puffs into the lungs every 4 hours as needed for shortness of breath, wheezing or cough Asthma ALEAH Connor   amLODIPine (NORVASC) 5 MG tablet Take 5 mg by mouth daily High Blood Pressure Disorder ALEAH Connor   aspirin (ASA) 81 MG EC tablet Take 1 tablet (81 mg) by mouth daily Branch retinal artery occlusion, unspecified laterality Lewis Trinidad DO   Cholecalciferol (VITAMIN D3) 50 MCG (2000 UT) CAPS Take 1 capsule by mouth daily General Health Patient Reported   Continuous Blood Gluc Sensor (FREESTYLE TEMI 2 SENSOR) MISC Change every 14 days. Type 2 Diabetes ALEAH Connor   furosemide (LASIX) 40 MG tablet Take 40 mg by mouth 2 times daily Edema; Kidney Disease Mahsa Teixeira PA-C   levothyroxine (SYNTHROID/LEVOTHROID) 100 MCG tablet Take 1 tablet (100 mcg) by mouth daily Acquired Hypothyroidism Kadie Jones MD   losartan (COZAAR) 50 MG tablet Take 50 mg by mouth daily  Blood pressure Светлана Ott   metoprolol tartrate (LOPRESSOR) 100 MG tablet 100 mg total by mouth 2 (two) times a day. Essential Hypertension, Benign Didier Urban MD   NOVOLOG FLEXPEN 100 UNIT/ML soln Inject 15 Units Subcutaneous 3 times daily (with meals) , plus sliding [id number]- 1 uwej184-158- 2 axxmu306-103- 3 fnjkk785-563- 4 dxbms513-302- 5 units 276-300- 6 ptgev347-635- 7 ktfbp621-744- 8 qmfor995-152- 9 units Type 2 Diabetes ALEAH Connor   omeprazole (PRILOSEC) 20 MG capsule Take 1 capsule (20 mg total) by mouth daily before breakfast. Gastroesophageal Reflux Disease  without Esophagitis Didier Urban MD   rosuvastatin (CRESTOR) 5 MG tablet Take 1 tablet (5 mg total) by mouth daily. Mixed Hyperlipidemia Didier Urban MD   TRESIBA FLEXTOUCH 100 UNIT/ML pen Inject 52 Units Subcutaneous daily Type 2 Diabetes ALEAH Connor         Add new medications, over-the-counter drugs, herbals, vitamins, or  minerals in the blank rows below.    Medication How I take it Why I use it Prescriber                                      Allergies:      alprazolam; cephalexin; exenatide; hydralazine; iodinated contrast media [iodinated contrast media]; lisinopril; nitrofurantoin monohyd/m-cryst [nitrofurantoin]; sulfa (sulfonamide antibiotics) [sulfa antibiotics]        Side effects I have had:               Other Information:              My notes and questions:

## 2024-02-22 ENCOUNTER — TRANSFERRED RECORDS (OUTPATIENT)
Dept: HEALTH INFORMATION MANAGEMENT | Facility: CLINIC | Age: 69
End: 2024-02-22

## 2024-02-25 ENCOUNTER — APPOINTMENT (OUTPATIENT)
Dept: RADIOLOGY | Facility: HOSPITAL | Age: 69
End: 2024-02-25
Attending: STUDENT IN AN ORGANIZED HEALTH CARE EDUCATION/TRAINING PROGRAM
Payer: COMMERCIAL

## 2024-02-25 ENCOUNTER — HOSPITAL ENCOUNTER (EMERGENCY)
Facility: HOSPITAL | Age: 69
Discharge: HOME | End: 2024-02-25
Attending: STUDENT IN AN ORGANIZED HEALTH CARE EDUCATION/TRAINING PROGRAM | Admitting: STUDENT IN AN ORGANIZED HEALTH CARE EDUCATION/TRAINING PROGRAM
Payer: COMMERCIAL

## 2024-02-25 VITALS
DIASTOLIC BLOOD PRESSURE: 81 MMHG | WEIGHT: 206.3 LBS | TEMPERATURE: 98.4 F | HEIGHT: 63 IN | RESPIRATION RATE: 20 BRPM | SYSTOLIC BLOOD PRESSURE: 163 MMHG | HEART RATE: 79 BPM | OXYGEN SATURATION: 95 % | BODY MASS INDEX: 36.55 KG/M2

## 2024-02-25 DIAGNOSIS — R60.0 BILATERAL LOWER EXTREMITY EDEMA: ICD-10-CM

## 2024-02-25 DIAGNOSIS — J45.31 MILD PERSISTENT ASTHMA WITH EXACERBATION: ICD-10-CM

## 2024-02-25 LAB
ANION GAP SERPL CALCULATED.3IONS-SCNC: 11 MMOL/L (ref 7–15)
BASOPHILS # BLD AUTO: 0 10E3/UL (ref 0–0.2)
BASOPHILS NFR BLD AUTO: 0 %
BUN SERPL-MCNC: 50.7 MG/DL (ref 8–23)
CALCIUM SERPL-MCNC: 8.8 MG/DL (ref 8.8–10.2)
CHLORIDE SERPL-SCNC: 105 MMOL/L (ref 98–107)
CREAT SERPL-MCNC: 2.32 MG/DL (ref 0.51–0.95)
DEPRECATED HCO3 PLAS-SCNC: 26 MMOL/L (ref 22–29)
EGFRCR SERPLBLD CKD-EPI 2021: 22 ML/MIN/1.73M2
EOSINOPHIL # BLD AUTO: 0.1 10E3/UL (ref 0–0.7)
EOSINOPHIL NFR BLD AUTO: 2 %
ERYTHROCYTE [DISTWIDTH] IN BLOOD BY AUTOMATED COUNT: 13.1 % (ref 10–15)
GLUCOSE SERPL-MCNC: 181 MG/DL (ref 70–99)
HCT VFR BLD AUTO: 34.5 % (ref 35–47)
HGB BLD-MCNC: 11.7 G/DL (ref 11.7–15.7)
HOLD SPECIMEN: NORMAL
HOLD SPECIMEN: NORMAL
IMM GRANULOCYTES # BLD: 0 10E3/UL
IMM GRANULOCYTES NFR BLD: 0 %
LYMPHOCYTES # BLD AUTO: 1.2 10E3/UL (ref 0.8–5.3)
LYMPHOCYTES NFR BLD AUTO: 22 %
MCH RBC QN AUTO: 30.7 PG (ref 26.5–33)
MCHC RBC AUTO-ENTMCNC: 33.9 G/DL (ref 31.5–36.5)
MCV RBC AUTO: 91 FL (ref 78–100)
MONOCYTES # BLD AUTO: 0.5 10E3/UL (ref 0–1.3)
MONOCYTES NFR BLD AUTO: 8 %
NEUTROPHILS # BLD AUTO: 3.8 10E3/UL (ref 1.6–8.3)
NEUTROPHILS NFR BLD AUTO: 68 %
NRBC # BLD AUTO: 0 10E3/UL
NRBC BLD AUTO-RTO: 0 /100
NT-PROBNP SERPL-MCNC: 1410 PG/ML (ref 0–900)
PLATELET # BLD AUTO: 160 10E3/UL (ref 150–450)
POTASSIUM SERPL-SCNC: 4.7 MMOL/L (ref 3.4–5.3)
RBC # BLD AUTO: 3.81 10E6/UL (ref 3.8–5.2)
SARS-COV-2 RNA RESP QL NAA+PROBE: NEGATIVE
SODIUM SERPL-SCNC: 142 MMOL/L (ref 135–145)
TROPONIN T SERPL HS-MCNC: 53 NG/L
TROPONIN T SERPL HS-MCNC: 54 NG/L
WBC # BLD AUTO: 5.6 10E3/UL (ref 4–11)

## 2024-02-25 PROCEDURE — 87635 SARS-COV-2 COVID-19 AMP PRB: CPT | Performed by: STUDENT IN AN ORGANIZED HEALTH CARE EDUCATION/TRAINING PROGRAM

## 2024-02-25 PROCEDURE — 83880 ASSAY OF NATRIURETIC PEPTIDE: CPT | Performed by: STUDENT IN AN ORGANIZED HEALTH CARE EDUCATION/TRAINING PROGRAM

## 2024-02-25 PROCEDURE — 80048 BASIC METABOLIC PNL TOTAL CA: CPT | Performed by: STUDENT IN AN ORGANIZED HEALTH CARE EDUCATION/TRAINING PROGRAM

## 2024-02-25 PROCEDURE — 93005 ELECTROCARDIOGRAM TRACING: CPT | Performed by: STUDENT IN AN ORGANIZED HEALTH CARE EDUCATION/TRAINING PROGRAM

## 2024-02-25 PROCEDURE — 71046 X-RAY EXAM CHEST 2 VIEWS: CPT

## 2024-02-25 PROCEDURE — 99285 EMERGENCY DEPT VISIT HI MDM: CPT | Mod: 25

## 2024-02-25 PROCEDURE — 85025 COMPLETE CBC W/AUTO DIFF WBC: CPT | Performed by: STUDENT IN AN ORGANIZED HEALTH CARE EDUCATION/TRAINING PROGRAM

## 2024-02-25 PROCEDURE — 36415 COLL VENOUS BLD VENIPUNCTURE: CPT | Performed by: STUDENT IN AN ORGANIZED HEALTH CARE EDUCATION/TRAINING PROGRAM

## 2024-02-25 PROCEDURE — 250N000012 HC RX MED GY IP 250 OP 636 PS 637: Performed by: STUDENT IN AN ORGANIZED HEALTH CARE EDUCATION/TRAINING PROGRAM

## 2024-02-25 PROCEDURE — 250N000009 HC RX 250: Performed by: STUDENT IN AN ORGANIZED HEALTH CARE EDUCATION/TRAINING PROGRAM

## 2024-02-25 PROCEDURE — 84484 ASSAY OF TROPONIN QUANT: CPT | Performed by: STUDENT IN AN ORGANIZED HEALTH CARE EDUCATION/TRAINING PROGRAM

## 2024-02-25 PROCEDURE — 94640 AIRWAY INHALATION TREATMENT: CPT

## 2024-02-25 RX ORDER — PREDNISONE 20 MG/1
TABLET ORAL
Qty: 10 TABLET | Refills: 0 | Status: SHIPPED | OUTPATIENT
Start: 2024-02-25 | End: 2024-04-03

## 2024-02-25 RX ORDER — PREDNISONE 20 MG/1
60 TABLET ORAL DAILY
Status: DISCONTINUED | OUTPATIENT
Start: 2024-02-25 | End: 2024-02-25 | Stop reason: HOSPADM

## 2024-02-25 RX ORDER — IPRATROPIUM BROMIDE AND ALBUTEROL SULFATE 2.5; .5 MG/3ML; MG/3ML
3 SOLUTION RESPIRATORY (INHALATION) ONCE
Status: COMPLETED | OUTPATIENT
Start: 2024-02-25 | End: 2024-02-25

## 2024-02-25 RX ADMIN — IPRATROPIUM BROMIDE AND ALBUTEROL SULFATE 3 ML: .5; 3 SOLUTION RESPIRATORY (INHALATION) at 16:15

## 2024-02-25 RX ADMIN — PREDNISONE 60 MG: 20 TABLET ORAL at 16:10

## 2024-02-25 ASSESSMENT — ACTIVITIES OF DAILY LIVING (ADL)
ADLS_ACUITY_SCORE: 35

## 2024-02-25 ASSESSMENT — COLUMBIA-SUICIDE SEVERITY RATING SCALE - C-SSRS
1. IN THE PAST MONTH, HAVE YOU WISHED YOU WERE DEAD OR WISHED YOU COULD GO TO SLEEP AND NOT WAKE UP?: NO
2. HAVE YOU ACTUALLY HAD ANY THOUGHTS OF KILLING YOURSELF IN THE PAST MONTH?: NO
6. HAVE YOU EVER DONE ANYTHING, STARTED TO DO ANYTHING, OR PREPARED TO DO ANYTHING TO END YOUR LIFE?: NO

## 2024-02-25 NOTE — ED PROVIDER NOTES
EMERGENCY DEPARTMENT ENCOUNTER      NAME: Mariama Keene  AGE: 68 year old female  YOB: 1955  MRN: [id number]  EVALUATION DATE & TIME: 2/25/2024 11:57 AM    PCP: [place] Nixon    ED PROVIDER: German Hutchinson MD      Chief Complaint   Patient presents with    Shortness of Breath         FINAL IMPRESSION:  1. Mild persistent asthma with exacerbation          ED COURSE & MEDICAL DECISION MAKING:    Pertinent Labs & Imaging studies reviewed. (See chart for details)  68 year old female presents to the Emergency Department for evaluation of shortness of breath.    ED Course as of 02/25/24 1544   Sun Feb 25, 2024   1229 Patient is a 68-year-old female with history of asthma, hyperlipidemia, hypertension and type 2 diabetes who presents emergency department for evaluation of shortness of breath.  Patient states has been feeling somewhat short of breath for about a year but it has been progressing over the past several months was particular about the past 2 days.  Notes that she feels short of breath when she is walking around her house.  No associated chest pain.  She has had some chronic lower extremity edema she states over the past year that has not worsened recently.  Denies any associated nausea or vomiting.  No fevers at home although she has had a cough for the past few days.  He denies any history of blood clots.  Does not smoke.    On exam patient is well-appearing in no acute distress.  Notably hypertensive but otherwise hemodynamically stable and saturating well on room air.  Heart rate is regular, regular rhythm, 2+ right radial pulse, is 2-3+ pitting edema up to the tibial tuberosity of both legs.  No erythema.  Abdomen is soft and benign.    Initial differential is broad but includes not limited to ACS, heart failure exacerbation, or possible viral infection.  No significant wheezing on initial evaluation.  Without any significant tachycardia or hypoxia and given duration of her symptoms I have a  lower suspicion for acute pulmonary embolism.    Will obtain blood work including troponin along with a BNP.  EKG and chest x-ray.   1538 Labs reviewed interpreted myself.  CBC is reassuring without any significant leukocytosis.  BMP shows baseline renal function without any significant electrolyte abnormalities.  BNP is elevated but actually below the patient's  .  Troponin also slightly elevated at 54 but downtrending to 53 on repeat this is also due to the patient's baseline.   1539 EKG shows a sinus rhythm at 64 beats a minute, left axis deviation, normal [place], QRS and QTc durations, no ST elevations or acute ischemic T wave changes.   1542 Chest x-ray does not show any signs of significant pulmonary edema or pneumonia.    Updated patient about her findings.  She has remained on room air here without any hypoxia or hemodynamic instability.  She does have chronic lower extremity edema which I discussed with patient she should continue her diuretics and may add on compressive stockings to see if this helps with her lower extremity edema.  Upon repeat exam she did have some increased coughing and now has faint wheezes.  Will give her a DuoNeb treatment as well as a dose of prednisone here but overall presentation is consistent with a mild asthma exacerbation and she will likely be safe for outpatient management upon completion of DuoNeb here.  Stressed the importance of close follow-up with her primary care doctor given the long duration of her symptoms of shortness of breath over the past year or so as well as following up with her pulmonologist who she has not seen her for the past year and a half.       Medical Decision Making    History:  Supplemental history from: Documented in chart  External Record(s) reviewed: Documented in chart    Work Up:  Chart documentation includes differential considered and any EKGs or imaging independently interpreted by provider, where specified.  In additional to work up  documented, I considered the following work up: Documented in chart, if applicable.    External consultation:  Discussion of management with another provider: Documented in chart, if applicable    Complicating factors:  Care impacted by chronic illness: Chronic Lung Disease  Care affected by social determinants of health: N/A    Disposition considerations: Admission considered. Patient was signed out to the oncoming physician, disposition pending.       At the conclusion of the encounter I discussed the results of all of the tests and the disposition. The questions were answered. The patient or family acknowledged understanding and was agreeable with the care plan.     0 minutes of critical care time     MEDICATIONS GIVEN IN THE EMERGENCY:  Medications   ipratropium - albuterol 0.5 mg/2.5 mg/3 mL (DUONEB) neb solution 3 mL (has no administration in time range)   predniSONE (DELTASONE) tablet 60 mg (has no administration in time range)       NEW PRESCRIPTIONS STARTED AT TODAY'S ER VISIT  New Prescriptions    PREDNISONE (DELTASONE) 20 MG TABLET    Take two tablets (= 40mg) each day for 5 (five) days          =================================================================    HPI    Patient information was obtained from: Patient    Patient is a 68-year-old female with history of asthma, hyperlipidemia, hypertension and type 2 diabetes who presents emergency department for evaluation of shortness of breath.  Patient states has been feeling somewhat short of breath for about a year but it has been progressing over the past several months was particular about the past 2 days.  Notes that she feels short of breath when she is walking around her house.  No associated chest pain.  She has had some chronic lower extremity edema she states over the past year that has not worsened recently.  Denies any associated nausea or vomiting.  No fevers at home although she has had a cough for the past few days.  He denies any history  of blood clots.  Does not smoke.    REVIEW OF SYSTEMS   Refer to the HPI    PAST MEDICAL HISTORY:  Past Medical History:   Diagnosis Date    Asthma     Asthma     Asthma     Diabetes (H)     Diabetes (H)     Diabetes (H)     Hypertension     Hypertension     Hypertension     Panic attacks     Panic attacks     Panic attacks        PAST SURGICAL HISTORY:  Past Surgical History:   Procedure Laterality Date    ABSCESS DRAINAGE      On back    HYSTERECTOMY      age 33 - partial    NEPHRECTOMY PARTIAL Right     OOPHORECTOMY      age 48           CURRENT MEDICATIONS:    predniSONE (DELTASONE) 20 MG tablet  albuterol (PROAIR HFA/PROVENTIL HFA/VENTOLIN HFA) 108 (90 Base) MCG/ACT inhaler  amLODIPine (NORVASC) 5 MG tablet  aspirin (ASA) 81 MG EC tablet  BD PEN NEEDLE ALEJANDRO 2ND GEN 32G X 4 MM miscellaneous  Cholecalciferol (VITAMIN D3) 50 MCG (2000 UT) CAPS  Continuous Blood Gluc Sensor (FREESTYLE TEMI 2 SENSOR) MISC  furosemide (LASIX) 40 MG tablet  Lancets (ONETOUCH DELICA PLUS [id number]) MISC  levothyroxine (SYNTHROID/LEVOTHROID) 100 MCG tablet  losartan (COZAAR) 50 MG tablet  metoprolol tartrate (LOPRESSOR) 50 MG tablet  NOVOLOG FLEXPEN 100 UNIT/ML soln  omeprazole (PRILOSEC) 20 MG capsule  ONETOUCH VERIO IQ test strip  rosuvastatin (CRESTOR) 5 MG tablet  TRESIBA FLEXTOUCH 100 UNIT/ML pen        ALLERGIES:  Allergies   Allergen Reactions    Alprazolam Other (See Comments)    Cephalexin Rash and Swelling    Exenatide Other (See Comments)    Hydralazine      Flush and Elevated BP     Iodinated Contrast Media [Iodinated Contrast Media] Unknown    Lisinopril Other (See Comments)    Nitrofurantoin Monohyd/M-Cryst [Nitrofurantoin] Rash    Sulfa (Sulfonamide Antibiotics) [Sulfa Antibiotics] Other (See Comments)       FAMILY HISTORY:  Family History   Problem Relation Age of Onset    Breast Cancer Maternal Aunt 45.00    Alzheimer Disease Mother     Hypertension Mother     Chronic Obstructive Pulmonary Disease Father        SOCIAL  "HISTORY:   Social History     Socioeconomic History    Marital status:    Tobacco Use    Smoking status: Former     Types: Cigarettes     Quit date: 1992     Years since quittin.1    Smokeless tobacco: Never   Substance and Sexual Activity    Alcohol use: Yes    Drug use: Never    Sexual activity: Not Currently     Birth control/protection: Surgical       VITALS:  BP (!) 166/72   Pulse 67   Temp 98.4  F (36.9  C) (Oral)   Resp 16   Ht 1.6 m (5' 3\")   Wt 93.6 kg (206 lb 4.8 oz)   SpO2 98%   BMI 36.54 kg/m      PHYSICAL EXAM    Constitutional: Well developed, Well nourished, NAD,  HENT: Normocephalic, Atraumatic,  mucous membranes moist,   Neck- trachea midline, No stridor.    Eyes:EOMI, Conjunctiva normal, No discharge.   Respiratory: Normal breath sounds, No respiratory distress, No wheezing.  Occasional coughing during exam  Cardiovascular: Normal heart rate, Regular rhythm,  No murmurs, bilateral 3+ pitting edema up to the tibial tuberosities  Abdominal: Soft, No tenderness, No rebound or guarding.     Musculoskeletal: no deformity or malalignment   Integument: Warm, Dry, No erythema,    Neurologic: Alert & oriented x 3   Psychiatric: Affect normal, Cooperative.      LAB:  All pertinent labs reviewed and interpreted.  Results for orders placed or performed during the hospital encounter of 24   XR Chest 2 Views    Impression    IMPRESSION:     Lungs are clear. No evidence of pneumonia. No pleural effusions or pneumothorax. Normal pulmonary vascularity. Nonenlarged cardiac silhouette. Multilevel degenerative changes of the spine.   Basic metabolic panel   Result Value Ref Range    Sodium 142 135 - 145 mmol/L    Potassium 4.7 3.4 - 5.3 mmol/L    Chloride 105 98 - 107 mmol/L    Carbon Dioxide (CO2) 26 22 - 29 mmol/L    Anion Gap 11 7 - 15 mmol/L    Urea Nitrogen 50.7 (H) 8.0 - 23.0 mg/dL    Creatinine 2.32 (H) 0.51 - 0.95 mg/dL    GFR Estimate 22 (L) >60 mL/min/1.73m2    Calcium 8.8 8.8 - " 10.2 mg/dL    Glucose 181 (H) 70 - 99 mg/dL   Result Value Ref Range    Troponin T, High Sensitivity 54 (H) <=14 ng/L   Nt probnp inpatient (BNP)   Result Value Ref Range    N terminal Pro BNP Inpatient 1,410 (H) 0 - 900 pg/mL   CBC with platelets and differential   Result Value Ref Range    WBC Count 5.6 4.0 - 11.0 10e3/uL    RBC Count 3.81 3.80 - 5.20 10e6/uL    Hemoglobin 11.7 11.7 - 15.7 g/dL    Hematocrit 34.5 (L) 35.0 - 47.0 %    MCV 91 78 - 100 fL    MCH 30.7 26.5 - 33.0 pg    MCHC 33.9 31.5 - 36.5 g/dL    RDW 13.1 10.0 - 15.0 %    Platelet Count 160 150 - 450 10e3/uL    % Neutrophils 68 %    % Lymphocytes 22 %    % Monocytes 8 %    % Eosinophils 2 %    % Basophils 0 %    % Immature Granulocytes 0 %    NRBCs per 100 WBC 0 <1 /100    Absolute Neutrophils 3.8 1.6 - 8.3 10e3/uL    Absolute Lymphocytes 1.2 0.8 - 5.3 10e3/uL    Absolute Monocytes 0.5 0.0 - 1.3 10e3/uL    Absolute Eosinophils 0.1 0.0 - 0.7 10e3/uL    Absolute Basophils 0.0 0.0 - 0.2 10e3/uL    Absolute Immature Granulocytes 0.0 <=0.4 10e3/uL    Absolute NRBCs 0.0 10e3/uL   Extra Blue Top Tube   Result Value Ref Range    Hold Specimen JI    Extra Red Top Tube   Result Value Ref Range    Hold Specimen [facility]    Symptomatic COVID-19 Virus (Coronavirus) by PCR Nasopharyngeal    Specimen: Nasopharyngeal; Swab   Result Value Ref Range    SARS CoV2 PCR Negative Negative   Result Value Ref Range    Troponin T, High Sensitivity 53 (H) <=14 ng/L       RADIOLOGY:  Reviewed all pertinent imaging. Please see official radiology report.  XR Chest 2 Views   Final Result   IMPRESSION:       Lungs are clear. No evidence of pneumonia. No pleural effusions or pneumothorax. Normal pulmonary vascularity. Nonenlarged cardiac silhouette. Multilevel degenerative changes of the spine.          EKG:    Performed at: 11:55 AM    Impression: EKG shows a sinus rhythm at 64 beats a minute, left axis deviation, normal [place], QRS and QTc durations, no ST elevation or acute ischemic T  wave changes, poor R wave progression through the precordium likely suggesting old anterior disease        I have independently reviewed and interpreted the EKG(s) documented above.    PROCEDURES:         RASIL[employer] SYSTEM[employer] System Documentation:   CMS Diagnoses:   German Hutchinson MD  [facility] EMERGENCY DEPARTMENT  [address] [place] [id number]  [phone]      German Hutchinson MD  02/25/24 9957

## 2024-02-25 NOTE — ED TRIAGE NOTES
"Patient reports increased SOB for \"at least a month\", \"hard to catch a breath\".  Pt also c/o increased bilateral leg swelling and weight gain.        "

## 2024-02-25 NOTE — ED PROVIDER NOTES
eMERGENCY dEPARTMENT PROGRESS NOTE         ED COURSE AND MEDICAL DECISION MAKING  Patient was signed out to me by German Hutchinson MD at 3:30 PM      Mariama Keene is a 68 year old female who presents for evaluation of chronic shortness of breath    Patient had been evaluated and had developed some wheezing later in her course.  She was getting get a DuoNeb and some steroids and then discharged home after that.  She got the DuoNeb and felt better and was ready for discharge.    LAB  Pertinent labs results reviewed   Labs Ordered and Resulted from Time of ED Arrival to Time of ED Departure   BASIC METABOLIC PANEL - Abnormal       Result Value    Sodium 142      Potassium 4.7      Chloride 105      Carbon Dioxide (CO2) 26      Anion Gap 11      Urea Nitrogen 50.7 (*)     Creatinine 2.32 (*)     GFR Estimate 22 (*)     Calcium 8.8      Glucose 181 (*)    TROPONIN T, HIGH SENSITIVITY - Abnormal    Troponin T, High Sensitivity 54 (*)    NT PROBNP INPATIENT - Abnormal    N terminal Pro BNP Inpatient 1,410 (*)    CBC WITH PLATELETS AND DIFFERENTIAL - Abnormal    WBC Count 5.6      RBC Count 3.81      Hemoglobin 11.7      Hematocrit 34.5 (*)     MCV 91      MCH 30.7      MCHC 33.9      RDW 13.1      Platelet Count 160      % Neutrophils 68      % Lymphocytes 22      % Monocytes 8      % Eosinophils 2      % Basophils 0      % Immature Granulocytes 0      NRBCs per 100 WBC 0      Absolute Neutrophils 3.8      Absolute Lymphocytes 1.2      Absolute Monocytes 0.5      Absolute Eosinophils 0.1      Absolute Basophils 0.0      Absolute Immature Granulocytes 0.0      Absolute NRBCs 0.0     TROPONIN T, HIGH SENSITIVITY - Abnormal    Troponin T, High Sensitivity 53 (*)    COVID-19 VIRUS (CORONAVIRUS) BY PCR - Normal    SARS CoV2 PCR Negative             RADIOLOGY    Pertinent imaging reviewed   Please see official radiology report.  XR Chest 2 Views   Final Result   IMPRESSION:       Lungs are clear. No evidence of pneumonia. No  pleural effusions or pneumothorax. Normal pulmonary vascularity. Nonenlarged cardiac silhouette. Multilevel degenerative changes of the spine.          FINAL IMPRESSION    1. Mild persistent asthma with exacerbation    2. Bilateral lower extremity edema         DISCHARGE MEDICATIONS  New Prescriptions    PREDNISONE (DELTASONE) 20 MG TABLET    Take two tablets (= 40mg) each day for 5 (five) days        Bhavani Tyson MD  02/25/24 0230

## 2024-03-01 LAB
ATRIAL RATE - MUSE: 64 BPM
DIASTOLIC BLOOD PRESSURE - MUSE: NORMAL MMHG
INTERPRETATION ECG - MUSE: NORMAL
P AXIS - MUSE: 61 DEGREES
PR INTERVAL - MUSE: 186 MS
QRS DURATION - MUSE: 100 MS
QT - MUSE: 432 MS
QTC - MUSE: 445 MS
R AXIS - MUSE: -53 DEGREES
SYSTOLIC BLOOD PRESSURE - MUSE: NORMAL MMHG
T AXIS - MUSE: 65 DEGREES
VENTRICULAR RATE- MUSE: 64 BPM

## 2024-03-11 ENCOUNTER — LAB REQUISITION (OUTPATIENT)
Dept: LAB | Facility: CLINIC | Age: 69
End: 2024-03-11
Payer: COMMERCIAL

## 2024-03-11 DIAGNOSIS — N90.89 OTHER SPECIFIED NONINFLAMMATORY DISORDERS OF VULVA AND PERINEUM: ICD-10-CM

## 2024-03-11 PROCEDURE — 88341 IMHCHEM/IMCYTCHM EA ADD ANTB: CPT | Performed by: STUDENT IN AN ORGANIZED HEALTH CARE EDUCATION/TRAINING PROGRAM

## 2024-03-14 ENCOUNTER — TRANSFERRED RECORDS (OUTPATIENT)
Dept: HEALTH INFORMATION MANAGEMENT | Facility: CLINIC | Age: 69
End: 2024-03-14
Payer: COMMERCIAL

## 2024-03-15 LAB
LAB DIRECTOR COMMENTS: NORMAL
LAB DIRECTOR DISCLAIMER: NORMAL
LAB DIRECTOR INTERPRETATION: NORMAL
LAB DIRECTOR METHODOLOGY: NORMAL
LAB DIRECTOR RESULTS: NORMAL
PATH REPORT.COMMENTS IMP SPEC: ABNORMAL
PATH REPORT.COMMENTS IMP SPEC: YES
PATH REPORT.FINAL DX SPEC: ABNORMAL
PATH REPORT.GROSS SPEC: ABNORMAL
PATH REPORT.MICROSCOPIC SPEC OTHER STN: ABNORMAL
PATH REPORT.RELEVANT HX SPEC: ABNORMAL
PHOTO IMAGE: ABNORMAL
SPECIMEN DESCRIPTION: NORMAL

## 2024-03-15 PROCEDURE — G0452 MOLECULAR PATHOLOGY INTERPR: HCPCS | Performed by: PATHOLOGY

## 2024-03-15 PROCEDURE — 87624 HPV HI-RISK TYP POOLED RSLT: CPT | Performed by: OBSTETRICS & GYNECOLOGY

## 2024-03-21 ENCOUNTER — TRANSFERRED RECORDS (OUTPATIENT)
Dept: HEALTH INFORMATION MANAGEMENT | Facility: CLINIC | Age: 69
End: 2024-03-21
Payer: COMMERCIAL

## 2024-03-29 ENCOUNTER — TRANSFERRED RECORDS (OUTPATIENT)
Dept: HEALTH INFORMATION MANAGEMENT | Facility: CLINIC | Age: 69
End: 2024-03-29
Payer: COMMERCIAL

## 2024-03-29 ENCOUNTER — TRANSCRIBE ORDERS (OUTPATIENT)
Dept: VASCULAR SURGERY | Facility: CLINIC | Age: 69
End: 2024-03-29
Payer: COMMERCIAL

## 2024-03-29 DIAGNOSIS — I89.0 LYMPHEDEMA: Primary | ICD-10-CM

## 2024-04-03 ENCOUNTER — OFFICE VISIT (OUTPATIENT)
Dept: CARDIOLOGY | Facility: CLINIC | Age: 69
End: 2024-04-03
Payer: COMMERCIAL

## 2024-04-03 VITALS
HEART RATE: 67 BPM | WEIGHT: 196 LBS | BODY MASS INDEX: 34.73 KG/M2 | DIASTOLIC BLOOD PRESSURE: 74 MMHG | RESPIRATION RATE: 14 BRPM | HEIGHT: 63 IN | SYSTOLIC BLOOD PRESSURE: 164 MMHG

## 2024-04-03 DIAGNOSIS — R06.09 EXERTIONAL DYSPNEA: Primary | ICD-10-CM

## 2024-04-03 DIAGNOSIS — N18.4 TYPE 2 DIABETES MELLITUS WITH STAGE 4 CHRONIC KIDNEY DISEASE, WITHOUT LONG-TERM CURRENT USE OF INSULIN (H): ICD-10-CM

## 2024-04-03 DIAGNOSIS — E11.22 TYPE 2 DIABETES MELLITUS WITH STAGE 4 CHRONIC KIDNEY DISEASE, WITHOUT LONG-TERM CURRENT USE OF INSULIN (H): ICD-10-CM

## 2024-04-03 DIAGNOSIS — I10 PRIMARY HYPERTENSION: ICD-10-CM

## 2024-04-03 DIAGNOSIS — Z01.810 PRE-OPERATIVE CARDIOVASCULAR EXAMINATION: ICD-10-CM

## 2024-04-03 DIAGNOSIS — I50.32 CHRONIC DIASTOLIC CONGESTIVE HEART FAILURE (H): ICD-10-CM

## 2024-04-03 PROBLEM — N18.32 CHRONIC KIDNEY DISEASE, STAGE 3B (H): Status: ACTIVE | Noted: 2024-04-03

## 2024-04-03 PROCEDURE — 99214 OFFICE O/P EST MOD 30 MIN: CPT | Performed by: INTERNAL MEDICINE

## 2024-04-03 RX ORDER — ASPIRIN 325 MG
325 TABLET, DELAYED RELEASE (ENTERIC COATED) ORAL EVERY 6 HOURS PRN
COMMUNITY
End: 2024-05-21

## 2024-04-03 RX ORDER — FLUTICASONE PROPIONATE AND SALMETEROL 250; 50 UG/1; UG/1
1 POWDER RESPIRATORY (INHALATION) 2 TIMES DAILY
COMMUNITY
Start: 2024-02-29

## 2024-04-03 RX ORDER — MOMETASONE FUROATE MONOHYDRATE 50 UG/1
2 SPRAY, METERED NASAL DAILY PRN
COMMUNITY
Start: 2024-02-22

## 2024-04-03 RX ORDER — LORAZEPAM 0.5 MG/1
0.5 TABLET ORAL DAILY PRN
COMMUNITY
Start: 2024-03-14

## 2024-04-03 RX ORDER — LIDOCAINE 50 MG/G
OINTMENT TOPICAL 4 TIMES DAILY PRN
COMMUNITY
Start: 2024-03-11

## 2024-04-03 NOTE — PROGRESS NOTES
HEART CARE ENCOUNTER CONSULTATON NOTE      [facility]  [phone]      Assessment/Recommendations   Assessment:   1.  Chronic congestive heart failure secondary to heart failure with preserved ejection fraction with left ventricular hypertrophy related to hypertension.   2. Hypertension  3. HLD.  Statin intolerant  4. DM type II, uncontrolled.   Lab Results   Component Value Date    A1C 10.2 11/28/2022    A1C 10.3 08/17/2020     5. History of premature atrial contractions  6. CKD stage IV (GFR (22).     GFR Estimate   Date Value Ref Range Status   02/25/2024 22 (L) >60 mL/min/1.73m2 Final   04/07/2021 32 (L) >60 mL/min/1.73m2 Final     GFR Estimate If Black   Date Value Ref Range Status   04/07/2021 39 (L) >60 mL/min/1.73m2 Final   7. Asthma   8. 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  Increase Lasix to 80 mg in AM and 40 mg in PM  2.  If worsening lower extremity edema consider discontinuation of amlodipine  3. Amlodipine, losartan and metoprolol for HTN  4.   (history of retinal occlusion).     1.  Preoperative cardiovascular risk assessment: GYN/Onc surgery  Assessment of preoperative cardiac risk: He has no active cardiac conditions (unstable coronary syndromes, decompensated HF, significant arrhythmias, or severe valvular disease), has no known coronary artery disease, has 3 clinical risk factors (ischemic heart disease, prior heart failure, cerebrovascular disease, diabetes mellitus, and renal insufficiency), and has a functional capacity <4 than 4 METs.     Creatinine   Date Value Ref Range Status   02/25/2024 2.32 (H) 0.51 - 0.95 mg/dL Final       Surgical Recommendation(s):   1. Based on clinical risk and cardiac condition it is recommended that the patient stress testing prior to surgical intervention.   2.  Do not hold metoprolol for surgery  3.  Avoid excessive fluid resuscitation during surgery             History of Present Illness/Subjective   "  HPI: Mariama Keene is a 68 year old female with chronic kidney disease stage IV, diabetes mellitus which is uncontrolled, hypertension which has been uncontrolled for some time, asthma who presents to cardiology clinic in consultation for heart failure with preserved ejection fraction.    Since last clinical evaluation the patient has been diagnosed with GYN cancer.  She is to undergo surgical resection in mid April.  We have been asked to evaluate her for preoperative surgical risk factors.  Currently she has some lower extremity edema we will optimize with adjustments of Lasix.  Given her functional status and risk factors we are recommending she undergoes stress testing with Lexiscan or treadmill nuclear stress testing.    Currently she has chronic dyspnea on exertion which is improved since evaluation in the emergency department in February.  Asthma appears to be stable.  She does have chronic moderate pitting lower extremity edema.  She denies any chest pain.  No syncope.    Echocardiogram: 2022  Normal left ventricular size. Mild to moderate left ventricular hypertrophy.  Left ventricular function appears normal. Left ventricular ejection fraction  estimated 55 to 60%.Diastolic function appears abnormal.  Normal right ventricular size and systolic function.  Mild left atrial enlargement  No hemodynamically significant valve abnormality         Physical Examination  Review of Systems   Vitals: BP (!) 164/74 (BP Location: Right arm, Patient Position: Sitting, Cuff Size: Adult Large)   Pulse 67   Resp 14   Ht 1.6 m (5' 3\")   Wt 88.9 kg (196 lb)   BMI 34.72 kg/m    BMI= Body mass index is 34.72 kg/m .  Wt Readings from Last 3 Encounters:   04/03/24 88.9 kg (196 lb)   02/25/24 93.6 kg (206 lb 4.8 oz)   08/31/23 91.6 kg (202 lb)        Anxious but pleasant.   ENT/Mouth: membranes moist, no oral lesions or bleeding gums.      EYES:  no scleral icterus, normal conjunctivae       Chest/Lungs:   lungs are clear " to auscultation, no rales or wheezing, no sternal scar, equal chest wall expansion    Cardiovascular:   Regular. Normal first and second heart sounds with no murmurs, rubs, or gallops; the carotid, radial  pulses are intact, Jugular venous pressure normal, moderate (+3) pitting edema bilaterally    Abdomen:  no tenderness; bowel sounds are present   Extremities: no cyanosis or clubbing   Skin: no xanthelasma, warm.    Neurologic: normal  bilateral, no tremors     Psychiatric: alert and oriented x3, calm        Please refer above for cardiac ROS details.        Medical History  Surgical History Family History Social History   Past Medical History:   Diagnosis Date    Asthma     Asthma     Asthma     Diabetes (H)     Diabetes (H)     Diabetes (H)     Hypertension     Hypertension     Hypertension     Panic attacks     Panic attacks     Panic attacks      Past Surgical History:   Procedure Laterality Date    ABSCESS DRAINAGE      On back    HYSTERECTOMY      age 33 - partial    NEPHRECTOMY PARTIAL Right     OOPHORECTOMY      age 48     Family History   Problem Relation Age of Onset    Breast Cancer Maternal Aunt 45.00    Alzheimer Disease Mother     Hypertension Mother     Chronic Obstructive Pulmonary Disease Father         Social History     Socioeconomic History    Marital status:      Spouse name: Not on file    Number of children: Not on file    Years of education: Not on file    Highest education level: Not on file   Occupational History    Not on file   Tobacco Use    Smoking status: Former     Types: Cigarettes     Quit date: 1992     Years since quittin.2    Smokeless tobacco: Never   Substance and Sexual Activity    Alcohol use: Yes    Drug use: Never    Sexual activity: Not Currently     Birth control/protection: Surgical   Other Topics Concern    Not on file   Social History Narrative    Not on file     Social Determinants of Health     Financial Resource Strain: Not on file   Food  Insecurity: Not on file   Transportation Needs: Not on file   Physical Activity: Not on file   Stress: Not on file   Social Connections: Not on file   Interpersonal Safety: Not on file   Housing Stability: Not on file           Medications  Allergies   Current Outpatient Medications   Medication Sig Dispense Refill    albuterol (PROAIR HFA/PROVENTIL HFA/VENTOLIN HFA) 108 (90 Base) MCG/ACT inhaler Inhale 2 puffs into the lungs every 4 hours as needed for shortness of breath, wheezing or cough      amLODIPine (NORVASC) 5 MG tablet Take 5 mg by mouth daily      aspirin (ASA) 325 MG EC tablet Take 325 mg by mouth twice a week      BD PEN NEEDLE ALEJANDRO 2ND GEN 32G X 4 MM miscellaneous USE THREE TIMES DAILY WITH NOVOLOG.      Continuous Blood Gluc Sensor (FREESTYLE TEMI 2 SENSOR) MISC Change every 14 days.      furosemide (LASIX) 40 MG tablet Take 40 mg by mouth 2 times daily      Lancets (ONETOUCH DELICA PLUS [id number]) MISC TEST THREE TIMES DAILY      levothyroxine (SYNTHROID/LEVOTHROID) 100 MCG tablet Take 1 tablet (100 mcg) by mouth daily 30 tablet 0    lidocaine (XYLOCAINE) 5 % external ointment Apply topically 4 times daily as needed for moderate pain      LORazepam (ATIVAN) 0.5 MG tablet Take 0.5 mg by mouth daily as needed for anxiety      losartan (COZAAR) 50 MG tablet Take 50 mg by mouth daily      metoprolol tartrate (LOPRESSOR) 50 MG tablet [METOPROLOL TARTRATE (LOPRESSOR) 50 MG TABLET] Take 2 tablets (100 mg total) by mouth 2 (two) times a day. 60 tablet 0    NOVOLOG FLEXPEN 100 UNIT/ML soln Inject 15 Units Subcutaneous 3 times daily (with meals) , plus sliding scale  151-175- 1 unit  176-200- 2 units  201-225- 3 units  226-250- 4 units  251-275- 5 units   276-300- 6 units  301-325- 7 units  326-350- 8 units  351-375- 9 units      omeprazole (PRILOSEC) 20 MG capsule [OMEPRAZOLE (PRILOSEC) 20 MG CAPSULE] Take 1 capsule (20 mg total) by mouth daily before breakfast. 30 capsule 0    ONETOUCH VERIO IQ test strip  test three times daily      rosuvastatin (CRESTOR) 5 MG tablet [ROSUVASTATIN (CRESTOR) 5 MG TABLET] Take 1 tablet (5 mg total) by mouth daily. 30 tablet 0    TRESIBA FLEXTOUCH 100 UNIT/ML pen Inject 52 Units Subcutaneous daily      WIXELA INHUB 250-50 MCG/ACT inhaler Inhale 1 puff into the lungs 2 times daily      aspirin (ASA) 81 MG EC tablet Take 1 tablet (81 mg) by mouth daily (Patient not taking: Reported on 4/3/2024)      Cholecalciferol (VITAMIN D3) 50 MCG (2000 UT) CAPS Take 1 capsule by mouth daily (Patient not taking: Reported on 4/3/2024)      mometasone (NASONEX) 50 MCG/ACT nasal spray Spray 2 sprays into both nostrils daily (Patient not taking: Reported on 4/3/2024)         Allergies   Allergen Reactions    Alprazolam Other (See Comments)    Cephalexin Rash and Swelling    Exenatide Other (See Comments)    Hydralazine      Flush and Elevated BP     Iodinated Contrast Media [Iodinated Contrast Media] Unknown    Lisinopril Other (See Comments)    Nitrofurantoin Monohyd/M-Cryst [Nitrofurantoin] Rash    Sulfa (Sulfonamide Antibiotics) [Sulfa Antibiotics] Other (See Comments)          Lab Results    Chemistry/lipid CBC Cardiac Enzymes/BNP/TSH/INR   Recent Labs   Lab Test 12/08/22  0937   CHOL 238*   HDL 63   *   TRIG 191*     Recent Labs   Lab Test 12/08/22  0937 03/16/22  1011 07/14/21  1152   * 58 74     Recent Labs   Lab Test 12/28/22  1207   *   POTASSIUM 4.3   CHLORIDE 97*   CO2 26   *   BUN 36.5*   CR 2.13*   GFRESTIMATED 24*   BILL 9.0     Recent Labs   Lab Test 12/28/22  1207 12/06/22  1135 12/04/22  0624   CR 2.13* 2.00* 1.81*     Recent Labs   Lab Test 11/28/22  0835 08/17/20  1106   A1C 10.2* 10.3*          Recent Labs   Lab Test 12/04/22  0624 12/01/22  0658 11/29/22  0640   WBC  --   --  5.6   HGB  --   --  14.2   HCT  --   --  41.7   MCV  --   --  91      < > 157    < > = values in this interval not displayed.     Recent Labs   Lab Test 11/29/22  2603  11/28/22  0835 06/17/22  0130   HGB 14.2 13.2 14.2    Recent Labs   Lab Test 06/17/22  0130 08/19/20  1407 08/17/20  1106   TROPONINI 0.02 0.03 0.03     Recent Labs   Lab Test 11/28/22  0713   NTBNPI 2,370*     Recent Labs   Lab Test 12/06/22  1135   TSH 3.09     Recent Labs   Lab Test 11/28/22  0920 08/20/20  1026 08/19/20  1407   INR 1.00 1.03 0.93        Lewis Trinidad DO    Today's clinic visit entailed:  36 minutes spent by me on the date of the encounter doing chart review, history and exam, documentation and further activities per the note.

## 2024-04-03 NOTE — PATIENT INSTRUCTIONS
Please contact direct nurses line Monday through Friday 8 AM to 5 PM @ [phone]    After-hours contact cardiology office at [phone].    Plan:    Increase lasix (furosemide) to 80 mg twice daily for 2 days, then decrease to 80 mg in the morning and 40 mg in the afternoon.  Stress test

## 2024-04-03 NOTE — LETTER
4/3/2024    ALEAH Justice  [address] [place] [address]    RE: Mariama BELLO Chandlerisamar       Dear Colleague,     I had the pleasure of seeing Mariama Keene in the [facility].    HEART CARE ENCOUNTER CONSULTATON NOTE      [facility]  [phone]      Assessment/Recommendations   Assessment:   1.  Chronic congestive heart failure secondary to heart failure with preserved ejection fraction with left ventricular hypertrophy related to hypertension.   2. Hypertension  3. HLD.  Statin intolerant  4. DM type II, uncontrolled.   Lab Results   Component Value Date    A1C 10.2 11/28/2022    A1C 10.3 08/17/2020     5. History of premature atrial contractions  6. CKD stage IV (GFR (22).     GFR Estimate   Date Value Ref Range Status   02/25/2024 22 (L) >60 mL/min/1.73m2 Final   04/07/2021 32 (L) >60 mL/min/1.73m2 Final     GFR Estimate If Black   Date Value Ref Range Status   04/07/2021 39 (L) >60 mL/min/1.73m2 Final   7. Asthma   8. 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  Increase Lasix to 80 mg in AM and 40 mg in PM  2.  If worsening lower extremity edema consider discontinuation of amlodipine  3. Amlodipine, losartan and metoprolol for HTN  4.   (history of retinal occlusion).     1.  Preoperative cardiovascular risk assessment: GYN/Onc surgery  Assessment of preoperative cardiac risk: He has no active cardiac conditions (unstable coronary syndromes, decompensated HF, significant arrhythmias, or severe valvular disease), has no known coronary artery disease, has 3 clinical risk factors (ischemic heart disease, prior heart failure, cerebrovascular disease, diabetes mellitus, and renal insufficiency), and has a functional capacity <4 than 4 METs.     Creatinine   Date Value Ref Range Status   02/25/2024 2.32 (H) 0.51 - 0.95 mg/dL Final       Surgical Recommendation(s):   1. Based on clinical risk and cardiac condition it is recommended that the patient  "stress testing prior to surgical intervention.   2.  Do not hold metoprolol for surgery  3.  Avoid excessive fluid resuscitation during surgery             History of Present Illness/Subjective    HPI: Mariama Keene is a 68 year old female with chronic kidney disease stage IV, diabetes mellitus which is uncontrolled, hypertension which has been uncontrolled for some time, asthma who presents to cardiology clinic in consultation for heart failure with preserved ejection fraction.    Since last clinical evaluation the patient has been diagnosed with GYN cancer.  She is to undergo surgical resection in mid April.  We have been asked to evaluate her for preoperative surgical risk factors.  Currently she has some lower extremity edema we will optimize with adjustments of Lasix.  Given her functional status and risk factors we are recommending she undergoes stress testing with Lexiscan or treadmill nuclear stress testing.    Currently she has chronic dyspnea on exertion which is improved since evaluation in the emergency department in February.  Asthma appears to be stable.  She does have chronic moderate pitting lower extremity edema.  She denies any chest pain.  No syncope.    Echocardiogram: 2022  Normal left ventricular size. Mild to moderate left ventricular hypertrophy.  Left ventricular function appears normal. Left ventricular ejection fraction  estimated 55 to 60%.Diastolic function appears abnormal.  Normal right ventricular size and systolic function.  Mild left atrial enlargement  No hemodynamically significant valve abnormality         Physical Examination  Review of Systems   Vitals: BP (!) 164/74 (BP Location: Right arm, Patient Position: Sitting, Cuff Size: Adult Large)   Pulse 67   Resp 14   Ht 1.6 m (5' 3\")   Wt 88.9 kg (196 lb)   BMI 34.72 kg/m    BMI= Body mass index is 34.72 kg/m .  Wt Readings from Last 3 Encounters:   04/03/24 88.9 kg (196 lb)   02/25/24 93.6 kg (206 lb 4.8 oz)   08/31/23 91.6 " kg (202 lb)        Anxious but pleasant.   ENT/Mouth: membranes moist, no oral lesions or bleeding gums.      EYES:  no scleral icterus, normal conjunctivae       Chest/Lungs:   lungs are clear to auscultation, no rales or wheezing, no sternal scar, equal chest wall expansion    Cardiovascular:   Regular. Normal first and second heart sounds with no murmurs, rubs, or gallops; the carotid, radial  pulses are intact, Jugular venous pressure normal, moderate (+3) pitting edema bilaterally    Abdomen:  no tenderness; bowel sounds are present   Extremities: no cyanosis or clubbing   Skin: no xanthelasma, warm.    Neurologic: normal  bilateral, no tremors     Psychiatric: alert and oriented x3, calm        Please refer above for cardiac ROS details.        Medical History  Surgical History Family History Social History   Past Medical History:   Diagnosis Date    Asthma     Asthma     Asthma     Diabetes (H)     Diabetes (H)     Diabetes (H)     Hypertension     Hypertension     Hypertension     Panic attacks     Panic attacks     Panic attacks      Past Surgical History:   Procedure Laterality Date    ABSCESS DRAINAGE      On back    HYSTERECTOMY      age 33 - partial    NEPHRECTOMY PARTIAL Right     OOPHORECTOMY      age 48     Family History   Problem Relation Age of Onset    Breast Cancer Maternal Aunt 45.00    Alzheimer Disease Mother     Hypertension Mother     Chronic Obstructive Pulmonary Disease Father         Social History     Socioeconomic History    Marital status:      Spouse name: Not on file    Number of children: Not on file    Years of education: Not on file    Highest education level: Not on file   Occupational History    Not on file   Tobacco Use    Smoking status: Former     Types: Cigarettes     Quit date: 1992     Years since quittin.2    Smokeless tobacco: Never   Substance and Sexual Activity    Alcohol use: Yes    Drug use: Never    Sexual activity: Not Currently     Birth  control/protection: Surgical   Other Topics Concern    Not on file   Social History Narrative    Not on file     Social Determinants of Health     Financial Resource Strain: Not on file   Food Insecurity: Not on file   Transportation Needs: Not on file   Physical Activity: Not on file   Stress: Not on file   Social Connections: Not on file   Interpersonal Safety: Not on file   Housing Stability: Not on file           Medications  Allergies   Current Outpatient Medications   Medication Sig Dispense Refill    albuterol (PROAIR HFA/PROVENTIL HFA/VENTOLIN HFA) 108 (90 Base) MCG/ACT inhaler Inhale 2 puffs into the lungs every 4 hours as needed for shortness of breath, wheezing or cough      amLODIPine (NORVASC) 5 MG tablet Take 5 mg by mouth daily      aspirin (ASA) 325 MG EC tablet Take 325 mg by mouth twice a week      BD PEN NEEDLE ALEJANDRO 2ND GEN 32G X 4 MM miscellaneous USE THREE TIMES DAILY WITH NOVOLOG.      Continuous Blood Gluc Sensor (FREESTYLE TEMI 2 SENSOR) MISC Change every 14 days.      furosemide (LASIX) 40 MG tablet Take 40 mg by mouth 2 times daily      Lancets (ONETOUCH DELICA PLUS [id number]) MISC TEST THREE TIMES DAILY      levothyroxine (SYNTHROID/LEVOTHROID) 100 MCG tablet Take 1 tablet (100 mcg) by mouth daily 30 tablet 0    lidocaine (XYLOCAINE) 5 % external ointment Apply topically 4 times daily as needed for moderate pain      LORazepam (ATIVAN) 0.5 MG tablet Take 0.5 mg by mouth daily as needed for anxiety      losartan (COZAAR) 50 MG tablet Take 50 mg by mouth daily      metoprolol tartrate (LOPRESSOR) 50 MG tablet [METOPROLOL TARTRATE (LOPRESSOR) 50 MG TABLET] Take 2 tablets (100 mg total) by mouth 2 (two) times a day. 60 tablet 0    NOVOLOG FLEXPEN 100 UNIT/ML soln Inject 15 Units Subcutaneous 3 times daily (with meals) , plus sliding scale  151-175- 1 unit  176-200- 2 units  201-225- 3 units  226-250- 4 units  251-275- 5 units   276-300- 6 units  301-325- 7 units  326-350- 8 units  351-375- 9  units      omeprazole (PRILOSEC) 20 MG capsule [OMEPRAZOLE (PRILOSEC) 20 MG CAPSULE] Take 1 capsule (20 mg total) by mouth daily before breakfast. 30 capsule 0    ONETOUCH VERIO IQ test strip test three times daily      rosuvastatin (CRESTOR) 5 MG tablet [ROSUVASTATIN (CRESTOR) 5 MG TABLET] Take 1 tablet (5 mg total) by mouth daily. 30 tablet 0    TRESIBA FLEXTOUCH 100 UNIT/ML pen Inject 52 Units Subcutaneous daily      WIXELA INHUB 250-50 MCG/ACT inhaler Inhale 1 puff into the lungs 2 times daily      aspirin (ASA) 81 MG EC tablet Take 1 tablet (81 mg) by mouth daily (Patient not taking: Reported on 4/3/2024)      Cholecalciferol (VITAMIN D3) 50 MCG (2000 UT) CAPS Take 1 capsule by mouth daily (Patient not taking: Reported on 4/3/2024)      mometasone (NASONEX) 50 MCG/ACT nasal spray Spray 2 sprays into both nostrils daily (Patient not taking: Reported on 4/3/2024)         Allergies   Allergen Reactions    Alprazolam Other (See Comments)    Cephalexin Rash and Swelling    Exenatide Other (See Comments)    Hydralazine      Flush and Elevated BP     Iodinated Contrast Media [Iodinated Contrast Media] Unknown    Lisinopril Other (See Comments)    Nitrofurantoin Monohyd/M-Cryst [Nitrofurantoin] Rash    Sulfa (Sulfonamide Antibiotics) [Sulfa Antibiotics] Other (See Comments)          Lab Results    Chemistry/lipid CBC Cardiac Enzymes/BNP/TSH/INR   Recent Labs   Lab Test 12/08/22  0937   CHOL 238*   HDL 63   *   TRIG 191*     Recent Labs   Lab Test 12/08/22  0937 03/16/22  1011 07/14/21  1152   * 58 74     Recent Labs   Lab Test 12/28/22  1207   *   POTASSIUM 4.3   CHLORIDE 97*   CO2 26   *   BUN 36.5*   CR 2.13*   GFRESTIMATED 24*   BILL 9.0     Recent Labs   Lab Test 12/28/22  1207 12/06/22  1135 12/04/22  0624   CR 2.13* 2.00* 1.81*     Recent Labs   Lab Test 11/28/22  0835 08/17/20  1106   A1C 10.2* 10.3*          Recent Labs   Lab Test 12/04/22  0624 12/01/22  0658 11/29/22  0640   WBC  --    --  5.6   HGB  --   --  14.2   HCT  --   --  41.7   MCV  --   --  91      < > 157    < > = values in this interval not displayed.     Recent Labs   Lab Test 11/29/22  0640 11/28/22  0835 06/17/22  0130   HGB 14.2 13.2 14.2    Recent Labs   Lab Test 06/17/22  0130 08/19/20  1407 08/17/20  1106   TROPONINI 0.02 0.03 0.03     Recent Labs   Lab Test 11/28/22  0713   NTBNPI 2,370*     Recent Labs   Lab Test 12/06/22  1135   TSH 3.09     Recent Labs   Lab Test 11/28/22  0920 08/20/20  1026 08/19/20  1407   INR 1.00 1.03 0.93        Lewis Trinidad DO    Today's clinic visit entailed:  36 minutes spent by me on the date of the encounter doing chart review, history and exam, documentation and further activities per the note.                             Thank you for allowing me to participate in the care of your patient.      Sincerely,     Lewis Trinidad DO     [facility]  cc:   Ryan Sotelo MD  [address] [facility] [address]  [place] [address]

## 2024-04-03 NOTE — H&P (VIEW-ONLY)
HEART CARE ENCOUNTER CONSULTATON NOTE      [facility]  [phone]      Assessment/Recommendations   Assessment:   1.  Chronic congestive heart failure secondary to heart failure with preserved ejection fraction with left ventricular hypertrophy related to hypertension.   2. Hypertension  3. HLD.  Statin intolerant  4. DM type II, uncontrolled.   Lab Results   Component Value Date    A1C 10.2 11/28/2022    A1C 10.3 08/17/2020     5. History of premature atrial contractions  6. CKD stage IV (GFR (22).     GFR Estimate   Date Value Ref Range Status   02/25/2024 22 (L) >60 mL/min/1.73m2 Final   04/07/2021 32 (L) >60 mL/min/1.73m2 Final     GFR Estimate If Black   Date Value Ref Range Status   04/07/2021 39 (L) >60 mL/min/1.73m2 Final   7. Asthma   8. 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  Increase Lasix to 80 mg in AM and 40 mg in PM  2.  If worsening lower extremity edema consider discontinuation of amlodipine  3. Amlodipine, losartan and metoprolol for HTN  4.   (history of retinal occlusion).     1.  Preoperative cardiovascular risk assessment: GYN/Onc surgery  Assessment of preoperative cardiac risk: He has no active cardiac conditions (unstable coronary syndromes, decompensated HF, significant arrhythmias, or severe valvular disease), has no known coronary artery disease, has 3 clinical risk factors (ischemic heart disease, prior heart failure, cerebrovascular disease, diabetes mellitus, and renal insufficiency), and has a functional capacity <4 than 4 METs.     Creatinine   Date Value Ref Range Status   02/25/2024 2.32 (H) 0.51 - 0.95 mg/dL Final       Surgical Recommendation(s):   1. Based on clinical risk and cardiac condition it is recommended that the patient stress testing prior to surgical intervention.   2.  Do not hold metoprolol for surgery  3.  Avoid excessive fluid resuscitation during surgery             History of Present Illness/Subjective   "  HPI: Mariama Keene is a 68 year old female with chronic kidney disease stage IV, diabetes mellitus which is uncontrolled, hypertension which has been uncontrolled for some time, asthma who presents to cardiology clinic in consultation for heart failure with preserved ejection fraction.    Since last clinical evaluation the patient has been diagnosed with GYN cancer.  She is to undergo surgical resection in mid April.  We have been asked to evaluate her for preoperative surgical risk factors.  Currently she has some lower extremity edema we will optimize with adjustments of Lasix.  Given her functional status and risk factors we are recommending she undergoes stress testing with Lexiscan or treadmill nuclear stress testing.    Currently she has chronic dyspnea on exertion which is improved since evaluation in the emergency department in February.  Asthma appears to be stable.  She does have chronic moderate pitting lower extremity edema.  She denies any chest pain.  No syncope.    Echocardiogram: 2022  Normal left ventricular size. Mild to moderate left ventricular hypertrophy.  Left ventricular function appears normal. Left ventricular ejection fraction  estimated 55 to 60%.Diastolic function appears abnormal.  Normal right ventricular size and systolic function.  Mild left atrial enlargement  No hemodynamically significant valve abnormality         Physical Examination  Review of Systems   Vitals: BP (!) 164/74 (BP Location: Right arm, Patient Position: Sitting, Cuff Size: Adult Large)   Pulse 67   Resp 14   Ht 1.6 m (5' 3\")   Wt 88.9 kg (196 lb)   BMI 34.72 kg/m    BMI= Body mass index is 34.72 kg/m .  Wt Readings from Last 3 Encounters:   04/03/24 88.9 kg (196 lb)   02/25/24 93.6 kg (206 lb 4.8 oz)   08/31/23 91.6 kg (202 lb)        Anxious but pleasant.   ENT/Mouth: membranes moist, no oral lesions or bleeding gums.      EYES:  no scleral icterus, normal conjunctivae       Chest/Lungs:   lungs are clear " to auscultation, no rales or wheezing, no sternal scar, equal chest wall expansion    Cardiovascular:   Regular. Normal first and second heart sounds with no murmurs, rubs, or gallops; the carotid, radial  pulses are intact, Jugular venous pressure normal, moderate (+3) pitting edema bilaterally    Abdomen:  no tenderness; bowel sounds are present   Extremities: no cyanosis or clubbing   Skin: no xanthelasma, warm.    Neurologic: normal  bilateral, no tremors     Psychiatric: alert and oriented x3, calm        Please refer above for cardiac ROS details.        Medical History  Surgical History Family History Social History   Past Medical History:   Diagnosis Date    Asthma     Asthma     Asthma     Diabetes (H)     Diabetes (H)     Diabetes (H)     Hypertension     Hypertension     Hypertension     Panic attacks     Panic attacks     Panic attacks      Past Surgical History:   Procedure Laterality Date    ABSCESS DRAINAGE      On back    HYSTERECTOMY      age 33 - partial    NEPHRECTOMY PARTIAL Right     OOPHORECTOMY      age 48     Family History   Problem Relation Age of Onset    Breast Cancer Maternal Aunt 45.00    Alzheimer Disease Mother     Hypertension Mother     Chronic Obstructive Pulmonary Disease Father         Social History     Socioeconomic History    Marital status:      Spouse name: Not on file    Number of children: Not on file    Years of education: Not on file    Highest education level: Not on file   Occupational History    Not on file   Tobacco Use    Smoking status: Former     Types: Cigarettes     Quit date: 1992     Years since quittin.2    Smokeless tobacco: Never   Substance and Sexual Activity    Alcohol use: Yes    Drug use: Never    Sexual activity: Not Currently     Birth control/protection: Surgical   Other Topics Concern    Not on file   Social History Narrative    Not on file     Social Determinants of Health     Financial Resource Strain: Not on file   Food  Insecurity: Not on file   Transportation Needs: Not on file   Physical Activity: Not on file   Stress: Not on file   Social Connections: Not on file   Interpersonal Safety: Not on file   Housing Stability: Not on file           Medications  Allergies   Current Outpatient Medications   Medication Sig Dispense Refill    albuterol (PROAIR HFA/PROVENTIL HFA/VENTOLIN HFA) 108 (90 Base) MCG/ACT inhaler Inhale 2 puffs into the lungs every 4 hours as needed for shortness of breath, wheezing or cough      amLODIPine (NORVASC) 5 MG tablet Take 5 mg by mouth daily      aspirin (ASA) 325 MG EC tablet Take 325 mg by mouth twice a week      BD PEN NEEDLE ALEJANDRO 2ND GEN 32G X 4 MM miscellaneous USE THREE TIMES DAILY WITH NOVOLOG.      Continuous Blood Gluc Sensor (FREESTYLE TEMI 2 SENSOR) MISC Change every 14 days.      furosemide (LASIX) 40 MG tablet Take 40 mg by mouth 2 times daily      Lancets (ONETOUCH DELICA PLUS [id number]) MISC TEST THREE TIMES DAILY      levothyroxine (SYNTHROID/LEVOTHROID) 100 MCG tablet Take 1 tablet (100 mcg) by mouth daily 30 tablet 0    lidocaine (XYLOCAINE) 5 % external ointment Apply topically 4 times daily as needed for moderate pain      LORazepam (ATIVAN) 0.5 MG tablet Take 0.5 mg by mouth daily as needed for anxiety      losartan (COZAAR) 50 MG tablet Take 50 mg by mouth daily      metoprolol tartrate (LOPRESSOR) 50 MG tablet [METOPROLOL TARTRATE (LOPRESSOR) 50 MG TABLET] Take 2 tablets (100 mg total) by mouth 2 (two) times a day. 60 tablet 0    NOVOLOG FLEXPEN 100 UNIT/ML soln Inject 15 Units Subcutaneous 3 times daily (with meals) , plus sliding scale  151-175- 1 unit  176-200- 2 units  201-225- 3 units  226-250- 4 units  251-275- 5 units   276-300- 6 units  301-325- 7 units  326-350- 8 units  351-375- 9 units      omeprazole (PRILOSEC) 20 MG capsule [OMEPRAZOLE (PRILOSEC) 20 MG CAPSULE] Take 1 capsule (20 mg total) by mouth daily before breakfast. 30 capsule 0    ONETOUCH VERIO IQ test strip  test three times daily      rosuvastatin (CRESTOR) 5 MG tablet [ROSUVASTATIN (CRESTOR) 5 MG TABLET] Take 1 tablet (5 mg total) by mouth daily. 30 tablet 0    TRESIBA FLEXTOUCH 100 UNIT/ML pen Inject 52 Units Subcutaneous daily      WIXELA INHUB 250-50 MCG/ACT inhaler Inhale 1 puff into the lungs 2 times daily      aspirin (ASA) 81 MG EC tablet Take 1 tablet (81 mg) by mouth daily (Patient not taking: Reported on 4/3/2024)      Cholecalciferol (VITAMIN D3) 50 MCG (2000 UT) CAPS Take 1 capsule by mouth daily (Patient not taking: Reported on 4/3/2024)      mometasone (NASONEX) 50 MCG/ACT nasal spray Spray 2 sprays into both nostrils daily (Patient not taking: Reported on 4/3/2024)         Allergies   Allergen Reactions    Alprazolam Other (See Comments)    Cephalexin Rash and Swelling    Exenatide Other (See Comments)    Hydralazine      Flush and Elevated BP     Iodinated Contrast Media [Iodinated Contrast Media] Unknown    Lisinopril Other (See Comments)    Nitrofurantoin Monohyd/M-Cryst [Nitrofurantoin] Rash    Sulfa (Sulfonamide Antibiotics) [Sulfa Antibiotics] Other (See Comments)          Lab Results    Chemistry/lipid CBC Cardiac Enzymes/BNP/TSH/INR   Recent Labs   Lab Test 12/08/22  0937   CHOL 238*   HDL 63   *   TRIG 191*     Recent Labs   Lab Test 12/08/22  0937 03/16/22  1011 07/14/21  1152   * 58 74     Recent Labs   Lab Test 12/28/22  1207   *   POTASSIUM 4.3   CHLORIDE 97*   CO2 26   *   BUN 36.5*   CR 2.13*   GFRESTIMATED 24*   BILL 9.0     Recent Labs   Lab Test 12/28/22  1207 12/06/22  1135 12/04/22  0624   CR 2.13* 2.00* 1.81*     Recent Labs   Lab Test 11/28/22  0835 08/17/20  1106   A1C 10.2* 10.3*          Recent Labs   Lab Test 12/04/22  0624 12/01/22  0658 11/29/22  0640   WBC  --   --  5.6   HGB  --   --  14.2   HCT  --   --  41.7   MCV  --   --  91      < > 157    < > = values in this interval not displayed.     Recent Labs   Lab Test 11/29/22  5126  11/28/22  0835 06/17/22  0130   HGB 14.2 13.2 14.2    Recent Labs   Lab Test 06/17/22  0130 08/19/20  1407 08/17/20  1106   TROPONINI 0.02 0.03 0.03     Recent Labs   Lab Test 11/28/22  0713   NTBNPI 2,370*     Recent Labs   Lab Test 12/06/22  1135   TSH 3.09     Recent Labs   Lab Test 11/28/22  0920 08/20/20  1026 08/19/20  1407   INR 1.00 1.03 0.93        Lewis Trinidad DO    Today's clinic visit entailed:  36 minutes spent by me on the date of the encounter doing chart review, history and exam, documentation and further activities per the note.

## 2024-04-04 ENCOUNTER — TRANSFERRED RECORDS (OUTPATIENT)
Dept: HEALTH INFORMATION MANAGEMENT | Facility: CLINIC | Age: 69
End: 2024-04-04
Payer: COMMERCIAL

## 2024-04-04 ENCOUNTER — LAB REQUISITION (OUTPATIENT)
Dept: LAB | Facility: CLINIC | Age: 69
End: 2024-04-04

## 2024-04-04 DIAGNOSIS — Z01.818 ENCOUNTER FOR OTHER PREPROCEDURAL EXAMINATION: ICD-10-CM

## 2024-04-04 LAB
ERYTHROCYTE [DISTWIDTH] IN BLOOD BY AUTOMATED COUNT: 12.7 % (ref 10–15)
HCT VFR BLD AUTO: 36 % (ref 35–47)
HGB BLD-MCNC: 12.3 G/DL (ref 11.7–15.7)
MCH RBC QN AUTO: 30.7 PG (ref 26.5–33)
MCHC RBC AUTO-ENTMCNC: 34.2 G/DL (ref 31.5–36.5)
MCV RBC AUTO: 90 FL (ref 78–100)
PLATELET # BLD AUTO: 184 10E3/UL (ref 150–450)
RBC # BLD AUTO: 4.01 10E6/UL (ref 3.8–5.2)
WBC # BLD AUTO: 5.7 10E3/UL (ref 4–11)

## 2024-04-04 PROCEDURE — 80048 BASIC METABOLIC PNL TOTAL CA: CPT | Performed by: PHYSICIAN ASSISTANT

## 2024-04-04 PROCEDURE — 85027 COMPLETE CBC AUTOMATED: CPT | Performed by: PHYSICIAN ASSISTANT

## 2024-04-05 LAB
ANION GAP SERPL CALCULATED.3IONS-SCNC: 15 MMOL/L (ref 7–15)
BUN SERPL-MCNC: 50.8 MG/DL (ref 8–23)
CALCIUM SERPL-MCNC: 9.3 MG/DL (ref 8.8–10.2)
CHLORIDE SERPL-SCNC: 101 MMOL/L (ref 98–107)
CREAT SERPL-MCNC: 3.04 MG/DL (ref 0.51–0.95)
DEPRECATED HCO3 PLAS-SCNC: 23 MMOL/L (ref 22–29)
EGFRCR SERPLBLD CKD-EPI 2021: 16 ML/MIN/1.73M2
GLUCOSE SERPL-MCNC: 129 MG/DL (ref 70–99)
POTASSIUM SERPL-SCNC: 4.3 MMOL/L (ref 3.4–5.3)
SODIUM SERPL-SCNC: 139 MMOL/L (ref 135–145)

## 2024-04-08 ENCOUNTER — HOSPITAL ENCOUNTER (OUTPATIENT)
Dept: NUCLEAR MEDICINE | Facility: HOSPITAL | Age: 69
Discharge: HOME | End: 2024-04-08
Attending: INTERNAL MEDICINE
Payer: COMMERCIAL

## 2024-04-08 ENCOUNTER — ANESTHESIA EVENT (OUTPATIENT)
Dept: SURGERY | Facility: HOSPITAL | Age: 69
DRG: 734 | End: 2024-04-08
Payer: COMMERCIAL

## 2024-04-08 ENCOUNTER — TELEPHONE (OUTPATIENT)
Dept: CARDIOLOGY | Facility: CLINIC | Age: 69
End: 2024-04-08

## 2024-04-08 ENCOUNTER — HOSPITAL ENCOUNTER (OUTPATIENT)
Dept: CARDIOLOGY | Facility: HOSPITAL | Age: 69
Discharge: HOME | End: 2024-04-08
Attending: INTERNAL MEDICINE
Payer: COMMERCIAL

## 2024-04-08 DIAGNOSIS — R06.09 EXERTIONAL DYSPNEA: ICD-10-CM

## 2024-04-08 DIAGNOSIS — N18.4 TYPE 2 DIABETES MELLITUS WITH STAGE 4 CHRONIC KIDNEY DISEASE, WITHOUT LONG-TERM CURRENT USE OF INSULIN (H): ICD-10-CM

## 2024-04-08 DIAGNOSIS — Z01.810 PRE-OPERATIVE CARDIOVASCULAR EXAMINATION: ICD-10-CM

## 2024-04-08 DIAGNOSIS — I50.32 CHRONIC DIASTOLIC CONGESTIVE HEART FAILURE (H): Primary | ICD-10-CM

## 2024-04-08 DIAGNOSIS — E11.22 TYPE 2 DIABETES MELLITUS WITH STAGE 4 CHRONIC KIDNEY DISEASE, WITHOUT LONG-TERM CURRENT USE OF INSULIN (H): ICD-10-CM

## 2024-04-08 LAB
CV BLOOD PRESSURE: 68 MMHG
CV STRESS CURRENT BP HE: NORMAL
CV STRESS CURRENT HR HE: 101
CV STRESS CURRENT HR HE: 106
CV STRESS CURRENT HR HE: 109
CV STRESS CURRENT HR HE: 111
CV STRESS CURRENT HR HE: 117
CV STRESS CURRENT HR HE: 118
CV STRESS CURRENT HR HE: 122
CV STRESS CURRENT HR HE: 128
CV STRESS CURRENT HR HE: 128
CV STRESS CURRENT HR HE: 129
CV STRESS CURRENT HR HE: 130
CV STRESS CURRENT HR HE: 132
CV STRESS CURRENT HR HE: 91
CV STRESS CURRENT HR HE: 92
CV STRESS CURRENT HR HE: 92
CV STRESS CURRENT HR HE: 93
CV STRESS CURRENT HR HE: 93
CV STRESS CURRENT HR HE: 97
CV STRESS CURRENT HR HE: NORMAL
CV STRESS DEVIATION TIME HE: NORMAL
CV STRESS ECHO PERCENT HR HE: NORMAL
CV STRESS EXERCISE STAGE HE: NORMAL
CV STRESS EXERCISE STAGE REACHED HE: NORMAL
CV STRESS FINAL RESTING BP HE: NORMAL
CV STRESS FINAL RESTING HR HE: 91
CV STRESS MAX HR HE: 131
CV STRESS MAX TREADMILL GRADE HE: 12
CV STRESS MAX TREADMILL SPEED HE: 2.5
CV STRESS PEAK DIA BP HE: NORMAL
CV STRESS PEAK SYS BP HE: NORMAL
CV STRESS PHASE HE: NORMAL
CV STRESS PROTOCOL HE: NORMAL
CV STRESS RESTING PT POSITION HE: NORMAL
CV STRESS ST DEVIATION AMOUNT HE: NORMAL
CV STRESS ST DEVIATION ELEVATION HE: NORMAL
CV STRESS ST EVELATION AMOUNT HE: NORMAL
CV STRESS TEST TYPE HE: NORMAL
CV STRESS TOTAL STAGE TIME MIN 1 HE: NORMAL
RATE PRESSURE PRODUCT: NORMAL
STRESS ECHO BASELINE DIASTOLIC HE: 70
STRESS ECHO BASELINE HR: 91
STRESS ECHO BASELINE SYSTOLIC BP: 162
STRESS ECHO CALCULATED PERCENT HR: 87 %
STRESS ECHO LAST STRESS DIASTOLIC BP: 70
STRESS ECHO LAST STRESS HR: 129
STRESS ECHO LAST STRESS SYSTOLIC BP: 184
STRESS ECHO POST ESTIMATED WORKLOAD: 5.8
STRESS ECHO POST EXERCISE DUR MIN: 4
STRESS ECHO POST EXERCISE DUR SEC: 6
STRESS ECHO TARGET HR: 150
STRESS/REST PERFUSION RATIO: 1.4

## 2024-04-08 PROCEDURE — 343N000001 HC RX 343: Performed by: INTERNAL MEDICINE

## 2024-04-08 PROCEDURE — A9500 TC99M SESTAMIBI: HCPCS | Performed by: INTERNAL MEDICINE

## 2024-04-08 PROCEDURE — 78452 HT MUSCLE IMAGE SPECT MULT: CPT | Mod: 26 | Performed by: INTERNAL MEDICINE

## 2024-04-08 PROCEDURE — 93016 CV STRESS TEST SUPVJ ONLY: CPT | Performed by: INTERNAL MEDICINE

## 2024-04-08 PROCEDURE — 78452 HT MUSCLE IMAGE SPECT MULT: CPT

## 2024-04-08 PROCEDURE — 93018 CV STRESS TEST I&R ONLY: CPT | Performed by: INTERNAL MEDICINE

## 2024-04-08 PROCEDURE — 93017 CV STRESS TEST TRACING ONLY: CPT

## 2024-04-08 RX ADMIN — Medication 8 MILLICURIE: at 07:05

## 2024-04-08 RX ADMIN — Medication 32.4 MILLICURIE: at 08:05

## 2024-04-08 NOTE — TELEPHONE ENCOUNTER
Discussion with MAT. Patient to come in for BMP check at [facility] as soon as possible.  If improving Cr could possibly have a heart cath this week, Friday. Otherwise if Cr worse then will need to discuss with Nephrology. CMM,Rn

## 2024-04-08 NOTE — TELEPHONE ENCOUNTER
Spoke with patient on the phone, review of concerns arising from stress test. Need updated blood work called a BMP, to check kidney function so can proceed in safest manner. Once lab results known then cardiology can determine any next steps, including reaching out Nephrology.   BMP order placed, and requests to have a lab appt at [facility], arranged for 1015 tomorrow. CMM,Rn

## 2024-04-09 ENCOUNTER — LAB (OUTPATIENT)
Dept: CARDIOLOGY | Facility: CLINIC | Age: 69
End: 2024-04-09
Payer: COMMERCIAL

## 2024-04-09 DIAGNOSIS — I50.32 CHRONIC DIASTOLIC CONGESTIVE HEART FAILURE (H): ICD-10-CM

## 2024-04-09 LAB
ANION GAP SERPL CALCULATED.3IONS-SCNC: 17 MMOL/L (ref 7–15)
BUN SERPL-MCNC: 50.2 MG/DL (ref 8–23)
CALCIUM SERPL-MCNC: 9.2 MG/DL (ref 8.8–10.2)
CHLORIDE SERPL-SCNC: 101 MMOL/L (ref 98–107)
CREAT SERPL-MCNC: 2.62 MG/DL (ref 0.51–0.95)
DEPRECATED HCO3 PLAS-SCNC: 22 MMOL/L (ref 22–29)
EGFRCR SERPLBLD CKD-EPI 2021: 19 ML/MIN/1.73M2
GLUCOSE SERPL-MCNC: 112 MG/DL (ref 70–99)
POTASSIUM SERPL-SCNC: 4.4 MMOL/L (ref 3.4–5.3)
SODIUM SERPL-SCNC: 140 MMOL/L (ref 135–145)

## 2024-04-09 PROCEDURE — 36415 COLL VENOUS BLD VENIPUNCTURE: CPT

## 2024-04-09 PROCEDURE — 80048 BASIC METABOLIC PNL TOTAL CA: CPT

## 2024-04-09 NOTE — OR NURSING
"Pre-op call RN Assessment    Addendum 4/11: RN checked in on patient to see if she wanted to discuss surgery details with Dr. Vishal Altamirano on 4/16. Patient very frustrated and states \"I have been waiting and waiting for cardiology to call me, the lab results are in but they haven't interpreted it yet.\" RN provided empathetic listening. RN instructed patient to please call me back at [phone] when she would like to reinforce pre-op assessment instructions.    Procedure start: 1225  Arrive: 1055  NPO: 0425  Clears: 0825  -------------    Patient states that she is unsure about having surgery due to her physician not signing off her pre-op evaluation due to cardiac issues. She states that the cardiologist needs to sign off prior to the surgery date. Patient asked if Dr. Vishal Altamirano knew of this or if her Cardiologist Dr. Trinidad was in communication with the surgeon. RN stated we do not have that information, patient states she just had labs drawn today and expects a call from either providers. Patient took down information regarding pre-op but did not seem interested until she knows for sure. Pre-op office number given to patient to call back if surgery is planned. Dr. Vishal Altamirano's office number also given to patient.   "

## 2024-04-11 ENCOUNTER — TELEPHONE (OUTPATIENT)
Dept: CARDIOLOGY | Facility: CLINIC | Age: 69
End: 2024-04-11
Payer: COMMERCIAL

## 2024-04-11 DIAGNOSIS — Z01.810 PRE-OPERATIVE CARDIOVASCULAR EXAMINATION: ICD-10-CM

## 2024-04-11 DIAGNOSIS — R06.09 EXERTIONAL DYSPNEA: ICD-10-CM

## 2024-04-11 DIAGNOSIS — E11.22 TYPE 2 DIABETES MELLITUS WITH STAGE 4 CHRONIC KIDNEY DISEASE, WITHOUT LONG-TERM CURRENT USE OF INSULIN (H): ICD-10-CM

## 2024-04-11 DIAGNOSIS — R94.39 ABNORMAL CARDIOVASCULAR STRESS TEST: ICD-10-CM

## 2024-04-11 DIAGNOSIS — Z91.041 RADIOGRAPHIC DYE ALLERGY STATUS: Primary | ICD-10-CM

## 2024-04-11 DIAGNOSIS — N18.4 TYPE 2 DIABETES MELLITUS WITH STAGE 4 CHRONIC KIDNEY DISEASE, WITHOUT LONG-TERM CURRENT USE OF INSULIN (H): ICD-10-CM

## 2024-04-11 NOTE — TELEPHONE ENCOUNTER
"PC to patient, discussion and review of results. Verbalized understanding. Discussion of \"high risk\" findings on stress test. Awaiting feedback from Nephrologist to determine next steps. Minimal discussion of possibility of angiogram asa a next step. Pt is paralyzed with fear of procedure. Therapeutic communication and reassurance. Pt has known panic disorder in relation to medical procedures and/or surgeries. Informed patient will call and update as soon as any further recommendations are known. Pt questioning if there is any alternative versus angiogram. Informed patient that will update MAT, to see if there is alternatives versus angiogram. LUIS,RN   "

## 2024-04-11 NOTE — TELEPHONE ENCOUNTER
"Pt with panic disorder and fear. Unsure if she will proceed with an angiogram, if recommended. Given that patient needs a surgery. Pt may need a VV or appt with you to have an in-depth conversation of risks involved, and \"big picture\" if she doesn't proceed with angiogram/heart care recommendations. LUIS,RN   "

## 2024-04-11 NOTE — TELEPHONE ENCOUNTER
----- Message from Lewis Trinidad DO sent at 4/10/2024 11:07 AM CDT -----  Reviewed labs which demonstrate improving creatinine with increased Lasix dosing.  I have sent message off to patient's primary nephrologist for further insight regarding proceeding with angiogram.

## 2024-04-12 ENCOUNTER — TELEPHONE (OUTPATIENT)
Dept: CARDIOLOGY | Facility: CLINIC | Age: 69
End: 2024-04-12
Payer: COMMERCIAL

## 2024-04-12 NOTE — TELEPHONE ENCOUNTER
PC to [facility]. Spoke with Jaimie nurse in the clinic. Informed of abnormal stress test. Cardiology attempting to connect with Nephrology for best next steps. Awaiting final input from cardiologist. Jaimie stated that since cardiology clearance not given pt will be rescheduled. LUIS,Rn

## 2024-04-12 NOTE — TELEPHONE ENCOUNTER
----- Message from Nilda Carlos sent at 4/11/2024  3:41 PM CDT -----  Regarding: MARGARET PT  General phone call:    Caller: [facility]   Primary cardiologist: MARGARET  Detailed reason for call: ANY UPDATE ON CLEARANCE FOR SURGERY   Best phone number: [phone]  Best time to contact: ANY  Ok to leave a detailedmessage? YES  Device?     Additional Info:

## 2024-04-15 NOTE — TELEPHONE ENCOUNTER
===View-only below this line===  ----- Message -----  From: Lewis Trinidad DO  Sent: 4/15/2024   2:11 PM CDT  To: Benjamin Lynch RN    Given steroids as recommended (no alternative).  Hold GLP-1 Degludec/Tresiba 52 units in evening.

## 2024-04-15 NOTE — TELEPHONE ENCOUNTER
"  ===View-only below this line===  ----- Message -----  From: Lewis Trinidad DO  Sent: 4/12/2024   1:21 PM CDT  To: Benjamin Lynch RN; Casa Vasquez MD    Spoke with Dr. Ott.  She agrees that patient can proceed with angiogram given concern for ischemia.  Pre hydration with IV fluids 500 ml over 2.5 hours.  Hold lasix day prior.  Schedule next week with Dr. Vasquez.     Thanks,     Abundio    Review with patient via phone. States that she \"has to do it\" and prays to God that she can get through it. Panic disorder and fear, very real to pt. Agreeable to schedule. Alerts: Diabetic, Renal and possible dye allergy. Reports IVP dye many years ago, possibly 20 or more years ago. Doesn't recall reaction. Will seek advisement from MAT.   Arranged with MY on Thursday, 4/18/24, 0700 arrival. Will update MAT, and seek recommendations. Plan to send prep letter and education via personal e-mail. Will connect with patient tomorrow at 1530 to review for Thursday. No further questions at end f call. Butch SMITH     Case Type: CORS poss PCI  Ordering Provider: MARGARET  H&P: 4/3/24  Alerts: DIABETIC, RENAL and DYE allergy   Additional Info/Urgency: MY only, this week   Orders for Pre-Procedure Labs? Yes  Teach: BUTCH SMITH    "

## 2024-04-15 NOTE — TELEPHONE ENCOUNTER
Dr. Trinidad please review. Panic disorder very real for patient, and may make procedure challenging. I will alert the [facility] in [facility] for the day to inform and make request for oral sedation pre-procedure. Please advise, previous IVP dye approx 20 or more years ago. No recent testing with CT dye to know if allergic. Although, CT imaging non-contrast.       #1 Any pre-treatment for CT dye protocol? If yes, order prednisone 50 mg protocol?         #2- if steroids are given for pre-treatment. On a GLP-1 Degludec/Tresiba 52 units every evening. HOLD full dose evening prior to procedure as per protocol? Or alternate advisement?   LUIS,RN

## 2024-04-16 ENCOUNTER — ANESTHESIA (OUTPATIENT)
Dept: SURGERY | Facility: HOSPITAL | Age: 69
DRG: 734 | End: 2024-04-16
Payer: COMMERCIAL

## 2024-04-16 RX ORDER — PREDNISONE 50 MG/1
TABLET ORAL
Qty: 3 TABLET | Refills: 0 | Status: ON HOLD | OUTPATIENT
Start: 2024-04-16 | End: 2024-05-07

## 2024-04-16 RX ORDER — DIPHENHYDRAMINE HCL 50 MG
CAPSULE ORAL
Qty: 1 CAPSULE | Refills: 0 | Status: ON HOLD | OUTPATIENT
Start: 2024-04-16 | End: 2024-05-07

## 2024-04-16 NOTE — TELEPHONE ENCOUNTER
PC to patient. Requests that prep letter and information be sent via personal e-mail. Verified email address. Letter  and sent with educational packet for CORS poss PCI.   Rx for prednisone to pre-treat with Benadryl sent to pharmacy. Plan to call patient today [phone] to review prep and education. LUIS,RN

## 2024-04-16 NOTE — TELEPHONE ENCOUNTER
PC with patient. Reviewed prep letter and educational materials via e-mail. Reviewed instructions. Fear and panic real for patient. Plan to have review and overnight to absorb. Will call tomorrow at 11am to review prep and education. LUISRn

## 2024-04-17 ENCOUNTER — PREP FOR PROCEDURE (OUTPATIENT)
Dept: CARDIOLOGY | Facility: CLINIC | Age: 69
End: 2024-04-17
Payer: COMMERCIAL

## 2024-04-17 DIAGNOSIS — R06.09 EXERTIONAL DYSPNEA: ICD-10-CM

## 2024-04-17 DIAGNOSIS — R94.39 ABNORMAL CARDIOVASCULAR STRESS TEST: Primary | ICD-10-CM

## 2024-04-17 DIAGNOSIS — Z01.810 PRE-OPERATIVE CARDIOVASCULAR EXAMINATION: ICD-10-CM

## 2024-04-17 DIAGNOSIS — I50.32 CHRONIC DIASTOLIC CONGESTIVE HEART FAILURE (H): ICD-10-CM

## 2024-04-17 RX ORDER — NICOTINE POLACRILEX 4 MG
15-30 LOZENGE BUCCAL
Status: CANCELLED | OUTPATIENT
Start: 2024-04-17

## 2024-04-17 RX ORDER — DEXTROSE MONOHYDRATE 25 G/50ML
25-50 INJECTION, SOLUTION INTRAVENOUS
Status: CANCELLED | OUTPATIENT
Start: 2024-04-17

## 2024-04-17 RX ORDER — SODIUM CHLORIDE 9 MG/ML
INJECTION, SOLUTION INTRAVENOUS CONTINUOUS
Status: CANCELLED | OUTPATIENT
Start: 2024-04-17

## 2024-04-17 RX ORDER — ASPIRIN 81 MG/1
243 TABLET, CHEWABLE ORAL ONCE
Status: CANCELLED | OUTPATIENT
Start: 2024-04-17

## 2024-04-17 RX ORDER — LIDOCAINE 40 MG/G
CREAM TOPICAL
Status: CANCELLED | OUTPATIENT
Start: 2024-04-17

## 2024-04-17 RX ORDER — FENTANYL CITRATE 50 UG/ML
25 INJECTION, SOLUTION INTRAMUSCULAR; INTRAVENOUS
Status: CANCELLED | OUTPATIENT
Start: 2024-04-17

## 2024-04-17 RX ORDER — ASPIRIN 325 MG
325 TABLET ORAL ONCE
Status: CANCELLED | OUTPATIENT
Start: 2024-04-17 | End: 2024-04-17

## 2024-04-17 NOTE — TELEPHONE ENCOUNTER
PC with patient. Reviewed prep letter and education. Pt did not review the educational material as she thought it would increase her fear. Opportunity to ask questions. None further at end of call. Pt will be accompanied by her sons and possibly sister. Has innate panic and fear over procedure. Encouraged to utilize her PRM Lorazepam today per instructions. Requested for safety reasons to inform nursing staff during intake/prep tomorrow of last time taken. Also of note pt reports urinary frequency, that worsens when she is anxious. Encouraged to discuss with staff, and possible utilization of a purewick or alternative to address. Documentation. Orders placed. Pt ready for procedure. LUIS,RN    Biopsy Method: curette Destruction After The Procedure: No Detail Level: Detailed Depth Of Biopsy: dermis Notification Instructions: Patient will be notified of biopsy results. However, patient instructed to call the office if not contacted within 2 weeks. Dressing: bandage Electrodesiccation Text: The wound bed was treated with electrodesiccation after the biopsy was performed. Electrodesiccation And Curettage Text: The wound bed was treated with electrodesiccation and curettage after the biopsy was performed. Type Of Destruction Used: Curettage Cryotherapy Text: The wound bed was treated with cryotherapy after the biopsy was performed. Wound Care: Vaniply Consent: Written consent was obtained and risks were reviewed including but not limited to scarring, infection, bleeding, scabbing, incomplete removal, nerve damage and allergy to anesthesia. Post-Care Instructions: I reviewed with the patient in detail post-care instructions. Patient is to keep the biopsy site dry overnight, and then apply bacitracin twice daily until healed. Patient may apply hydrogen peroxide soaks to remove any crusting. X Size Of Lesion In Cm: 0 Billing Type: Third-Party Bill Silver Nitrate Text: The wound bed was treated with silver nitrate after the biopsy was performed. Was A Bandage Applied: Yes Biopsy Type: H and E Hemostasis: Drysol Anesthesia Volume In Cc: 1 Anesthesia Type: 1% lidocaine with epinephrine

## 2024-04-17 NOTE — TELEPHONE ENCOUNTER
Mariama Keene  [address]     [facility] [address]  [phone] (home)     Reason: Abnormal stress test, pre-operative evaluation   Procedure cardiologist:  Christina   PCP:  Indiana Nixon  H&P completed by:  Dr Harry 4/3/24  Admit date 4/18/24  Arrival time:  0700  Anticoagulation: None  CPAP: No  Previous PCI: None  Bypass Grafts: No  Renal Issues: Yes. Renal protocol.  Diabetic?: Yes. Insulin. Ursula Free glucometer   Device?: No    Ambulatory?: Independent  +DYE Allergy  ++Difficult IV insertion   Angiogram Teaching    Reason for Visit:  Telephone call to discuss pre-procedure education in preparation for: Coronary Angiogram with Possible Percutaneous Coronary Intervention     Procedure Prep:  Primary Cardiologist note dated: 4/3/24    EKG results obtained, dated: Morning of procedure  Pre-procedure labs: Hemogram, BMP and T&S results obtained: Morning of procedure  Lipid Profile results obtained: Morning of procedure    Pre-procedure instructions  In preparation for this procedure, we would ask that you have:  No solid food for 8 hours prior to your procedure: Midnight   No clear liquids 2 hours prior to your procedure: 0500     Patient instructed to shower the evening before or the morning of the procedure.  Leave all valuables at home (jewlery, rings, watches, large amounts of money).  Patient understands (2) visitors allowed during patients stay.   Patient instructed to arrange for transportation home following procedure from a responsible family member of friend. No driving for at least 24 hours post-procedure.  Patient instructed to have a responsible adult with them for 24 hours post-procedure.  Post-procedure follow up process.  Conscious sedation discussed.    Pre-procedure medication instructions  Patient instructed on antiplatelet medication.  Continue medications as scheduled, with a small amount of water on the day of the procedure unless indicated.  Patient instructed to take  325 mg of Aspirin am of procedure: Yes  Other medication:   Do NOT TAKE your water pill Furosemide/Lasix on Wednesday, 4/17 at all. Do NOT take morning of Thursday, 4/18/24. You will be instructed when to resume.   Wednesday evening 4/17 do NOT take your Tresiba/Degludec insulin.   Thursday morning, 4/18 do NOT take your Losartan/Cozaar medication. Do NOT take any short-acting insulin Novolog.     To pre-treat for DYE ALLERGY, we will be adding the following:   First dose of Prednisone 50 mg by mouth 13 hours prior, take 4/17 around 8-8:30 pm      Second dose of Prednisone 50 mg by mouth 7 hours prior, take 4/18 around 1:30 am-2:00 am  Third/Final dose of Prednisone 50 mg by mouth with Benadryl 50 mg by mouth, around   1 hour prior to procedure. (Bring these 2 medications with you and take with guidance   of nursing staff at the hospital). Prescription has been sent to your pharmacy.      Morning of procedure please HOLD all vitamins, minerals, and supplements. Please TAKE (4) baby aspirin or (1) full size 325 mg aspirin morning of procedure.    Take all other prescription medications morning of procedure unless mentioned above. Ok to take inhalers also.       *PATIENTS RECORDS AVAILABLE IN [employer] [employer] UNLESS OTHERWISE INDICATED*    Patient Active Problem List   Diagnosis    Insulin dependent type 2 diabetes mellitus (H)    Mixed hyperlipidemia    Obesity    Essential hypertension, benign    Esophageal Reflux    Anxiety    Chronic fatigue syndrome    Hypothyroidism    Long term current use of insulin (H)    Macromastia    Polyneuropathy due to type 2 diabetes mellitus (H)    Recurrent major depression in full remission (H24)    Tobacco user    Vitamin D deficiency    Gastroesophageal reflux disease, esophagitis presence not specified    Obesity (BMI 30.0-34.9)    COVID-19    Acute kidney injury (H24)    Viral pneumonia    RSV infection    Hypertension, unspecified type    Chronic kidney disease, stage 3b (H)       Current  Outpatient Medications   Medication Sig Dispense Refill    diphenhydrAMINE (BENADRYL) 50 MG capsule Prednisone 50 mg by mouth, 13 hours prior to procedure, Second dose of Prednisone 50 mg by mouth 7 hours prior to procedure, and Final dose of Prednisone 50 mg with Benadryl 50 mg by mouth one hour prior to procedure time. 1 capsule 0    predniSONE (DELTASONE) 50 MG tablet Prednisone 50 mg by mouth, 13 hours prior to procedure, Second dose of Prednisone 50 mg by mouth 7 hours prior to procedure, and Final dose of Prednisone 50 mg with Benadryl 50 mg by mouth one hour prior to procedure time. 3 tablet 0    albuterol (PROAIR HFA/PROVENTIL HFA/VENTOLIN HFA) 108 (90 Base) MCG/ACT inhaler Inhale 2 puffs into the lungs every 4 hours as needed for shortness of breath, wheezing or cough      amLODIPine (NORVASC) 5 MG tablet Take 5 mg by mouth daily      aspirin (ASA) 325 MG EC tablet Take 325 mg by mouth twice a week      aspirin (ASA) 81 MG EC tablet Take 1 tablet (81 mg) by mouth daily (Patient not taking: Reported on 4/3/2024)      BD PEN NEEDLE ALEJANDRO 2ND GEN 32G X 4 MM miscellaneous USE THREE TIMES DAILY WITH NOVOLOG.      Cholecalciferol (VITAMIN D3) 50 MCG (2000 UT) CAPS Take 1 capsule by mouth daily (Patient not taking: Reported on 4/3/2024)      Continuous Blood Gluc Sensor (FREESTYLE TEMI 2 SENSOR) MISC Change every 14 days.      furosemide (LASIX) 40 MG tablet Take 40 mg by mouth 2 times daily      Lancets (ONETOUCH DELICA PLUS [id number]) MISC TEST THREE TIMES DAILY      levothyroxine (SYNTHROID/LEVOTHROID) 100 MCG tablet Take 1 tablet (100 mcg) by mouth daily 30 tablet 0    lidocaine (XYLOCAINE) 5 % external ointment Apply topically 4 times daily as needed for moderate pain      LORazepam (ATIVAN) 0.5 MG tablet Take 0.5 mg by mouth daily as needed for anxiety      losartan (COZAAR) 50 MG tablet Take 50 mg by mouth daily      metoprolol tartrate (LOPRESSOR) 50 MG tablet [METOPROLOL TARTRATE (LOPRESSOR) 50 MG TABLET]  Take 2 tablets (100 mg total) by mouth 2 (two) times a day. 60 tablet 0    mometasone (NASONEX) 50 MCG/ACT nasal spray Spray 2 sprays into both nostrils daily (Patient not taking: Reported on 4/3/2024)      NOVOLOG FLEXPEN 100 UNIT/ML soln Inject 15 Units Subcutaneous 3 times daily (with meals) , plus sliding scale  151-175- 1 unit  176-200- 2 units  201-225- 3 units  226-250- 4 units  251-275- 5 units   276-300- 6 units  301-325- 7 units  326-350- 8 units  351-375- 9 units      omeprazole (PRILOSEC) 20 MG capsule [OMEPRAZOLE (PRILOSEC) 20 MG CAPSULE] Take 1 capsule (20 mg total) by mouth daily before breakfast. 30 capsule 0    ONETOUCH VERIO IQ test strip test three times daily      rosuvastatin (CRESTOR) 5 MG tablet [ROSUVASTATIN (CRESTOR) 5 MG TABLET] Take 1 tablet (5 mg total) by mouth daily. 30 tablet 0    TRESIBA FLEXTOUCH 100 UNIT/ML pen Inject 52 Units Subcutaneous daily      WIXELA INHUB 250-50 MCG/ACT inhaler Inhale 1 puff into the lungs 2 times daily         Allergies   Allergen Reactions    Alprazolam Other (See Comments)    Cephalexin Rash and Swelling    Exenatide Other (See Comments)    Hydralazine      Flush and Elevated BP     Iodinated Contrast Media [Iodinated Contrast Media] Unknown    Lisinopril Other (See Comments)    Nitrofurantoin Monohyd/M-Cryst [Nitrofurantoin] Rash    Sulfa (Sulfonamide Antibiotics) [Sulfa Antibiotics] Other (See Comments)       Procedure order set placed: 4/18/24    Plan: Patient education completed. Patient will be accompanied by her son (s) and sister. Opportunity to ask questions and have them answered. None further at this time. Patient verbalized understanding of responsible adult for 24 hours and  post-procedure at time of discharge. Patient ready for procedure.     Benjamin Lynch RN

## 2024-04-17 NOTE — PROGRESS NOTES
==View-only below this line===  ----- Message -----  From: Lewis Trinidad DO  Sent: 4/12/2024   1:21 PM CDT  To: Benjamin Lynch RN; Casa Vasquez MD     Spoke with Dr. Ott.  She agrees that patient can proceed with angiogram given concern for ischemia.  Pre hydration with IV fluids 500 ml over 2.5 hours.  Hold lasix day prior.  Schedule next week with Dr. Vasquez.      Thanks,      Abundio

## 2024-04-18 ENCOUNTER — HOSPITAL ENCOUNTER (OUTPATIENT)
Facility: HOSPITAL | Age: 69
Discharge: HOME | End: 2024-04-18
Attending: INTERNAL MEDICINE | Admitting: INTERNAL MEDICINE
Payer: COMMERCIAL

## 2024-04-18 VITALS
RESPIRATION RATE: 15 BRPM | HEIGHT: 63 IN | DIASTOLIC BLOOD PRESSURE: 63 MMHG | OXYGEN SATURATION: 94 % | TEMPERATURE: 97.7 F | WEIGHT: 196.9 LBS | HEART RATE: 58 BPM | BODY MASS INDEX: 34.89 KG/M2 | SYSTOLIC BLOOD PRESSURE: 139 MMHG

## 2024-04-18 DIAGNOSIS — R06.09 EXERTIONAL DYSPNEA: ICD-10-CM

## 2024-04-18 DIAGNOSIS — I10 HYPERTENSION, UNSPECIFIED TYPE: ICD-10-CM

## 2024-04-18 DIAGNOSIS — E11.42 POLYNEUROPATHY DUE TO TYPE 2 DIABETES MELLITUS (H): ICD-10-CM

## 2024-04-18 DIAGNOSIS — N17.9 ACUTE KIDNEY INJURY (H): ICD-10-CM

## 2024-04-18 DIAGNOSIS — I50.32 CHRONIC DIASTOLIC CONGESTIVE HEART FAILURE (H): ICD-10-CM

## 2024-04-18 DIAGNOSIS — N18.32 CHRONIC KIDNEY DISEASE, STAGE 3B (H): Primary | ICD-10-CM

## 2024-04-18 DIAGNOSIS — N18.4 TYPE 2 DIABETES MELLITUS WITH STAGE 4 CHRONIC KIDNEY DISEASE, WITHOUT LONG-TERM CURRENT USE OF INSULIN (H): ICD-10-CM

## 2024-04-18 DIAGNOSIS — Z72.0 TOBACCO USER: ICD-10-CM

## 2024-04-18 DIAGNOSIS — E11.22 TYPE 2 DIABETES MELLITUS WITH STAGE 4 CHRONIC KIDNEY DISEASE, WITHOUT LONG-TERM CURRENT USE OF INSULIN (H): ICD-10-CM

## 2024-04-18 DIAGNOSIS — E66.811 OBESITY (BMI 30.0-34.9): ICD-10-CM

## 2024-04-18 DIAGNOSIS — I10 ESSENTIAL HYPERTENSION, BENIGN: ICD-10-CM

## 2024-04-18 DIAGNOSIS — R94.39 ABNORMAL CARDIOVASCULAR STRESS TEST: ICD-10-CM

## 2024-04-18 DIAGNOSIS — Z01.810 PRE-OPERATIVE CARDIOVASCULAR EXAMINATION: ICD-10-CM

## 2024-04-18 DIAGNOSIS — E78.2 MIXED HYPERLIPIDEMIA: ICD-10-CM

## 2024-04-18 LAB
ABO/RH(D): NORMAL
ANION GAP SERPL CALCULATED.3IONS-SCNC: 14 MMOL/L (ref 7–15)
ANTIBODY SCREEN: NEGATIVE
ATRIAL RATE - MUSE: 67 BPM
BUN SERPL-MCNC: 59.9 MG/DL (ref 8–23)
CALCIUM SERPL-MCNC: 9.1 MG/DL (ref 8.8–10.2)
CHLORIDE SERPL-SCNC: 100 MMOL/L (ref 98–107)
CHOLEST SERPL-MCNC: 148 MG/DL
CREAT SERPL-MCNC: 2.63 MG/DL (ref 0.51–0.95)
DEPRECATED HCO3 PLAS-SCNC: 23 MMOL/L (ref 22–29)
DIASTOLIC BLOOD PRESSURE - MUSE: NORMAL MMHG
EGFRCR SERPLBLD CKD-EPI 2021: 19 ML/MIN/1.73M2
ERYTHROCYTE [DISTWIDTH] IN BLOOD BY AUTOMATED COUNT: 12.3 % (ref 10–15)
FASTING STATUS PATIENT QL REPORTED: YES
GLUCOSE SERPL-MCNC: 236 MG/DL (ref 70–99)
HCT VFR BLD AUTO: 37.3 % (ref 35–47)
HDLC SERPL-MCNC: 53 MG/DL
HGB BLD-MCNC: 12.7 G/DL (ref 11.7–15.7)
INTERPRETATION ECG - MUSE: NORMAL
LDLC SERPL CALC-MCNC: 73 MG/DL
MCH RBC QN AUTO: 30.4 PG (ref 26.5–33)
MCHC RBC AUTO-ENTMCNC: 34 G/DL (ref 31.5–36.5)
MCV RBC AUTO: 89 FL (ref 78–100)
NONHDLC SERPL-MCNC: 95 MG/DL
P AXIS - MUSE: 51 DEGREES
PLATELET # BLD AUTO: 189 10E3/UL (ref 150–450)
POTASSIUM SERPL-SCNC: 4.5 MMOL/L (ref 3.4–5.3)
PR INTERVAL - MUSE: 182 MS
QRS DURATION - MUSE: 106 MS
QT - MUSE: 434 MS
QTC - MUSE: 458 MS
R AXIS - MUSE: -46 DEGREES
RBC # BLD AUTO: 4.18 10E6/UL (ref 3.8–5.2)
SODIUM SERPL-SCNC: 137 MMOL/L (ref 135–145)
SPECIMEN EXPIRATION DATE: NORMAL
SYSTOLIC BLOOD PRESSURE - MUSE: NORMAL MMHG
T AXIS - MUSE: 46 DEGREES
TRIGL SERPL-MCNC: 108 MG/DL
VENTRICULAR RATE- MUSE: 67 BPM
WBC # BLD AUTO: 6.6 10E3/UL (ref 4–11)

## 2024-04-18 PROCEDURE — 36415 COLL VENOUS BLD VENIPUNCTURE: CPT | Performed by: INTERNAL MEDICINE

## 2024-04-18 PROCEDURE — 80048 BASIC METABOLIC PNL TOTAL CA: CPT | Performed by: INTERNAL MEDICINE

## 2024-04-18 PROCEDURE — 250N000013 HC RX MED GY IP 250 OP 250 PS 637: Performed by: NURSE PRACTITIONER

## 2024-04-18 PROCEDURE — 99152 MOD SED SAME PHYS/QHP 5/>YRS: CPT | Performed by: INTERNAL MEDICINE

## 2024-04-18 PROCEDURE — C1887 CATHETER, GUIDING: HCPCS | Performed by: INTERNAL MEDICINE

## 2024-04-18 PROCEDURE — C1769 GUIDE WIRE: HCPCS | Performed by: INTERNAL MEDICINE

## 2024-04-18 PROCEDURE — 86900 BLOOD TYPING SEROLOGIC ABO: CPT | Performed by: INTERNAL MEDICINE

## 2024-04-18 PROCEDURE — 255N000002 HC RX 255 OP 636: Performed by: INTERNAL MEDICINE

## 2024-04-18 PROCEDURE — 93458 L HRT ARTERY/VENTRICLE ANGIO: CPT | Mod: 26 | Performed by: INTERNAL MEDICINE

## 2024-04-18 PROCEDURE — 93005 ELECTROCARDIOGRAM TRACING: CPT

## 2024-04-18 PROCEDURE — C1894 INTRO/SHEATH, NON-LASER: HCPCS | Performed by: INTERNAL MEDICINE

## 2024-04-18 PROCEDURE — 93010 ELECTROCARDIOGRAM REPORT: CPT | Performed by: STUDENT IN AN ORGANIZED HEALTH CARE EDUCATION/TRAINING PROGRAM

## 2024-04-18 PROCEDURE — 272N000001 HC OR GENERAL SUPPLY STERILE: Performed by: INTERNAL MEDICINE

## 2024-04-18 PROCEDURE — 999N000054 HC STATISTIC EKG NON-CHARGEABLE

## 2024-04-18 PROCEDURE — 250N000009 HC RX 250: Performed by: INTERNAL MEDICINE

## 2024-04-18 PROCEDURE — 80061 LIPID PANEL: CPT | Performed by: INTERNAL MEDICINE

## 2024-04-18 PROCEDURE — 250N000011 HC RX IP 250 OP 636: Performed by: INTERNAL MEDICINE

## 2024-04-18 PROCEDURE — 258N000003 HC RX IP 258 OP 636: Performed by: INTERNAL MEDICINE

## 2024-04-18 PROCEDURE — 93458 L HRT ARTERY/VENTRICLE ANGIO: CPT | Performed by: INTERNAL MEDICINE

## 2024-04-18 PROCEDURE — 85014 HEMATOCRIT: CPT | Performed by: INTERNAL MEDICINE

## 2024-04-18 RX ORDER — ISOSORBIDE MONONITRATE 30 MG/1
30 TABLET, EXTENDED RELEASE ORAL DAILY
Qty: 30 TABLET | Refills: 12 | Status: SHIPPED | OUTPATIENT
Start: 2024-04-18

## 2024-04-18 RX ORDER — DIAZEPAM 5 MG
5 TABLET ORAL ONCE
Status: COMPLETED | OUTPATIENT
Start: 2024-04-18 | End: 2024-04-18

## 2024-04-18 RX ORDER — NALOXONE HYDROCHLORIDE 0.4 MG/ML
0.2 INJECTION, SOLUTION INTRAMUSCULAR; INTRAVENOUS; SUBCUTANEOUS
Status: DISCONTINUED | OUTPATIENT
Start: 2024-04-18 | End: 2024-04-18 | Stop reason: HOSPADM

## 2024-04-18 RX ORDER — NALOXONE HYDROCHLORIDE 0.4 MG/ML
0.4 INJECTION, SOLUTION INTRAMUSCULAR; INTRAVENOUS; SUBCUTANEOUS
Status: DISCONTINUED | OUTPATIENT
Start: 2024-04-18 | End: 2024-04-18 | Stop reason: HOSPADM

## 2024-04-18 RX ORDER — ACETAMINOPHEN 325 MG/1
650 TABLET ORAL EVERY 4 HOURS PRN
Status: DISCONTINUED | OUTPATIENT
Start: 2024-04-18 | End: 2024-04-18 | Stop reason: HOSPADM

## 2024-04-18 RX ORDER — FENTANYL CITRATE 50 UG/ML
25 INJECTION, SOLUTION INTRAMUSCULAR; INTRAVENOUS
Status: DISCONTINUED | OUTPATIENT
Start: 2024-04-18 | End: 2024-04-18 | Stop reason: HOSPADM

## 2024-04-18 RX ORDER — ASPIRIN 325 MG
325 TABLET ORAL ONCE
Status: COMPLETED | OUTPATIENT
Start: 2024-04-18 | End: 2024-04-18

## 2024-04-18 RX ORDER — ROSUVASTATIN CALCIUM 40 MG/1
40 TABLET, COATED ORAL DAILY
Qty: 90 TABLET | Refills: 3 | Status: SHIPPED | OUTPATIENT
Start: 2024-04-18

## 2024-04-18 RX ORDER — ASPIRIN 81 MG/1
243 TABLET, CHEWABLE ORAL ONCE
Status: COMPLETED | OUTPATIENT
Start: 2024-04-18 | End: 2024-04-18

## 2024-04-18 RX ORDER — FENTANYL CITRATE 50 UG/ML
INJECTION, SOLUTION INTRAMUSCULAR; INTRAVENOUS
Status: DISCONTINUED | OUTPATIENT
Start: 2024-04-18 | End: 2024-04-18 | Stop reason: HOSPADM

## 2024-04-18 RX ORDER — ATROPINE SULFATE 0.1 MG/ML
0.5 INJECTION INTRAVENOUS
Status: DISCONTINUED | OUTPATIENT
Start: 2024-04-18 | End: 2024-04-18 | Stop reason: HOSPADM

## 2024-04-18 RX ORDER — LIDOCAINE 40 MG/G
CREAM TOPICAL
Status: DISCONTINUED | OUTPATIENT
Start: 2024-04-18 | End: 2024-04-18 | Stop reason: HOSPADM

## 2024-04-18 RX ORDER — NICOTINE POLACRILEX 4 MG
15-30 LOZENGE BUCCAL
Status: DISCONTINUED | OUTPATIENT
Start: 2024-04-18 | End: 2024-04-18 | Stop reason: HOSPADM

## 2024-04-18 RX ORDER — IODIXANOL 320 MG/ML
INJECTION, SOLUTION INTRAVASCULAR
Status: DISCONTINUED | OUTPATIENT
Start: 2024-04-18 | End: 2024-04-18 | Stop reason: HOSPADM

## 2024-04-18 RX ORDER — DEXTROSE MONOHYDRATE 25 G/50ML
25-50 INJECTION, SOLUTION INTRAVENOUS
Status: DISCONTINUED | OUTPATIENT
Start: 2024-04-18 | End: 2024-04-18 | Stop reason: HOSPADM

## 2024-04-18 RX ORDER — SODIUM CHLORIDE 9 MG/ML
INJECTION, SOLUTION INTRAVENOUS CONTINUOUS
Status: DISCONTINUED | OUTPATIENT
Start: 2024-04-18 | End: 2024-04-18 | Stop reason: HOSPADM

## 2024-04-18 RX ORDER — OXYCODONE HYDROCHLORIDE 5 MG/1
10 TABLET ORAL EVERY 4 HOURS PRN
Status: DISCONTINUED | OUTPATIENT
Start: 2024-04-18 | End: 2024-04-18 | Stop reason: HOSPADM

## 2024-04-18 RX ORDER — OXYCODONE HYDROCHLORIDE 5 MG/1
5 TABLET ORAL EVERY 4 HOURS PRN
Status: DISCONTINUED | OUTPATIENT
Start: 2024-04-18 | End: 2024-04-18 | Stop reason: HOSPADM

## 2024-04-18 RX ADMIN — SODIUM CHLORIDE 200 ML/HR: 0.9 INJECTION, SOLUTION INTRAVENOUS at 08:09

## 2024-04-18 RX ADMIN — DIAZEPAM 5 MG: 5 TABLET ORAL at 08:46

## 2024-04-18 ASSESSMENT — ACTIVITIES OF DAILY LIVING (ADL)
ADLS_ACUITY_SCORE: 39
ADLS_ACUITY_SCORE: 35
ADLS_ACUITY_SCORE: 39

## 2024-04-18 ASSESSMENT — EJECTION FRACTION: EF_VALUE: .29

## 2024-04-18 NOTE — PRE-PROCEDURE
GENERAL PRE-PROCEDURE:   Procedure:  Coronary angiogram with possible PCI  Date/Time:  4/18/2024 8:44 AM    Written consent obtained?: Yes    Risks and benefits: Risks, benefits and alternatives were discussed    Consent given by:  Patient  Patient states understanding of procedure being performed: Yes    Patient's understanding of procedure matches consent: Yes    Procedure consent matches procedure scheduled: Yes    Expected level of sedation:  Moderate  Appropriately NPO:  Yes  ASA Class:  3 (HFpEF, HTN, HLD, DM Type II, Class I obesity; BMI 34.88kg/m2)  Mallampati  :  Grade 3- soft palate visible, posterior pharyngeal wall not visible  Lungs:  Other (comment)  Lung exam comment:  Diminished throughout  Heart:  Normal heart sounds and rate  History & Physical reviewed:  History and physical reviewed and updates made (see comment)  H&P Comments:  Clinically Significant Risk Factors Present on Admission    Cardiovascular : HFpEF, HTN, HLD, DM Type II, Class I obesity; BMI 34.88kg/m2    Fluid & Electrolyte Disorders : Not present on admission    Gastroenterology : Not present on admission    Hematology/Oncology : Not present on admission    Nephrology : CKD Stage IV; stable    Neurology : Not present on admission    Pulmonology : Not present on admission    Systemic : Class I obesity; BMI 34.88kg/m2    Statement of review:  I have reviewed the lab findings, diagnostic data, medications, and the plan for sedation    Patient has a documented allergy to contrast and has been pre-medicated per protocol prior to procedure. Patient also states that previous reaction to benzodiazepines was not a true allergy but rather intolerance due to unusual fatigue following

## 2024-04-18 NOTE — DISCHARGE INSTRUCTIONS

## 2024-04-18 NOTE — INTERVAL H&P NOTE
"I have reviewed the surgical (or preoperative) H&P that is linked to this encounter, and examined the patient. There are no significant changes    Clinical Conditions Present on Arrival:  Clinically Significant Risk Factors Present on Admission                 # Drug Induced Platelet Defect: home medication list includes an antiplatelet medication  # Obesity: Estimated body mass index is 34.88 kg/m  as calculated from the following:    Height as of this encounter: 1.6 m (5' 3\").    Weight as of this encounter: 89.3 kg (196 lb 14.4 oz).       "

## 2024-05-02 ENCOUNTER — TRANSFERRED RECORDS (OUTPATIENT)
Dept: HEALTH INFORMATION MANAGEMENT | Facility: CLINIC | Age: 69
End: 2024-05-02
Payer: COMMERCIAL

## 2024-05-06 RX ORDER — KETOCONAZOLE 20 MG/ML
SHAMPOO TOPICAL DAILY PRN
COMMUNITY
End: 2024-05-21

## 2024-05-06 RX ORDER — ONDANSETRON 4 MG/1
4 TABLET, FILM COATED ORAL EVERY 8 HOURS PRN
Status: ON HOLD | COMMUNITY
End: 2024-05-07

## 2024-05-07 ENCOUNTER — HOSPITAL ENCOUNTER (OUTPATIENT)
Dept: NUCLEAR MEDICINE | Facility: HOSPITAL | Age: 69
Discharge: HOME | DRG: 734 | End: 2024-05-07
Attending: OBSTETRICS & GYNECOLOGY | Admitting: OBSTETRICS & GYNECOLOGY
Payer: COMMERCIAL

## 2024-05-07 ENCOUNTER — TELEPHONE (OUTPATIENT)
Dept: VASCULAR SURGERY | Facility: CLINIC | Age: 69
End: 2024-05-07
Payer: COMMERCIAL

## 2024-05-07 ENCOUNTER — HOSPITAL ENCOUNTER (INPATIENT)
Facility: HOSPITAL | Age: 69
LOS: 5 days | Discharge: HOME | DRG: 734 | End: 2024-05-12
Attending: OBSTETRICS & GYNECOLOGY | Admitting: OBSTETRICS & GYNECOLOGY
Payer: COMMERCIAL

## 2024-05-07 DIAGNOSIS — C51.0 SQUAMOUS CELL CARCINOMA OF LABIA MAJORA (H): ICD-10-CM

## 2024-05-07 DIAGNOSIS — Z13.9 SCREENING FOR CONDITION: Chronic | ICD-10-CM

## 2024-05-07 DIAGNOSIS — C51.9 VULVAR CANCER (H): Primary | ICD-10-CM

## 2024-05-07 LAB
GLUCOSE BLDC GLUCOMTR-MCNC: 108 MG/DL (ref 70–99)
GLUCOSE BLDC GLUCOMTR-MCNC: 118 MG/DL (ref 70–99)
GLUCOSE BLDC GLUCOMTR-MCNC: 123 MG/DL (ref 70–99)
GLUCOSE BLDC GLUCOMTR-MCNC: 138 MG/DL (ref 70–99)
LAB DIRECTOR COMMENTS: NORMAL
LAB DIRECTOR DISCLAIMER: NORMAL
LAB DIRECTOR INTERPRETATION: NORMAL
LAB DIRECTOR METHODOLOGY: NORMAL
LAB DIRECTOR RESULTS: NORMAL
SPECIMEN DESCRIPTION: NORMAL

## 2024-05-07 PROCEDURE — 250N000013 HC RX MED GY IP 250 OP 250 PS 637: Performed by: PHYSICIAN ASSISTANT

## 2024-05-07 PROCEDURE — 250N000011 HC RX IP 250 OP 636: Performed by: OBSTETRICS & GYNECOLOGY

## 2024-05-07 PROCEDURE — 272N000001 HC OR GENERAL SUPPLY STERILE: Performed by: OBSTETRICS & GYNECOLOGY

## 2024-05-07 PROCEDURE — 343N000001 HC RX 343: Performed by: OBSTETRICS & GYNECOLOGY

## 2024-05-07 PROCEDURE — 258N000003 HC RX IP 258 OP 636: Performed by: ANESTHESIOLOGY

## 2024-05-07 PROCEDURE — 0UTM0ZZ RESECTION OF VULVA, OPEN APPROACH: ICD-10-PCS | Performed by: OBSTETRICS & GYNECOLOGY

## 2024-05-07 PROCEDURE — 999N000141 HC STATISTIC PRE-PROCEDURE NURSING ASSESSMENT: Performed by: OBSTETRICS & GYNECOLOGY

## 2024-05-07 PROCEDURE — 88309 TISSUE EXAM BY PATHOLOGIST: CPT | Mod: TC | Performed by: OBSTETRICS & GYNECOLOGY

## 2024-05-07 PROCEDURE — 250N000011 HC RX IP 250 OP 636: Performed by: NURSE ANESTHETIST, CERTIFIED REGISTERED

## 2024-05-07 PROCEDURE — 250N000009 HC RX 250: Performed by: NURSE ANESTHETIST, CERTIFIED REGISTERED

## 2024-05-07 PROCEDURE — 07BJ0ZZ EXCISION OF LEFT INGUINAL LYMPHATIC, OPEN APPROACH: ICD-10-PCS | Performed by: OBSTETRICS & GYNECOLOGY

## 2024-05-07 PROCEDURE — 38792 RA TRACER ID OF SENTINL NODE: CPT

## 2024-05-07 PROCEDURE — 250N000009 HC RX 250: Performed by: OBSTETRICS & GYNECOLOGY

## 2024-05-07 PROCEDURE — 360N000077 HC SURGERY LEVEL 4, PER MIN: Performed by: OBSTETRICS & GYNECOLOGY

## 2024-05-07 PROCEDURE — A9520 TC99 TILMANOCEPT DIAG 0.5MCI: HCPCS | Performed by: OBSTETRICS & GYNECOLOGY

## 2024-05-07 PROCEDURE — 370N000017 HC ANESTHESIA TECHNICAL FEE, PER MIN: Performed by: OBSTETRICS & GYNECOLOGY

## 2024-05-07 PROCEDURE — 99223 1ST HOSP IP/OBS HIGH 75: CPT | Performed by: STUDENT IN AN ORGANIZED HEALTH CARE EDUCATION/TRAINING PROGRAM

## 2024-05-07 PROCEDURE — 07BH0ZZ EXCISION OF RIGHT INGUINAL LYMPHATIC, OPEN APPROACH: ICD-10-PCS | Performed by: OBSTETRICS & GYNECOLOGY

## 2024-05-07 PROCEDURE — 87624 HPV HI-RISK TYP POOLED RSLT: CPT | Performed by: OBSTETRICS & GYNECOLOGY

## 2024-05-07 PROCEDURE — 710N000010 HC RECOVERY PHASE 1, LEVEL 2, PER MIN: Performed by: OBSTETRICS & GYNECOLOGY

## 2024-05-07 PROCEDURE — G0452 MOLECULAR PATHOLOGY INTERPR: HCPCS | Mod: 26 | Performed by: STUDENT IN AN ORGANIZED HEALTH CARE EDUCATION/TRAINING PROGRAM

## 2024-05-07 PROCEDURE — 250N000011 HC RX IP 250 OP 636: Performed by: ANESTHESIOLOGY

## 2024-05-07 PROCEDURE — 258N000003 HC RX IP 258 OP 636: Performed by: NURSE ANESTHETIST, CERTIFIED REGISTERED

## 2024-05-07 PROCEDURE — 120N000001 HC R&B MED SURG/OB

## 2024-05-07 PROCEDURE — 250N000025 HC SEVOFLURANE, PER MIN: Performed by: OBSTETRICS & GYNECOLOGY

## 2024-05-07 RX ORDER — HYDROMORPHONE HCL IN WATER/PF 6 MG/30 ML
0.4 PATIENT CONTROLLED ANALGESIA SYRINGE INTRAVENOUS EVERY 5 MIN PRN
Status: DISCONTINUED | OUTPATIENT
Start: 2024-05-07 | End: 2024-05-07 | Stop reason: HOSPADM

## 2024-05-07 RX ORDER — ONDANSETRON 2 MG/ML
4 INJECTION INTRAMUSCULAR; INTRAVENOUS EVERY 30 MIN PRN
Status: DISCONTINUED | OUTPATIENT
Start: 2024-05-07 | End: 2024-05-07 | Stop reason: HOSPADM

## 2024-05-07 RX ORDER — CALCIUM CARBONATE 500 MG/1
500 TABLET, CHEWABLE ORAL 4 TIMES DAILY PRN
Status: DISCONTINUED | OUTPATIENT
Start: 2024-05-07 | End: 2024-05-12 | Stop reason: HOSPADM

## 2024-05-07 RX ORDER — NALOXONE HYDROCHLORIDE 0.4 MG/ML
0.4 INJECTION, SOLUTION INTRAMUSCULAR; INTRAVENOUS; SUBCUTANEOUS
Status: DISCONTINUED | OUTPATIENT
Start: 2024-05-07 | End: 2024-05-12 | Stop reason: HOSPADM

## 2024-05-07 RX ORDER — LORAZEPAM 0.5 MG/1
0.5 TABLET ORAL EVERY 6 HOURS PRN
Status: DISCONTINUED | OUTPATIENT
Start: 2024-05-07 | End: 2024-05-12 | Stop reason: HOSPADM

## 2024-05-07 RX ORDER — FUROSEMIDE 40 MG
40 TABLET ORAL
Status: DISCONTINUED | OUTPATIENT
Start: 2024-05-07 | End: 2024-05-07

## 2024-05-07 RX ORDER — LEVOTHYROXINE SODIUM 100 UG/1
100 TABLET ORAL DAILY
Status: DISCONTINUED | OUTPATIENT
Start: 2024-05-07 | End: 2024-05-12 | Stop reason: HOSPADM

## 2024-05-07 RX ORDER — FUROSEMIDE 20 MG
80 TABLET ORAL EVERY MORNING
Status: DISCONTINUED | OUTPATIENT
Start: 2024-05-08 | End: 2024-05-11

## 2024-05-07 RX ORDER — NICOTINE POLACRILEX 4 MG
15-30 LOZENGE BUCCAL
Status: DISCONTINUED | OUTPATIENT
Start: 2024-05-07 | End: 2024-05-12 | Stop reason: HOSPADM

## 2024-05-07 RX ORDER — BUPIVACAINE HYDROCHLORIDE 2.5 MG/ML
INJECTION, SOLUTION INFILTRATION; PERINEURAL PRN
Status: DISCONTINUED | OUTPATIENT
Start: 2024-05-07 | End: 2024-05-07 | Stop reason: HOSPADM

## 2024-05-07 RX ORDER — ROSUVASTATIN CALCIUM 40 MG/1
40 TABLET, COATED ORAL DAILY
Status: DISCONTINUED | OUTPATIENT
Start: 2024-05-07 | End: 2024-05-08

## 2024-05-07 RX ORDER — ACETAMINOPHEN 325 MG/1
650 TABLET ORAL EVERY 6 HOURS PRN
Status: DISCONTINUED | OUTPATIENT
Start: 2024-05-07 | End: 2024-05-09

## 2024-05-07 RX ORDER — HYDROMORPHONE HCL IN WATER/PF 6 MG/30 ML
0.1 PATIENT CONTROLLED ANALGESIA SYRINGE INTRAVENOUS
Status: DISCONTINUED | OUTPATIENT
Start: 2024-05-07 | End: 2024-05-12 | Stop reason: HOSPADM

## 2024-05-07 RX ORDER — PROCHLORPERAZINE MALEATE 5 MG
5 TABLET ORAL EVERY 6 HOURS PRN
Status: DISCONTINUED | OUTPATIENT
Start: 2024-05-07 | End: 2024-05-12 | Stop reason: HOSPADM

## 2024-05-07 RX ORDER — OXYCODONE HYDROCHLORIDE 5 MG/1
5 TABLET ORAL
Status: DISCONTINUED | OUTPATIENT
Start: 2024-05-07 | End: 2024-05-07 | Stop reason: HOSPADM

## 2024-05-07 RX ORDER — SODIUM CHLORIDE, SODIUM LACTATE, POTASSIUM CHLORIDE, CALCIUM CHLORIDE 600; 310; 30; 20 MG/100ML; MG/100ML; MG/100ML; MG/100ML
INJECTION, SOLUTION INTRAVENOUS CONTINUOUS
Status: DISCONTINUED | OUTPATIENT
Start: 2024-05-07 | End: 2024-05-07 | Stop reason: HOSPADM

## 2024-05-07 RX ORDER — PROPOFOL 10 MG/ML
INJECTION, EMULSION INTRAVENOUS CONTINUOUS PRN
Status: DISCONTINUED | OUTPATIENT
Start: 2024-05-07 | End: 2024-05-07

## 2024-05-07 RX ORDER — AMLODIPINE BESYLATE 5 MG/1
5 TABLET ORAL DAILY
Status: DISCONTINUED | OUTPATIENT
Start: 2024-05-07 | End: 2024-05-12 | Stop reason: HOSPADM

## 2024-05-07 RX ORDER — NALOXONE HYDROCHLORIDE 0.4 MG/ML
0.1 INJECTION, SOLUTION INTRAMUSCULAR; INTRAVENOUS; SUBCUTANEOUS
Status: DISCONTINUED | OUTPATIENT
Start: 2024-05-07 | End: 2024-05-07 | Stop reason: HOSPADM

## 2024-05-07 RX ORDER — CEFAZOLIN SODIUM/WATER 2 G/20 ML
2 SYRINGE (ML) INTRAVENOUS
Status: COMPLETED | OUTPATIENT
Start: 2024-05-07 | End: 2024-05-07

## 2024-05-07 RX ORDER — OXYCODONE HYDROCHLORIDE 5 MG/1
5 TABLET ORAL
Status: DISCONTINUED | OUTPATIENT
Start: 2024-05-07 | End: 2024-05-09

## 2024-05-07 RX ORDER — PANTOPRAZOLE SODIUM 40 MG/1
40 TABLET, DELAYED RELEASE ORAL
Status: DISCONTINUED | OUTPATIENT
Start: 2024-05-08 | End: 2024-05-12 | Stop reason: HOSPADM

## 2024-05-07 RX ORDER — LIDOCAINE 40 MG/G
CREAM TOPICAL
Status: DISCONTINUED | OUTPATIENT
Start: 2024-05-07 | End: 2024-05-12 | Stop reason: HOSPADM

## 2024-05-07 RX ORDER — FUROSEMIDE 20 MG
40 TABLET ORAL EVERY EVENING
Status: DISCONTINUED | OUTPATIENT
Start: 2024-05-07 | End: 2024-05-12 | Stop reason: HOSPADM

## 2024-05-07 RX ORDER — PROPOFOL 10 MG/ML
INJECTION, EMULSION INTRAVENOUS PRN
Status: DISCONTINUED | OUTPATIENT
Start: 2024-05-07 | End: 2024-05-07

## 2024-05-07 RX ORDER — LIDOCAINE HYDROCHLORIDE 10 MG/ML
INJECTION, SOLUTION INFILTRATION; PERINEURAL PRN
Status: DISCONTINUED | OUTPATIENT
Start: 2024-05-07 | End: 2024-05-07

## 2024-05-07 RX ORDER — AMOXICILLIN AND CLAVULANATE POTASSIUM 500; 125 MG/1; MG/1
1 TABLET, FILM COATED ORAL 2 TIMES DAILY
Qty: 14 TABLET | Refills: 0 | Status: SHIPPED | OUTPATIENT
Start: 2024-05-07 | End: 2024-05-09

## 2024-05-07 RX ORDER — FENTANYL CITRATE 50 UG/ML
25 INJECTION, SOLUTION INTRAMUSCULAR; INTRAVENOUS
Status: DISCONTINUED | OUTPATIENT
Start: 2024-05-07 | End: 2024-05-07 | Stop reason: HOSPADM

## 2024-05-07 RX ORDER — HYDROXYZINE HYDROCHLORIDE 10 MG/1
10 TABLET, FILM COATED ORAL EVERY 6 HOURS PRN
Status: DISCONTINUED | OUTPATIENT
Start: 2024-05-07 | End: 2024-05-12 | Stop reason: HOSPADM

## 2024-05-07 RX ORDER — KETAMINE HYDROCHLORIDE 10 MG/ML
INJECTION INTRAMUSCULAR; INTRAVENOUS PRN
Status: DISCONTINUED | OUTPATIENT
Start: 2024-05-07 | End: 2024-05-07

## 2024-05-07 RX ORDER — ONDANSETRON 2 MG/ML
4 INJECTION INTRAMUSCULAR; INTRAVENOUS EVERY 6 HOURS PRN
Status: DISCONTINUED | OUTPATIENT
Start: 2024-05-07 | End: 2024-05-12 | Stop reason: HOSPADM

## 2024-05-07 RX ORDER — AMOXICILLIN 250 MG
1-2 CAPSULE ORAL 2 TIMES DAILY PRN
COMMUNITY
Start: 2024-05-07 | End: 2024-06-24

## 2024-05-07 RX ORDER — DEXAMETHASONE SODIUM PHOSPHATE 4 MG/ML
INJECTION, SOLUTION INTRA-ARTICULAR; INTRALESIONAL; INTRAMUSCULAR; INTRAVENOUS; SOFT TISSUE PRN
Status: DISCONTINUED | OUTPATIENT
Start: 2024-05-07 | End: 2024-05-07

## 2024-05-07 RX ORDER — ONDANSETRON 4 MG/1
4 TABLET, ORALLY DISINTEGRATING ORAL EVERY 6 HOURS PRN
Status: DISCONTINUED | OUTPATIENT
Start: 2024-05-07 | End: 2024-05-12 | Stop reason: HOSPADM

## 2024-05-07 RX ORDER — NALOXONE HYDROCHLORIDE 0.4 MG/ML
0.2 INJECTION, SOLUTION INTRAMUSCULAR; INTRAVENOUS; SUBCUTANEOUS
Status: DISCONTINUED | OUTPATIENT
Start: 2024-05-07 | End: 2024-05-12 | Stop reason: HOSPADM

## 2024-05-07 RX ORDER — CLINDAMYCIN PHOSPHATE 900 MG/50ML
900 INJECTION, SOLUTION INTRAVENOUS SEE ADMIN INSTRUCTIONS
Status: DISCONTINUED | OUTPATIENT
Start: 2024-05-07 | End: 2024-05-07 | Stop reason: ALTCHOICE

## 2024-05-07 RX ORDER — DEXAMETHASONE SODIUM PHOSPHATE 4 MG/ML
4 INJECTION, SOLUTION INTRA-ARTICULAR; INTRALESIONAL; INTRAMUSCULAR; INTRAVENOUS; SOFT TISSUE
Status: DISCONTINUED | OUTPATIENT
Start: 2024-05-07 | End: 2024-05-07 | Stop reason: HOSPADM

## 2024-05-07 RX ORDER — LIDOCAINE 40 MG/G
CREAM TOPICAL
Status: DISCONTINUED | OUTPATIENT
Start: 2024-05-07 | End: 2024-05-07

## 2024-05-07 RX ORDER — LOSARTAN POTASSIUM 50 MG/1
50 TABLET ORAL DAILY
Status: DISCONTINUED | OUTPATIENT
Start: 2024-05-07 | End: 2024-05-12

## 2024-05-07 RX ORDER — SILVER SULFADIAZINE 10 MG/G
CREAM TOPICAL PRN
Status: DISCONTINUED | OUTPATIENT
Start: 2024-05-07 | End: 2024-05-07 | Stop reason: HOSPADM

## 2024-05-07 RX ORDER — ALBUTEROL SULFATE 0.83 MG/ML
2.5 SOLUTION RESPIRATORY (INHALATION) EVERY 4 HOURS PRN
Status: DISCONTINUED | OUTPATIENT
Start: 2024-05-07 | End: 2024-05-07 | Stop reason: HOSPADM

## 2024-05-07 RX ORDER — OXYCODONE HYDROCHLORIDE 5 MG/1
5 TABLET ORAL EVERY 4 HOURS PRN
Qty: 8 TABLET | Refills: 0 | Status: SHIPPED | OUTPATIENT
Start: 2024-05-07 | End: 2024-05-10

## 2024-05-07 RX ORDER — HYDROMORPHONE HCL IN WATER/PF 6 MG/30 ML
0.2 PATIENT CONTROLLED ANALGESIA SYRINGE INTRAVENOUS
Status: DISCONTINUED | OUTPATIENT
Start: 2024-05-07 | End: 2024-05-12 | Stop reason: HOSPADM

## 2024-05-07 RX ORDER — ACETAMINOPHEN 325 MG/1
975 TABLET ORAL EVERY 6 HOURS PRN
COMMUNITY
Start: 2024-05-07

## 2024-05-07 RX ORDER — LORAZEPAM 2 MG/ML
0.5 INJECTION INTRAMUSCULAR ONCE
Status: COMPLETED | OUTPATIENT
Start: 2024-05-07 | End: 2024-05-07

## 2024-05-07 RX ORDER — ONDANSETRON 2 MG/ML
INJECTION INTRAMUSCULAR; INTRAVENOUS PRN
Status: DISCONTINUED | OUTPATIENT
Start: 2024-05-07 | End: 2024-05-07

## 2024-05-07 RX ORDER — CLINDAMYCIN PHOSPHATE 900 MG/50ML
900 INJECTION, SOLUTION INTRAVENOUS
Status: DISCONTINUED | OUTPATIENT
Start: 2024-05-07 | End: 2024-05-07 | Stop reason: ALTCHOICE

## 2024-05-07 RX ORDER — CEFAZOLIN SODIUM/WATER 2 G/20 ML
2 SYRINGE (ML) INTRAVENOUS SEE ADMIN INSTRUCTIONS
Status: DISCONTINUED | OUTPATIENT
Start: 2024-05-07 | End: 2024-05-07 | Stop reason: HOSPADM

## 2024-05-07 RX ORDER — METOPROLOL TARTRATE 25 MG/1
100 TABLET, FILM COATED ORAL 2 TIMES DAILY
Status: DISCONTINUED | OUTPATIENT
Start: 2024-05-07 | End: 2024-05-12 | Stop reason: HOSPADM

## 2024-05-07 RX ORDER — FENTANYL CITRATE 50 UG/ML
25 INJECTION, SOLUTION INTRAMUSCULAR; INTRAVENOUS EVERY 5 MIN PRN
Status: DISCONTINUED | OUTPATIENT
Start: 2024-05-07 | End: 2024-05-07 | Stop reason: HOSPADM

## 2024-05-07 RX ORDER — ONDANSETRON 4 MG/1
4 TABLET, ORALLY DISINTEGRATING ORAL EVERY 30 MIN PRN
Status: DISCONTINUED | OUTPATIENT
Start: 2024-05-07 | End: 2024-05-07 | Stop reason: HOSPADM

## 2024-05-07 RX ORDER — ALBUTEROL SULFATE 90 UG/1
2 AEROSOL, METERED RESPIRATORY (INHALATION) EVERY 4 HOURS PRN
Status: DISCONTINUED | OUTPATIENT
Start: 2024-05-07 | End: 2024-05-12 | Stop reason: HOSPADM

## 2024-05-07 RX ORDER — FENTANYL CITRATE 50 UG/ML
INJECTION, SOLUTION INTRAMUSCULAR; INTRAVENOUS PRN
Status: DISCONTINUED | OUTPATIENT
Start: 2024-05-07 | End: 2024-05-07

## 2024-05-07 RX ORDER — FLUTICASONE FUROATE AND VILANTEROL 100; 25 UG/1; UG/1
1 POWDER RESPIRATORY (INHALATION) DAILY
Status: DISCONTINUED | OUTPATIENT
Start: 2024-05-07 | End: 2024-05-12 | Stop reason: HOSPADM

## 2024-05-07 RX ORDER — OXYCODONE HYDROCHLORIDE 5 MG/1
5 TABLET ORAL EVERY 4 HOURS PRN
Status: DISCONTINUED | OUTPATIENT
Start: 2024-05-07 | End: 2024-05-12 | Stop reason: HOSPADM

## 2024-05-07 RX ORDER — DEXTROSE MONOHYDRATE 25 G/50ML
25-50 INJECTION, SOLUTION INTRAVENOUS
Status: DISCONTINUED | OUTPATIENT
Start: 2024-05-07 | End: 2024-05-12 | Stop reason: HOSPADM

## 2024-05-07 RX ORDER — ISOSORBIDE MONONITRATE 30 MG/1
30 TABLET, EXTENDED RELEASE ORAL DAILY
Status: DISCONTINUED | OUTPATIENT
Start: 2024-05-07 | End: 2024-05-12 | Stop reason: HOSPADM

## 2024-05-07 RX ADMIN — FENTANYL CITRATE 25 MCG: 50 INJECTION, SOLUTION INTRAMUSCULAR; INTRAVENOUS at 17:26

## 2024-05-07 RX ADMIN — FENTANYL CITRATE 50 MCG: 50 INJECTION INTRAMUSCULAR; INTRAVENOUS at 16:05

## 2024-05-07 RX ADMIN — Medication 2 G: at 14:44

## 2024-05-07 RX ADMIN — KETAMINE HYDROCHLORIDE 10 MG: 10 INJECTION INTRAMUSCULAR; INTRAVENOUS at 16:01

## 2024-05-07 RX ADMIN — ROCURONIUM BROMIDE 10 MG: 50 INJECTION, SOLUTION INTRAVENOUS at 15:59

## 2024-05-07 RX ADMIN — TILMANOCEPT 1.54 MILLICURIE: KIT at 13:07

## 2024-05-07 RX ADMIN — LOSARTAN POTASSIUM 50 MG: 50 TABLET, FILM COATED ORAL at 19:33

## 2024-05-07 RX ADMIN — SUGAMMADEX 200 MG: 100 INJECTION, SOLUTION INTRAVENOUS at 16:08

## 2024-05-07 RX ADMIN — MIDAZOLAM 2 MG: 1 INJECTION INTRAMUSCULAR; INTRAVENOUS at 14:13

## 2024-05-07 RX ADMIN — DEXMEDETOMIDINE HYDROCHLORIDE 8 MCG: 100 INJECTION, SOLUTION INTRAVENOUS at 14:55

## 2024-05-07 RX ADMIN — METOPROLOL TARTRATE 100 MG: 25 TABLET, FILM COATED ORAL at 19:32

## 2024-05-07 RX ADMIN — PROPOFOL 100 MG: 10 INJECTION, EMULSION INTRAVENOUS at 14:31

## 2024-05-07 RX ADMIN — ROCURONIUM BROMIDE 50 MG: 50 INJECTION, SOLUTION INTRAVENOUS at 14:31

## 2024-05-07 RX ADMIN — SODIUM CHLORIDE, POTASSIUM CHLORIDE, SODIUM LACTATE AND CALCIUM CHLORIDE: 600; 310; 30; 20 INJECTION, SOLUTION INTRAVENOUS at 18:03

## 2024-05-07 RX ADMIN — FENTANYL CITRATE 100 MCG: 50 INJECTION INTRAMUSCULAR; INTRAVENOUS at 14:31

## 2024-05-07 RX ADMIN — PHENYLEPHRINE HYDROCHLORIDE 0.2 MCG/KG/MIN: 10 INJECTION INTRAVENOUS at 14:42

## 2024-05-07 RX ADMIN — FLUTICASONE FUROATE AND VILANTEROL TRIFENATATE 1 PUFF: 100; 25 POWDER RESPIRATORY (INHALATION) at 19:34

## 2024-05-07 RX ADMIN — LEVOTHYROXINE SODIUM 100 MCG: 100 TABLET ORAL at 19:33

## 2024-05-07 RX ADMIN — AMLODIPINE BESYLATE 5 MG: 5 TABLET ORAL at 19:33

## 2024-05-07 RX ADMIN — SODIUM CHLORIDE, POTASSIUM CHLORIDE, SODIUM LACTATE AND CALCIUM CHLORIDE: 600; 310; 30; 20 INJECTION, SOLUTION INTRAVENOUS at 16:16

## 2024-05-07 RX ADMIN — ROSUVASTATIN CALCIUM 40 MG: 40 TABLET, COATED ORAL at 21:58

## 2024-05-07 RX ADMIN — SODIUM CHLORIDE, POTASSIUM CHLORIDE, SODIUM LACTATE AND CALCIUM CHLORIDE: 600; 310; 30; 20 INJECTION, SOLUTION INTRAVENOUS at 12:19

## 2024-05-07 RX ADMIN — DEXMEDETOMIDINE HYDROCHLORIDE 8 MCG: 100 INJECTION, SOLUTION INTRAVENOUS at 14:13

## 2024-05-07 RX ADMIN — LIDOCAINE HYDROCHLORIDE 5 ML: 10 INJECTION, SOLUTION INFILTRATION; PERINEURAL at 14:31

## 2024-05-07 RX ADMIN — OXYCODONE HYDROCHLORIDE 5 MG: 5 TABLET ORAL at 19:35

## 2024-05-07 RX ADMIN — ONDANSETRON 4 MG: 2 INJECTION INTRAMUSCULAR; INTRAVENOUS at 14:51

## 2024-05-07 RX ADMIN — DEXAMETHASONE SODIUM PHOSPHATE 5 MG: 4 INJECTION, SOLUTION INTRA-ARTICULAR; INTRALESIONAL; INTRAMUSCULAR; INTRAVENOUS; SOFT TISSUE at 14:51

## 2024-05-07 RX ADMIN — FENTANYL CITRATE 25 MCG: 50 INJECTION, SOLUTION INTRAMUSCULAR; INTRAVENOUS at 17:13

## 2024-05-07 RX ADMIN — ISOSORBIDE MONONITRATE 30 MG: 30 TABLET, EXTENDED RELEASE ORAL at 19:33

## 2024-05-07 RX ADMIN — HYDROMORPHONE HYDROCHLORIDE 0.4 MG: 0.2 INJECTION, SOLUTION INTRAMUSCULAR; INTRAVENOUS; SUBCUTANEOUS at 17:36

## 2024-05-07 RX ADMIN — KETAMINE HYDROCHLORIDE 20 MG: 10 INJECTION INTRAMUSCULAR; INTRAVENOUS at 15:00

## 2024-05-07 RX ADMIN — FUROSEMIDE 40 MG: 20 TABLET ORAL at 19:47

## 2024-05-07 RX ADMIN — PROPOFOL 100 MCG/KG/MIN: 10 INJECTION, EMULSION INTRAVENOUS at 14:31

## 2024-05-07 ASSESSMENT — ACTIVITIES OF DAILY LIVING (ADL)
ADLS_ACUITY_SCORE: 20

## 2024-05-07 NOTE — PROCEDURES
POST OPERATIVE NOTE    Preoperative Diagnosis:  Squamous cell carcinoma of vulva (H) [C51.9]    Postoperative Diagnosis:  Same    CLINICAL STAGE: T: 1B  N: X M: 0   FIGO: Grade 1    NATIONAL GUIDELINE REFERENCED FOR TREATMENT PLANNING:NCCN    Procedures:  Modified radical vulvectomy  [place] lymph node injection and bilateral inguinal lymph node dissection    Prosthetic Devices:  None    Surgeon(s) and Assistants (if any):  Surgeon(s):  Lorelei Aburto MD Barkman, Marguerite Anglin PA-C  Circulator: Chula Bowling RN; Jordyn Arndt RN  Relief Circulator: Schoenecker, Carla J, RN  Relief Scrub: Renata Nelson  Scrub Person: Arjun Beltran    Anesthesia:  General    Drains:  none    Specimens:  [place] lymph node #1 left inguinal  Right inguinal lymph nodes  [place] lymph node #1 right inguinal  Bilateral anterior vulva with clitoris, stitch at urethral margin    Complications: none    Findings/Conclusions: Benign appearing bilateral inguinal sentinel lymph nodes.  Very clear hot sentinel lymph node with methylene blue dye on the left.  Minimal radiocolloid activity on the right with no visible colorimetric dye.  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  Distance from the urethra was 1 cm.     PROCEDURE:  Mariama Keene is a 74 y.o.-year-old female who presented in early March with vaginal irritation and soreness.  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  HPV negative.  Mariama Mahan was counseled as to the recommended procedure.  The risks, benefits and alternatives were discussed in detail.  The standard of care procedure was detailed.  Mariama Mahan had some reservations and initially was considering primary radiotherapy in  "conjunction with chemotherapy however opted for definitive surgical management.  She presented today to the preoperative area where everything was reviewed again.  The consent was signed in the preoperative area.  She was subsequently brought to the operating room where general anesthesia was found to be adequate. She was prepared and draped in the normal sterile fashion in high lithotomy position. Prior to prepping, a total of 8 mL of methylene blue was injected intradermally at 3 different sites along the bilateral aspects of the periclitoral tumor.  Additionally, three 1 cc injections of technetium-99 were placed within the preoperative area with 3 injections along the left lateral aspect and 3 injections along the right lateral aspect of the periclitoral tumor.  A briefing and timeout were performed prior to initiating the procedure.  An exam under anesthesia revealed the findings as per above.     Attention was then turned to the bilateral inguinofemoral altaf mapping and sentinel lymph node identification. The Helga counter was used to map out the bilateral sentinel lymph nodes.  The Meadow counter was able to identify \"hot\" sentinel lymph nodes immediately lateral and just inferior to the pubic tubercle. On the left, a 4 cm skin incision was created with the Bovie on cut. The subcutaneous fatty layers were dissected deep to ralph's layer.  The neoprobe very clearly identified the sentinel lymph node on the left and subsequently blue dye was very clearly visible.  This individual lymph node was dissected without issue.  The activity was verified off the field and sent as sentinel lymph node #1 left inguinal.  Following, the subcutaneous tissues were re-approximated with deep, vertical mattress sutures of 2-0 vicryl. The skin was re-approximated with 3-0 monocryl in a subcuticular fashion.  The skin edges were reapproximated with a 3 oh strata fix Monocryl suture.  Attention was then turned to the right " "groin. In a similar fashion, the Helga counter had been utilized to identify the \"hot\" nodes just lateral to the pubic tubercle and immediately inferior to the inguinofemoral ligament. In a similar fashion, a 4 cm skin incision was created.  There was increased difficulty in clearly delineating the active sentinel lymph node on the right.  Following a substantial amount of dissection of the superficial inguinal lymph nodes which were subsequently collected and sent as right inguinal lymph nodes, the neoprobe was used to identify a hot lymph node without any blue dye just deep to this area.  The lymph node was overlying the inguinal vein.  There was a small amount of bleeding from a vein within this location which was controlled with bipolar cautery and also a figure-of-eight suture with 3-0 Vicryl.  Once the sentinel lymph node was completely dissected, it was sent as sentinel lymph node #1 left inguinal.  There was no identifiable blue dye, however, the altaf packet was confirmed radioactive outside of the patient. In a similar fashion, the subcutaneous tissues were re-approximated with 2-0 vicryl in a vertical mattress and the skin re-approximated with 3-0 monocryl. Attention was then turned to the vulva.     The resection site was mapped out including the bilateral anterior labia majora and labia minora due to the periclitoral positioning of the tumor.  A 2 cm margin was mapped out circumferentially which essentially created an inverted heart-shaped area for resection.  The Bovie was used to incise through the dermal layer and underlying subcutaneous tissues were dissected on coag.  Subsequently the hand-held LigaSure sealing device was utilized to dissected underlying vasculature, subcutaneous tissues deep to the perineal fascial layer.  The Ligasure sealing device was also used for hemostasis. The perineal and deep clitoral vessels were coagulated. The urogenital diaphragm was reached and the base of the " excision then fully transected with the handheld ligasure device.  The clitoris additionally was transected with a hand-held LigaSure sealing device.  The resection came just to the anterior edge of the urethra.  A stitch was placed on the anterior urethral margin with silk for orientation.  Following, the area was evaluated for closure. The deep tissues again were re-approximated with deep vertical mattress sutures of 2-0 vicryl. The skin edges were then closed to running 3-0 Monocryl strata fix sutures.    At the completion, all sites were injected with 0.25% marcaine without epinephrine.  A total of 40 mL was utilized.  The bilateral inguinofemoral incisions were covered with a layer of dermabond. All incision sites were hemostatic and a fluff placed over the vulvar excision.  Silvadene cream was also placed over the vulvar excision site.  All sponge, lap and needle counts were correct x2 at the completion of the procedure.     Marguerite Bhatia PA-C assisted me throughout all portions of the case and was vital in it's completion. She assisted with retraction, adequate visualization, and closure.     Lorelei Altamirano MD  Gynecologic Oncologist  [facility]  [place] [facility]  [phone]

## 2024-05-07 NOTE — TELEPHONE ENCOUNTER
Patient canceled the consult with Dr. Parikh on 5/9/24 due to being scheduled for an unrelated surgery and will be hospitalized at the time of the appointment.  She asks for a call back in 1-2 weeks if we have not heard from her to reschedule. [phone]

## 2024-05-07 NOTE — ANESTHESIA PROCEDURE NOTES
Airway       Patient location during procedure: OR       Procedure Start/Stop Times: 5/7/2024 2:34 PM and 5/7/2024 2:36 PM  Staff -        CRNA: Enrique Starkey APRN CRNA       Performed By: CRNA  Consent for Airway        Urgency: elective  Indications and Patient Condition       Indications for airway management: sparkle-procedural       Induction type:intravenous       Mask difficulty assessment: 2 - vent by mask + OA or adjuvant +/- NMBA    Final Airway Details       Final airway type: endotracheal airway       Successful airway: ETT - single  Endotracheal Airway Details        ETT size (mm): 6.5       Cuffed: yes       Successful intubation technique: direct laryngoscopy       DL Blade Type: MAC 3       Grade View of Cords: 1       Adjucts: stylet       Position: Right       Measured from: gums/teeth       Secured at (cm): 21       Bite block used: Oral Airway    Post intubation assessment        Placement verified by: capnometry, equal breath sounds and chest rise        Number of attempts at approach: 1       Secured with: tape       Ease of procedure: easy       Dentition: Intact and Unchanged    Medication(s) Administered   Medication Administration Time: 5/7/2024 2:34 PM

## 2024-05-07 NOTE — INTERVAL H&P NOTE
I have seen and examined the patient. There are no updates or changes to the preoperative history and physical.    Lorelei Altamirano MD  Gynecologic Oncology  [facility]  [facility]: [phone]

## 2024-05-07 NOTE — ANESTHESIA PREPROCEDURE EVALUATION
Anesthesia Pre-Procedure Evaluation    Patient: Mariama Keene   MRN: [id number] : 1955        Procedure : Procedure(s):  MODIFIED RADICAL VULVECTOMY,  BILATERAL SENTINEL LYMPH NODE INJECTION AND BIOPSY          Past Medical History:   Diagnosis Date    Asthma     Asthma     Asthma     Congestive heart failure (H)     Diabetes (H)     Diabetes (H)     Diabetes (H)     Hypertension     Hypertension     Hypertension     Panic attacks     Panic attacks     Panic attacks       Past Surgical History:   Procedure Laterality Date    ABSCESS DRAINAGE      On back    APPENDECTOMY      CHOLECYSTECTOMY      CV CORONARY ANGIOGRAM N/A 2024    Procedure: Coronary Angiogram;  Surgeon: Casa Vasquez MD;  Location: [facility] CATH LAB CV    CV LEFT HEART CATH N/A 2024    Procedure: Left Heart Catheterization;  Surgeon: Casa Vasquez MD;  Location: [facility] CATH LAB CV    HYSTERECTOMY      age 33 - partial    NEPHRECTOMY PARTIAL Right     OOPHORECTOMY      age 48      Allergies   Allergen Reactions    Alprazolam Other (See Comments)    Cephalexin Rash and Swelling    Exenatide Other (See Comments)    Hydralazine      Flush and Elevated BP     Iodinated Contrast Media [Iodinated Contrast Media] Unknown    Lisinopril Other (See Comments)    Sulfa (Sulfonamide Antibiotics) [Sulfa Antibiotics] Other (See Comments)    Macrobid [Nitrofurantoin] Rash      Social History     Tobacco Use    Smoking status: Former     Current packs/day: 0.00     Types: Cigarettes     Quit date: 1992     Years since quittin.3    Smokeless tobacco: Never   Substance Use Topics    Alcohol use: Yes      Wt Readings from Last 1 Encounters:   24 88.8 kg (195 lb 12.8 oz)        Anesthesia Evaluation            ROS/MED HX  ENT/Pulmonary:     (+)                      asthma                  Neurologic:  - neg neurologic ROS     Cardiovascular:     (+) Dyslipidemia hypertension- -  CAD -  - -      CHF  Last EF: 65                "          Previous cardiac testing   Echo: Date: Results:    Stress Test:  Date: Results:  Large area of ischemia with strees-anterior, ant-lat.  ECG Reviewed:  Date: Results:    Cath:  Date: Results:    - moderate disease in LAD and Lcx, severe disease in small RCA; mildly elevated L-sided filling pressures       METS/Exercise Tolerance:     Hematologic:       Musculoskeletal:       GI/Hepatic:     (+) GERD,                   Renal/Genitourinary:     (+) renal disease, type: CRI, Pt does not require dialysis,           Endo:     (+)  type II DM,        thyroid problem,     Obesity,       Psychiatric/Substance Use:  - neg psychiatric ROS     Infectious Disease:       Malignancy:   (+) Malignancy, History of Other.    Other:  - neg other ROS             OUTSIDE LABS:  CBC:   Lab Results   Component Value Date    WBC 6.6 04/18/2024    WBC 5.7 04/04/2024    HGB 12.7 04/18/2024    HGB 12.3 04/04/2024    HCT 37.3 04/18/2024    HCT 36.0 04/04/2024     04/18/2024     04/04/2024     BMP:   Lab Results   Component Value Date     04/18/2024     04/09/2024    POTASSIUM 4.5 04/18/2024    POTASSIUM 4.4 04/09/2024    CHLORIDE 100 04/18/2024    CHLORIDE 101 04/09/2024    CO2 23 04/18/2024    CO2 22 04/09/2024    BUN 59.9 (H) 04/18/2024    BUN 50.2 (H) 04/09/2024    CR 2.63 (H) 04/18/2024    CR 2.62 (H) 04/09/2024     (H) 05/07/2024     (H) 04/18/2024     COAGS:   Lab Results   Component Value Date    PTT 24 11/28/2022    INR 1.00 11/28/2022    FIBR 607 (H) 08/20/2020     POC: No results found for: \"BGM\", \"HCG\", \"HCGS\"  HEPATIC:   Lab Results   Component Value Date    ALBUMIN 4.3 11/13/2023    PROTTOTAL 7.1 11/13/2023    ALT 14 11/13/2023    AST 19 11/13/2023    ALKPHOS 69 11/13/2023    BILITOTAL 0.7 11/13/2023     OTHER:   Lab Results   Component Value Date    A1C 10.2 (H) 11/28/2022    BILL 9.1 04/18/2024    MAG 2.1 12/01/2022    LIPASE 31 08/16/2020    TSH 1.14 11/13/2023    T4 0.77 (L) " "11/28/2022    CRP 2.9 (H) 08/20/2020       Anesthesia Plan    ASA Status:  4    NPO Status:  NPO Appropriate    Anesthesia Type: General.     - Airway: ETT   Induction: Intravenous, Propofol.   Maintenance: Balanced.        Consents    Anesthesia Plan(s) and associated risks, benefits, and realistic alternatives discussed. Questions answered and patient/representative(s) expressed understanding.     - Discussed:     - Discussed with:  Patient      - Extended Intubation/Ventilatory Support Discussed: No.      - Patient is DNR/DNI Status: No     Use of blood products discussed: No .     Postoperative Care    Pain management: Oral pain medications.   PONV prophylaxis: Ondansetron (or other 5HT-3), Dexamethasone or Solumedrol, Background Propofol Infusion     Comments:               Troy Noland MD    I have reviewed the pertinent notes and labs in the chart from the past 30 days and (re)examined the patient.  Any updates or changes from those notes are reflected in this note.             # Drug Induced Platelet Defect: home medication list includes an antiplatelet medication  # Obesity: Estimated body mass index is 34.68 kg/m  as calculated from the following:    Height as of 4/18/24: 1.6 m (5' 3\").    Weight as of this encounter: 88.8 kg (195 lb 12.8 oz).      "

## 2024-05-07 NOTE — PHARMACY-ADMISSION MEDICATION HISTORY
Pharmacist Admission Medication History    Admission medication history is complete. The information provided in this note is only as accurate as the sources available at the time of the update.    Information Source(s): Patient, Clinic records, and CareEverywhere/SureScripts via in-person    Pertinent Information: n/a    Allergies reviewed with patient and updates made in EHR: yes    Medication History Completed By: Tania Wu RPH 5/7/2024 12:59 PM    PTA Med List   Medication Sig Last Dose    amLODIPine (NORVASC) 5 MG tablet Take 5 mg by mouth daily 5/6/2024 at am    aspirin (ASA) 325 MG EC tablet Take 325 mg by mouth every 6 hours as needed for moderate pain Past Month at unknown    furosemide (LASIX) 40 MG tablet Take by mouth 2 times daily 80 mg in the morning and 40 mg in the evening 5/6/2024 at pm    isosorbide mononitrate (IMDUR) 30 MG 24 hr tablet Take 1 tablet (30 mg) by mouth daily 5/6/2024 at pm    ketoconazole (NIZORAL) 2 % external shampoo Apply topically daily as needed for itching or irritation Unknown at unknown    levothyroxine (SYNTHROID/LEVOTHROID) 100 MCG tablet Take 1 tablet (100 mcg) by mouth daily 5/5/2024 at am    lidocaine (XYLOCAINE) 5 % external ointment Apply topically 4 times daily as needed for moderate pain Past Week at unknown    LORazepam (ATIVAN) 0.5 MG tablet Take 0.5 mg by mouth daily as needed for anxiety 5/6/2024 at pm    losartan (COZAAR) 50 MG tablet Take 50 mg by mouth daily 5/6/2024 at am    metoprolol tartrate (LOPRESSOR) 50 MG tablet [METOPROLOL TARTRATE (LOPRESSOR) 50 MG TABLET] Take 2 tablets (100 mg total) by mouth 2 (two) times a day. 5/7/2024 at 0810    mometasone (NASONEX) 50 MCG/ACT nasal spray Spray 2 sprays into both nostrils daily as needed (rhinitis) Past Month at unknown    NOVOLOG FLEXPEN 100 UNIT/ML soln Inject 8-15 Units Subcutaneous 3 times daily (with meals) plus sliding scale  151-175- 1 unit  176-200- 2 units  201-225- 3 units  226-250- 4  units  251-275- 5 units   276-300- 6 units  301-325- 7 units  326-350- 8 units  351-375- 9 units 5/6/2024 at pm    omeprazole (PRILOSEC) 20 MG capsule [OMEPRAZOLE (PRILOSEC) 20 MG CAPSULE] Take 1 capsule (20 mg total) by mouth daily before breakfast. 5/7/2024 at 0810    rosuvastatin (CRESTOR) 40 MG tablet Take 1 tablet (40 mg) by mouth daily 5/6/2024 at pm    TRESIBA FLEXTOUCH 100 UNIT/ML pen Inject 54 Units Subcutaneous daily 5/5/2024 at pm    WIXELA INHUB 250-50 MCG/ACT inhaler Inhale 1 puff into the lungs 2 times daily 5/6/2024 at pm

## 2024-05-07 NOTE — ANESTHESIA CARE TRANSFER NOTE
Patient: Mariama Keene    Procedure: Procedure(s):  MODIFIED RADICAL VULVECTOMY,  BILATERAL SENTINEL LYMPH NODE INJECTION AND BIOPSY       Diagnosis: Squamous cell carcinoma of vulva (H) [C51.9]  Diagnosis Additional Information: No value filed.    Anesthesia Type:   General     Note:    Oropharynx: oropharynx clear of all foreign objects  Level of Consciousness: awake  Oxygen Supplementation: face mask    Independent Airway: airway patency satisfactory and stable  Dentition: dentition unchanged  Vital Signs Stable: post-procedure vital signs reviewed and stable  Report to RN Given: handoff report given  Patient transferred to: PACU    Handoff Report: Identifed the Patient, Identified the Reponsible Provider, Reviewed the pertinent medical history, Discussed the surgical course, Reviewed Intra-OP anesthesia mangement and issues during anesthesia, Set expectations for post-procedure period and Allowed opportunity for questions and acknowledgement of understanding      Vitals:  Vitals Value Taken Time   /68 5/7/2024 1701   Temp 97.4 F    Pulse 63    Resp 12    SpO2 95%        Electronically Signed By: MARY Sotelo CRNA  May 7, 2024  4:59 PM   Gabapentin Counseling: I discussed with the patient the risks of gabapentin including but not limited to dizziness, somnolence, fatigue and ataxia.

## 2024-05-07 NOTE — ANESTHESIA POSTPROCEDURE EVALUATION
Patient: Mariama Keene    Procedure: Procedure(s):  MODIFIED RADICAL VULVECTOMY,  BILATERAL SENTINEL LYMPH NODE INJECTION AND BIOPSY       Anesthesia Type:  General    Note:  Disposition: Inpatient   Postop Pain Control: Uneventful            Sign Out: Well controlled pain   PONV: No   Neuro/Psych: Uneventful            Sign Out: Acceptable/Baseline neuro status   Airway/Respiratory: Uneventful            Sign Out: Acceptable/Baseline resp. status   CV/Hemodynamics: Uneventful            Sign Out: Acceptable CV status; No obvious hypovolemia; No obvious fluid overload   Other NRE:    DID A NON-ROUTINE EVENT OCCUR?        Last vitals:  Vitals Value Taken Time   /74 05/07/24 1700   Temp 36.9  C (98.4  F) 05/07/24 1657   Pulse 63 05/07/24 1715   Resp 17 05/07/24 1715   SpO2 97 % 05/07/24 1715   Vitals shown include unfiled device data.    Electronically Signed By: Jhonatan Evans MD  May 7, 2024  5:17 PM

## 2024-05-08 ENCOUNTER — APPOINTMENT (OUTPATIENT)
Dept: PHYSICAL THERAPY | Facility: HOSPITAL | Age: 69
DRG: 734 | End: 2024-05-08
Attending: PHYSICIAN ASSISTANT
Payer: COMMERCIAL

## 2024-05-08 ENCOUNTER — APPOINTMENT (OUTPATIENT)
Dept: RADIOLOGY | Facility: HOSPITAL | Age: 69
DRG: 734 | End: 2024-05-08
Attending: STUDENT IN AN ORGANIZED HEALTH CARE EDUCATION/TRAINING PROGRAM
Payer: COMMERCIAL

## 2024-05-08 LAB
ANION GAP SERPL CALCULATED.3IONS-SCNC: 15 MMOL/L (ref 7–15)
BUN SERPL-MCNC: 61.8 MG/DL (ref 8–23)
C PNEUM DNA SPEC QL NAA+PROBE: NOT DETECTED
CALCIUM SERPL-MCNC: 8.5 MG/DL (ref 8.8–10.2)
CHLORIDE SERPL-SCNC: 99 MMOL/L (ref 98–107)
CREAT SERPL-MCNC: 2.94 MG/DL (ref 0.51–0.95)
DEPRECATED HCO3 PLAS-SCNC: 23 MMOL/L (ref 22–29)
EGFRCR SERPLBLD CKD-EPI 2021: 17 ML/MIN/1.73M2
ERYTHROCYTE [DISTWIDTH] IN BLOOD BY AUTOMATED COUNT: 12.2 % (ref 10–15)
FLUAV H1 2009 PAND RNA SPEC QL NAA+PROBE: NOT DETECTED
FLUAV H1 RNA SPEC QL NAA+PROBE: NOT DETECTED
FLUAV H3 RNA SPEC QL NAA+PROBE: NOT DETECTED
FLUAV RNA SPEC QL NAA+PROBE: NOT DETECTED
FLUBV RNA SPEC QL NAA+PROBE: NOT DETECTED
GLUCOSE BLDC GLUCOMTR-MCNC: 132 MG/DL (ref 70–99)
GLUCOSE BLDC GLUCOMTR-MCNC: 181 MG/DL (ref 70–99)
GLUCOSE BLDC GLUCOMTR-MCNC: 186 MG/DL (ref 70–99)
GLUCOSE BLDC GLUCOMTR-MCNC: 219 MG/DL (ref 70–99)
GLUCOSE BLDC GLUCOMTR-MCNC: 236 MG/DL (ref 70–99)
GLUCOSE BLDC GLUCOMTR-MCNC: 248 MG/DL (ref 70–99)
GLUCOSE SERPL-MCNC: 205 MG/DL (ref 70–99)
HADV DNA SPEC QL NAA+PROBE: NOT DETECTED
HBA1C MFR BLD: 7.1 %
HCOV PNL SPEC NAA+PROBE: NOT DETECTED
HCT VFR BLD AUTO: 32.7 % (ref 35–47)
HGB BLD-MCNC: 10.8 G/DL (ref 11.7–15.7)
HMPV RNA SPEC QL NAA+PROBE: NOT DETECTED
HPIV1 RNA SPEC QL NAA+PROBE: NOT DETECTED
HPIV2 RNA SPEC QL NAA+PROBE: NOT DETECTED
HPIV3 RNA SPEC QL NAA+PROBE: NOT DETECTED
HPIV4 RNA SPEC QL NAA+PROBE: NOT DETECTED
IRON BINDING CAPACITY (ROCHE): 219 UG/DL (ref 240–430)
IRON SATN MFR SERPL: 40 % (ref 15–46)
IRON SERPL-MCNC: 87 UG/DL (ref 37–145)
M PNEUMO DNA SPEC QL NAA+PROBE: NOT DETECTED
MAGNESIUM SERPL-MCNC: 2.1 MG/DL (ref 1.7–2.3)
MCH RBC QN AUTO: 30 PG (ref 26.5–33)
MCHC RBC AUTO-ENTMCNC: 33 G/DL (ref 31.5–36.5)
MCV RBC AUTO: 91 FL (ref 78–100)
NT-PROBNP SERPL-MCNC: 818 PG/ML (ref 0–900)
PHOSPHATE SERPL-MCNC: 6.8 MG/DL (ref 2.5–4.5)
PLATELET # BLD AUTO: 158 10E3/UL (ref 150–450)
POTASSIUM SERPL-SCNC: 4.4 MMOL/L (ref 3.4–5.3)
PROCALCITONIN SERPL IA-MCNC: 0.12 NG/ML
RBC # BLD AUTO: 3.6 10E6/UL (ref 3.8–5.2)
RSV RNA SPEC QL NAA+PROBE: NOT DETECTED
RSV RNA SPEC QL NAA+PROBE: NOT DETECTED
RV+EV RNA SPEC QL NAA+PROBE: NOT DETECTED
SODIUM SERPL-SCNC: 137 MMOL/L (ref 135–145)
WBC # BLD AUTO: 9.5 10E3/UL (ref 4–11)

## 2024-05-08 PROCEDURE — 83550 IRON BINDING TEST: CPT | Performed by: STUDENT IN AN ORGANIZED HEALTH CARE EDUCATION/TRAINING PROGRAM

## 2024-05-08 PROCEDURE — 97161 PT EVAL LOW COMPLEX 20 MIN: CPT | Mod: GP

## 2024-05-08 PROCEDURE — 84145 PROCALCITONIN (PCT): CPT | Performed by: STUDENT IN AN ORGANIZED HEALTH CARE EDUCATION/TRAINING PROGRAM

## 2024-05-08 PROCEDURE — 36415 COLL VENOUS BLD VENIPUNCTURE: CPT | Performed by: STUDENT IN AN ORGANIZED HEALTH CARE EDUCATION/TRAINING PROGRAM

## 2024-05-08 PROCEDURE — 250N000013 HC RX MED GY IP 250 OP 250 PS 637: Performed by: STUDENT IN AN ORGANIZED HEALTH CARE EDUCATION/TRAINING PROGRAM

## 2024-05-08 PROCEDURE — 87581 M.PNEUMON DNA AMP PROBE: CPT | Performed by: STUDENT IN AN ORGANIZED HEALTH CARE EDUCATION/TRAINING PROGRAM

## 2024-05-08 PROCEDURE — 120N000001 HC R&B MED SURG/OB

## 2024-05-08 PROCEDURE — 250N000009 HC RX 250: Performed by: PHYSICIAN ASSISTANT

## 2024-05-08 PROCEDURE — 83880 ASSAY OF NATRIURETIC PEPTIDE: CPT | Performed by: STUDENT IN AN ORGANIZED HEALTH CARE EDUCATION/TRAINING PROGRAM

## 2024-05-08 PROCEDURE — 99233 SBSQ HOSP IP/OBS HIGH 50: CPT | Performed by: STUDENT IN AN ORGANIZED HEALTH CARE EDUCATION/TRAINING PROGRAM

## 2024-05-08 PROCEDURE — 250N000011 HC RX IP 250 OP 636: Performed by: PHYSICIAN ASSISTANT

## 2024-05-08 PROCEDURE — 250N000013 HC RX MED GY IP 250 OP 250 PS 637: Performed by: PHYSICIAN ASSISTANT

## 2024-05-08 PROCEDURE — 250N000012 HC RX MED GY IP 250 OP 636 PS 637: Performed by: PHYSICIAN ASSISTANT

## 2024-05-08 PROCEDURE — 80048 BASIC METABOLIC PNL TOTAL CA: CPT | Performed by: STUDENT IN AN ORGANIZED HEALTH CARE EDUCATION/TRAINING PROGRAM

## 2024-05-08 PROCEDURE — 97110 THERAPEUTIC EXERCISES: CPT | Mod: GP

## 2024-05-08 PROCEDURE — 84100 ASSAY OF PHOSPHORUS: CPT | Performed by: STUDENT IN AN ORGANIZED HEALTH CARE EDUCATION/TRAINING PROGRAM

## 2024-05-08 PROCEDURE — 83036 HEMOGLOBIN GLYCOSYLATED A1C: CPT | Performed by: STUDENT IN AN ORGANIZED HEALTH CARE EDUCATION/TRAINING PROGRAM

## 2024-05-08 PROCEDURE — 83735 ASSAY OF MAGNESIUM: CPT | Performed by: STUDENT IN AN ORGANIZED HEALTH CARE EDUCATION/TRAINING PROGRAM

## 2024-05-08 PROCEDURE — 82728 ASSAY OF FERRITIN: CPT | Performed by: STUDENT IN AN ORGANIZED HEALTH CARE EDUCATION/TRAINING PROGRAM

## 2024-05-08 PROCEDURE — 87633 RESP VIRUS 12-25 TARGETS: CPT | Performed by: STUDENT IN AN ORGANIZED HEALTH CARE EDUCATION/TRAINING PROGRAM

## 2024-05-08 PROCEDURE — 85027 COMPLETE CBC AUTOMATED: CPT | Performed by: STUDENT IN AN ORGANIZED HEALTH CARE EDUCATION/TRAINING PROGRAM

## 2024-05-08 PROCEDURE — 71045 X-RAY EXAM CHEST 1 VIEW: CPT

## 2024-05-08 RX ORDER — ATORVASTATIN CALCIUM 40 MG/1
80 TABLET, FILM COATED ORAL EVERY EVENING
Status: DISCONTINUED | OUTPATIENT
Start: 2024-05-08 | End: 2024-05-12 | Stop reason: HOSPADM

## 2024-05-08 RX ORDER — SILVER SULFADIAZINE 10 MG/G
CREAM TOPICAL 2 TIMES DAILY
Status: DISCONTINUED | OUTPATIENT
Start: 2024-05-08 | End: 2024-05-12 | Stop reason: HOSPADM

## 2024-05-08 RX ORDER — ENOXAPARIN SODIUM 100 MG/ML
30 INJECTION SUBCUTANEOUS EVERY 24 HOURS
Status: DISCONTINUED | OUTPATIENT
Start: 2024-05-08 | End: 2024-05-10

## 2024-05-08 RX ORDER — SILVER SULFADIAZINE 10 MG/G
CREAM TOPICAL 2 TIMES DAILY
Status: DISCONTINUED | OUTPATIENT
Start: 2024-05-08 | End: 2024-05-08

## 2024-05-08 RX ORDER — POLYETHYLENE GLYCOL 3350 17 G/17G
17 POWDER, FOR SOLUTION ORAL DAILY PRN
Status: DISCONTINUED | OUTPATIENT
Start: 2024-05-08 | End: 2024-05-12 | Stop reason: HOSPADM

## 2024-05-08 RX ORDER — GUAIFENESIN 200 MG/10ML
200 LIQUID ORAL EVERY 4 HOURS PRN
Status: DISCONTINUED | OUTPATIENT
Start: 2024-05-08 | End: 2024-05-12 | Stop reason: HOSPADM

## 2024-05-08 RX ADMIN — ATORVASTATIN CALCIUM 80 MG: 40 TABLET, FILM COATED ORAL at 20:32

## 2024-05-08 RX ADMIN — INSULIN DEGLUDEC 30 UNITS: 100 INJECTION, SOLUTION SUBCUTANEOUS at 21:54

## 2024-05-08 RX ADMIN — LOSARTAN POTASSIUM 50 MG: 50 TABLET, FILM COATED ORAL at 08:30

## 2024-05-08 RX ADMIN — ISOSORBIDE MONONITRATE 30 MG: 30 TABLET, EXTENDED RELEASE ORAL at 20:32

## 2024-05-08 RX ADMIN — ENOXAPARIN SODIUM 30 MG: 30 INJECTION SUBCUTANEOUS at 16:33

## 2024-05-08 RX ADMIN — METOPROLOL TARTRATE 100 MG: 25 TABLET, FILM COATED ORAL at 20:32

## 2024-05-08 RX ADMIN — INSULIN ASPART 2 UNITS: 100 INJECTION, SOLUTION INTRAVENOUS; SUBCUTANEOUS at 13:21

## 2024-05-08 RX ADMIN — ACETAMINOPHEN 650 MG: 325 TABLET ORAL at 21:53

## 2024-05-08 RX ADMIN — INSULIN ASPART 1 UNITS: 100 INJECTION, SOLUTION INTRAVENOUS; SUBCUTANEOUS at 08:42

## 2024-05-08 RX ADMIN — METOPROLOL TARTRATE 100 MG: 25 TABLET, FILM COATED ORAL at 08:30

## 2024-05-08 RX ADMIN — POLYETHYLENE GLYCOL 3350 17 G: 17 POWDER, FOR SOLUTION ORAL at 14:34

## 2024-05-08 RX ADMIN — PANTOPRAZOLE SODIUM 40 MG: 40 TABLET, DELAYED RELEASE ORAL at 08:31

## 2024-05-08 RX ADMIN — SILVER SULFADIAZINE: 10 CREAM TOPICAL at 21:56

## 2024-05-08 RX ADMIN — SILVER SULFADIAZINE: 10 CREAM TOPICAL at 14:34

## 2024-05-08 RX ADMIN — AMLODIPINE BESYLATE 5 MG: 5 TABLET ORAL at 08:31

## 2024-05-08 RX ADMIN — LEVOTHYROXINE SODIUM 100 MCG: 100 TABLET ORAL at 08:31

## 2024-05-08 RX ADMIN — ACETAMINOPHEN 650 MG: 325 TABLET ORAL at 13:18

## 2024-05-08 RX ADMIN — FLUTICASONE FUROATE AND VILANTEROL TRIFENATATE 1 PUFF: 100; 25 POWDER RESPIRATORY (INHALATION) at 20:34

## 2024-05-08 RX ADMIN — FUROSEMIDE 80 MG: 20 TABLET ORAL at 08:30

## 2024-05-08 RX ADMIN — FUROSEMIDE 40 MG: 20 TABLET ORAL at 16:33

## 2024-05-08 ASSESSMENT — ACTIVITIES OF DAILY LIVING (ADL)
ADLS_ACUITY_SCORE: 22
ADLS_ACUITY_SCORE: 20
ADLS_ACUITY_SCORE: 22

## 2024-05-08 NOTE — PROGRESS NOTES
"Care Management Initial Consult    General Information  Assessment completed with:  ,  patient       Primary Care Provider verified and updated as needed:   yes  Readmission within the last 30 days:   no        Advance Care Planning:     no documents       Communication Assessment  Patient's communication style: spoken language (English or Bilingual)    Hearing Difficulty or Deaf: no   Wear Glasses or Blind: yes    Cognitive  Cognitive/Neuro/Behavioral: WDL  Level of Consciousness: alert  Arousal Level: opens eyes spontaneously  Orientation: oriented x 4             Living Environment:   People in home: alone     Current living Arrangements:  ground level apartment      Able to return to prior arrangements:  yes       Family/Social Support:  Care provided by: self, child(diego)  Provides care for:                  Description of Support System:   supportive 2 sons, sister and friends        Current Resources:   Patient receiving home care services:  no     Community Resources:    Equipment currently used at home: none  Supplies currently used at home:      Employment/Financial:  Employment Status:   not working       Financial Concerns:  none identified           Does the patient's insurance plan have a 3 day qualifying hospital stay waiver?  No    Lifestyle & Psychosocial Needs:  Social Determinants of Health     Food Insecurity: Not on file   Depression: Not on file   Housing Stability: Not on file   Tobacco Use: Medium Risk (5/7/2024)    Patient History     Smoking Tobacco Use: Former     Smokeless Tobacco Use: Never     Passive Exposure: Not on file   Financial Resource Strain: Not on file   Alcohol Use: Not on file   Transportation Needs: Not on file   Physical Activity: Not on file   Interpersonal Safety: Not on file   Stress: Not on file   Social Connections: Not on file   Health Literacy: Not on file       Functional Status:  Prior to admission patient needed assistance: independent , says she has \"floppy " "foot\" from neuropathy             Mental Health Status: no concerns          Chemical Dependency Status: no concerns                Values/Beliefs:  Spiritual, Cultural Beliefs, Yarsani Practices, Values that affect care:     Hoahaoism            Additional Information:  Patient hopes to stay hospitalized one more day as she isn't feeling very well. She is alert,oriented and independent at baseline. She expects to discharge to home with family transport. She is on O2 and does not use supplemental O2 at home.     SAIMA Valle     "

## 2024-05-08 NOTE — PLAN OF CARE
Problem: Adult Inpatient Plan of Care  Goal: Optimal Comfort and Wellbeing  Outcome: Progressing     Problem: Risk for Delirium  Goal: Improved Sleep  Outcome: Progressing   Goal Outcome Evaluation:       Patient states pain at surgical sites in groin area following voiding today has been at a 3/10, and patient states it is tolerable. PRN Tylenol was given at 1315 bringing pain down to 1. Patient has had 2 blood sugar checks today, this morning being 181 and receiving 1 unit of insulin. Second check was 236 receiving 2 units of insulin (see MAR). Patient is weaning down off oxygen starting at 3L this morning now down to 1.5L nasal cannula and on continuous capnography. Patient ambulated hallway with physical therapy this morning. Upon assessment crackles were noted in left lower lobe and chest CT was done, results were negative. Patient has been using IS with encouragement and reports coughing with use. Silvadene cream was ordered and applied to incisions in groin area at 1445. Patient has been napping comfortably throughout the shift.    Aubrie Crowder RN

## 2024-05-08 NOTE — CONSULTS
[facility]  Consult Note - Hospitalist Service  Date of Admission:  5/7/2024  Consult Requested by: Gynecological oncology  Reason for Consult: Medical comanagement    Assessment & Plan   Assessment:  Mariama Keene is a 68 year old female admitted on 5/7/2024. She presented in early March with vaginal irritation and soreness.  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  HPV negative.  Patient presented today for modified radical valvulectomy with sentinel lymph node injection and bilateral inguinal lymph node dissection and is s/p procedure, medicine was consulted for medical comanagement.    Problem list and plan:  Squamous cell carcinoma of the vulva  Presented for planned modified radical valvulectomy, sentinel lymph node injection and bilateral inguinal lymph node dissection and is s/p procedure  Follow-up gynecological oncology for further recommendations  Pain management as per protocol    Bradycardia possibly in setting of sleep apnea  Seen to be bradycardic while sleeping but asymptomatic  Will order cardiac telemetry monitoring  Also seem to be desaturating on room air while sleeping and currently on 3 L nasal cannula saturating in the mid to high 90s    History of hypertension  Monitor vital signs as per protocol  Continue with amlodipine, losartan, metoprolol, Imdur    History of hyperlipidemia  Continue with rosuvastatin    History of CHF not in exacerbation  Continue with Lasix 80 in a.m. and 40 in p.m.  Monitor volume status closely  Continue with Imdur    History of hypothyroidism  Continue with levothyroxine    History of insulin-dependent type 2 diabetes mellitus  Currently on Lantus 54 units  Sugars running in the lower range  Decrease the dose of Lantus from 54 units to 30 units  May switch back to  "home dose of Lantus on discharge if sugars start to trend up  Suspecting sugars would be higher at home and currently in the low range due to moderate carb diet.  Continue with sliding scale  Follow-up hemoglobin A1c  Monitor blood sugar levels closely  Hypoglycemia protocol  On moderate carb diet    History of mood disorder/anxiety/panic attacks  As needed Ativan, hydroxyzine    History of asthma not in exacerbation  Continue with bronchodilators as needed and appropriate    DVT PPx  Intermittent pneumatic compression     Clinically Significant Risk Factors Present on Admission                # Drug Induced Platelet Defect: home medication list includes an antiplatelet medication   # Hypertension: Noted on problem list      # Obesity: Estimated body mass index is 34.68 kg/m  as calculated from the following:    Height as of 4/18/24: 1.6 m (5' 3\").    Weight as of this encounter: 88.8 kg (195 lb 12.8 oz).              Saad J. Gondal, MD  Hospitalist Service  Securely message with Vocera (more info)  Text page via [facility] Paging/Directory   ______________________________________________________________________    Chief Complaint   Vaginal irritation and soreness    History is obtained from the patient and chart review    History of Present Illness   Mariama Keene is a 68 year old female admitted on 5/7/2024. She presented in early March with vaginal irritation and soreness.  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  HPV negative.  Patient presented today for modified radical valvulectomy with sentinel lymph node injection and bilateral inguinal lymph node dissection and is s/p procedure, medicine was consulted for medical comanagement.      Past Medical History    Past Medical History:   Diagnosis Date    Asthma     Asthma     Asthma     " Congestive heart failure (H)     Diabetes (H)     Diabetes (H)     Diabetes (H)     Hypertension     Hypertension     Hypertension     Panic attacks     Panic attacks     Panic attacks        Past Surgical History   Past Surgical History:   Procedure Laterality Date    ABSCESS DRAINAGE      On back    APPENDECTOMY      CHOLECYSTECTOMY      CV CORONARY ANGIOGRAM N/A 04/18/2024    Procedure: Coronary Angiogram;  Surgeon: Casa Vasquez MD;  Location: [facility] CV    CV LEFT HEART CATH N/A 04/18/2024    Procedure: Left Heart Catheterization;  Surgeon: Casa Vasquez MD;  Location: [facility] CV    HYSTERECTOMY      age 33 - partial    NEPHRECTOMY PARTIAL Right     OOPHORECTOMY      age 48       Medications   I have reviewed this patient's current medications       Review of Systems    The 10 point Review of Systems is negative other than noted in the HPI or here.  Vaginal irritation and soreness    Physical Exam   Vital Signs: Temp: 97.4  F (36.3  C) Temp src: Oral BP: 130/60 Pulse: 51   Resp: 16 SpO2: 95 % O2 Device: Nasal cannula Oxygen Delivery: 3 LPM  Weight: 195 lbs 12.8 oz    GENERAL: Patient was seen and examined at bedside with no acute distress  HENT:  Head is normocephalic, atraumatic.   EYES:  Eyes have anicteric sclerae without conjunctival injection   LUNGS: Bilateral air entry, clear to auscultation bilaterally  CARDIOVASCULAR:  Regular rate and rhythm with no murmurs, gallops or rubs.  ABDOMEN:  Normal bowel sounds. Soft; nontender   EXT: no edema    SKIN:  No acute rashes.   NEUROLOGIC:  Grossly nonfocal.      Medical Decision Making       80 MINUTES SPENT BY ME on the date of service doing chart review, history, exam, documentation & further activities per the note.      Data         Imaging results reviewed over the past 24 hrs:   No results found for this or any previous visit (from the past 24 hour(s)).

## 2024-05-08 NOTE — PROGRESS NOTES
GYN/ONC PROGRESS NOTE    cc: POD# 1 s/p Modified radical vulvectomy, [place] lymph node injection and bilateral inguinal lymph node dissection    HPI: Pt doing well. Minimal pain. Scant amount of pink drainage from vulvar incision. Ambulating to bathroom. Voiding without difficulty. Tolerating diet. No flatus yet but feels like she needs to have BM. Remains on 3L O2, unable to wean off last night. Bradycardia overnight. Reports increased coughing and difficulty taking deep breaths with IS use. Denies chest pain. HX of asthma. Past smoker.     EXAM:    Intake/Output Summary (Last 24 hours) at 5/8/2024 0823  Last data filed at 5/8/2024 0622  Gross per 24 hour   Intake 1340 ml   Output 400 ml   Net 940 ml       U/O: 350cc since midnight  Drains: N/A    /57 (BP Location: Right arm)   Pulse 66   Temp 98  F (36.7  C) (Oral)   Resp 16   Wt 88.8 kg (195 lb 12.8 oz)   SpO2 96%   BMI 34.68 kg/m      GENERAL: alert, NAD, obese  CV: RRR, no murmurs  RESPIRATORY: no dyspnea, CTAB  ABDOMEN: soft, non-tender  GROIN: bilateral incisions c/d/I with surrounding ecchymosis, no edema  : limited exam, no drainage on pad, incision c/d/i  EXTREMITY: no edema, SCD's in place  SKIN: warm and dry, no jaundice, no rashes  NEURO: cranial nerves II-XII grossly intact, motor exam intact    LABS  Recent Labs   Lab Test 05/08/24  0539 04/18/24  0755   WBC 9.5 6.6   RBC 3.60* 4.18   HGB 10.8* 12.7   HCT 32.7* 37.3   MCV 91 89   MCH 30.0 30.4   MCHC 33.0 34.0    189       Recent Labs   Lab Test 05/08/24  0539 04/18/24  0755   POTASSIUM 4.4 4.5   CHLORIDE 99 100   BUN 61.8* 59.9*       Recent Labs   Lab Test 11/13/23  1140 11/29/22  0640 08/16/20 2017 08/16/20  1514   PROTEIN  --   --   --  200 mg/dL*   BILITOTAL 0.7 0.5   < >  --    AST 19 26   < >  --    ALT 14 18   < >  --     < > = values in this interval not displayed.       IMAGING  N/A      ASSESSMENT  Mariama Keene is a 68-year-old female recently diagnosed with  invasive, well-differentiated squamous cell carcinoma of the vulva. She is s/p modified radical vulvectomy with SNL injection and bilateral inguinal lymph node dissection on 5/7/24.    PLAN:  Routine post-op cares, overall pt doing well. Reviewed procedure and operative findings with patient. May apply Silvadene cream to vulvar incision BID.   Hospitalist consulted for management of co morbidities, appreciate input.   Oncology: Final pathology pending.  Pain: Continue PRN Tylenol and Oxycodone. IV Dilaudid available prn. Limit narcotics as able. Avoid NSAIDS due to hx of CKD. Ice packs PRN.   PULM: Hypoxia overnight, CXR ordered, wean off O2 as able, possible sleep apnea contributing. Hx of asthma. Past smoker. Continue frequent IS.   CARD: sinus bradycardia overnight, currently on tele monitoring. Now improved.  GI: continue low carb diet.  : Voiding, reviewed use of sparkle-bottle after voiding to help keep incision clean.  FEN: IVF discontinued. PIV SL.  DVT prophylaxis: SCD's while in bed. Lovenox 40mg subcutaneous daily while inpatient. Ambulation encouraged 4x/daily.  ID:  Afebrile. Monitor. May need antibiotics post-operatively, will review prior to discharge.  Heme: Hgb today 10.8.  Asymptomatic. VSS.  PT consult to assess for any home needs.      Dispo: pending progress and ability to wean off O2; D/C once tolerating diet advancement, ambulating and pain adequately managed with oral pain medications. Plan for discharge tomorrow as patient does not have transportation available today.         Olya Cuevas PA-C  [facility]  Gyn Oncology  [phone]           Yes

## 2024-05-08 NOTE — PLAN OF CARE
Problem: Adult Inpatient Plan of Care  Goal: Plan of Care Review  Description: The Plan of Care Review/Shift note should be completed every shift.  The Outcome Evaluation is a brief statement about your assessment that the patient is improving, declining, or no change.  This information will be displayed automatically on your shift  note.  Outcome: Progressing     Problem: Adult Inpatient Plan of Care  Goal: Optimal Comfort and Wellbeing  Intervention: Monitor Pain and Promote Comfort  Recent Flowsheet Documentation  Taken 5/7/2024 2020 by Dalrene Linares, RN  Pain Management Interventions: medication (see MAR)     Problem: Adult Inpatient Plan of Care  Goal: Absence of Hospital-Acquired Illness or Injury  Intervention: Prevent and Manage VTE (Venous Thromboembolism) Risk  Recent Flowsheet Documentation  Taken 5/7/2024 1906 by Darlene Linares, RN  VTE Prevention/Management: SCDs (sequential compression devices) on     Patient arrived to unit this evening. Alert and oriented. States she had some pain, prn oxy given, denies pain at this time now. Walked to bathroom with assist of 1. IV saline locked. Scant amount of pink/red drainage to vulva/groin area, gauze in place with mesh underwear. Patient refused ice to area. Pulse fluctuating high 40's, low 50's, while patient sleeps, as well as O2 when attempting to wean off, so she remains on 3L with sats in the high 90's. BG checks 123 and 138. Takes meds well. Appears comfortable sleeping at this time.

## 2024-05-08 NOTE — PROGRESS NOTES
[facility]    Medicine Progress Note - Hospitalist Service    Date of Admission:  5/7/2024    Assessment & Plan   Mariama Keene is a 68 year old female admitted on 5/7/2024. She presented in early March with vaginal irritation and soreness.  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  HPV negative.  Patient presented today for modified radical valvulectomy with sentinel lymph node injection and bilateral inguinal lymph node dissection and is s/p procedure, medicine was consulted for medical comanagement.     Squamous cell carcinoma of the vulva  Presented for planned modified radical valvulectomy, sentinel lymph node injection and bilateral inguinal lymph node dissection and is s/p procedure  Follow-up gynecological oncology for further recommendations  Pain management as per protocol     ? Chronic Hypoxia, suspect chronic underlying JAMAR/OHS  Will wean off oxygen as able  Vitals stable, low suspicion of PE  Patient now off oxygen, saturating 92-93% on RA  Will monitor    URI  C/o cough, sneezing and mild discomfort with deep breathing  No wheezing. Denies chest pain/ discomfort  Procal and BNP not elevated  CXR negative  Viral panel pending  Robitussin prn    CKD  Cr 2.94, baseline appears to be 2.65  Will hold Losartan  Monitor Cr    Normocytic anemia  Hb 10.8, likely 2/2 CKD  Iron studies    CAD  Cath with moderate disease in LAD and Lcx, severe disease in small RCA; mildly elevated L-sided filling pressures   aspirin 81 mg, beta-blocker, atorvastatin, Imdur  Follow up outpatient with Cardiology    History of hypertension  Monitor vital signs as per protocol  Continue with amlodipine, metoprolol, Imdur  Losartan on hold     History of hyperlipidemia  Changed Rosuvastatin to Atorvastatin 80mg due to CrCl     History of  "CHF not in exacerbation  Continue with Lasix 80 in a.m. and 40 in p.m.  Monitor volume status closely  Continue with Imdur     History of hypothyroidism  Continue with levothyroxine     History of insulin-dependent type 2 diabetes mellitus  HbA1c 7.1  on Lantus 54 units at home  Blood sugars running in the lower range, likely due to moderate carb diet in the hospital  Patient took 15 units of NovoLog from her home insulin, RN notified  Will monitor blood sugars closely  Increase Lantus to 35 units, high sliding scale insulin  Will likely need home dose of Lantus on discharge     History of mood disorder/anxiety/panic attacks  As needed Ativan, hydroxyzine     History of asthma not in exacerbation  Continue with bronchodilators as needed and appropriate          Diet: Resume pre-procedure diet  Diet  Moderate Consistent Carb (60 g CHO per Meal) Diet    DVT Prophylaxis: Enoxaparin (Lovenox) SQ  Rao Catheter: Not present  Lines: None     Cardiac Monitoring: ACTIVE order. Indication: Bradycardias (48 hours)  Code Status: Full Code      Clinically Significant Risk Factors Present on Admission                # Drug Induced Platelet Defect: home medication list includes an antiplatelet medication   # Hypertension: Noted on problem list     # DMII: A1C = 7.1 % (Ref range: <5.7 %) within past 6 months   # Obesity: Estimated body mass index is 34.68 kg/m  as calculated from the following:    Height as of 4/18/24: 1.6 m (5' 3\").    Weight as of this encounter: 88.8 kg (195 lb 12.8 oz).              Disposition Plan     Medically Ready for Discharge: Anticipated Tomorrow             Liza Mariano MD  Hospitalist Service  [facility]  Securely message with [employer] (more info)  Text page via [employer] Paging/Directory   ______________________________________________________________________    Interval History   Patient was examined at the bedside, new to me today.  Was able to wean off oxygen.  Patient " medically ready for discharge, but has transportation problem.  Will be able to have a ride tomorrow morning, we will plan for discharge    Physical Exam   Vital Signs: Temp: 98  F (36.7  C) Temp src: Oral BP: 119/57 Pulse: 66   Resp: 16 SpO2: 96 % O2 Device: Nasal cannula Oxygen Delivery: 3 LPM  Weight: 195 lbs 12.8 oz    GENERAL: Patient was seen and examined at bedside with no acute distress, off supplemental oxygen  LUNGS: Bilateral air entry, clear to auscultation bilaterally  CARDIOVASCULAR:  Regular rate and rhythm with no murmurs, gallops or rubs.  ABDOMEN:  Normal bowel sounds. Soft; nontender   EXT: no edema    SKIN:  No acute rashes.   NEUROLOGIC:  Grossly nonfocal    Medical Decision Making       55 MINUTES SPENT BY ME on the date of service doing chart review, history, exam, documentation & further activities per the note.      Data     I have personally reviewed the following data over the past 24 hrs:    9.5  \   10.8 (L)   / 158     137 99 61.8 (H) /  248 (H)   4.4 23 2.94 (H) \     Trop: N/A BNP: 818     TSH: N/A T4: N/A A1C: 7.1 (H)     Procal: 0.12 CRP: N/A Lactic Acid: N/A         Imaging results reviewed over the past 24 hrs:   Recent Results (from the past 24 hour(s))   XR Chest 1 View    Narrative    EXAM: XR CHEST 1 VIEW  LOCATION: [facility]  DATE: 5/8/2024    INDICATION: hypoxia  COMPARISON: PET/CT 4/3/2024      Impression    IMPRESSION: Negative chest.

## 2024-05-08 NOTE — PROGRESS NOTES
05/08/24 1050   Appointment Info   Signing Clinician's Name / Credentials (PT) Danna Gonzales PT   Rehab Comments (PT) Patient in bed and returned to bed .Repotrs min pain.   Living Environment   People in Home alone   Current Living Arrangements apartment   Home Accessibility no concerns   Transportation Anticipated family or friend will provide   Self-Care   Usual Activity Tolerance good   Current Activity Tolerance good   Equipment Currently Used at Home none   Fall history within last six months no   Activity/Exercise/Self-Care Comment Patient independent with mobility and ADL.Drives   General Information   Onset of Illness/Injury or Date of Surgery 05/07/24   Referring Physician Lorelei Lyons   Patient/Family Therapy Goals Statement (PT) return home once feels better   Pertinent History of Current Problem (include personal factors and/or comorbidities that impact the POC) Admitted for resection of squamous cell Ca of vulva.Patient has a hx of HTN,CHF,DM,Mood disorder,anxiety,panic attacts   Existing Precautions/Restrictions oxygen therapy device and L/min  (1 l)   Weight-Bearing Status - LLE weight-bearing as tolerated   Weight-Bearing Status - RLE weight-bearing as tolerated   Cognition   Affect/Mental Status (Cognition) WFL   Orientation Status (Cognition) oriented x 4   Pain Assessment   Patient Currently in Pain Yes, see Vital Sign flowsheet   Range of Motion (ROM)   Range of Motion ROM is WFL   Strength (Manual Muscle Testing)   Strength (Manual Muscle Testing) strength is WFL   Strength Comments except decreased L ankle DF   Bed Mobility   Bed Mobility no deficits identified   Transfers   Transfers no deficits identified   Gait/Stairs (Locomotion)   Roy Level (Gait) verbal cues;contact guard;minimum assist (75% patient effort)   Assistive Device (Gait) other (see comments)  (none)   Distance in Feet (Gait) 200 feet   Pattern (Gait) step-through   Deviations/Abnormal Patterns (Gait)  base of support, wide   Maintains Weight-bearing Status (Gait) able to maintain   Comment, (Gait/Stairs) slightly unsteady with path deviation ,reaching for hallway rail at times.O 2 sats on 1 l at 94 % at rest ,89 % after walk with fast recovery   Clinical Impression   Criteria for Skilled Therapeutic Intervention Yes, treatment indicated   PT Diagnosis (PT) impaired functional mobility   Influenced by the following impairments sugery,pain ,inactivit,weakness   Functional limitations due to impairments balance ,gait,endurance,strength   Clinical Presentation (PT Evaluation Complexity) stable   Clinical Presentation Rationale patient preesnts as med diagnosed   Clinical Decision Making (Complexity) low complexity   Planned Therapy Interventions (PT) balance training;gait training;strengthening;progressive activity/exercise   Risk & Benefits of therapy have been explained evaluation/treatment results reviewed;patient   Clinical Impression Comments Patient is a 69 yo s/o vulvar surgery preesnts with slight gait instability and decreased activity tolerance from baseline.Appropriate for short term rehab to ensure safe ambulation for returning home as pt lives alone.   PT Total Evaluation Time   PT Eval, Low Complexity Minutes (09686) 15   Physical Therapy Goals   PT Frequency Daily   PT Predicted Duration/Target Date for Goal Attainment 05/15/24   PT Goals Transfers   PT: Bed Mobility Supervision/stand-by assist;Supine to/from sit;Goal Met   PT: Transfers Independent;Sit to/from stand;Goal Met   PT: Gait Supervision/stand-by assist;Greater than 200 feet   Therapeutic Procedure/Exercise   Ther. Procedure: strength, endurance, ROM, flexibillity Minutes (16057) 10   Symptoms Noted During/After Treatment fatigue;increased pain   Treatment Detail/Skilled Intervention standing maura L/E ex x10 reps.   PT Discharge Planning   PT Plan safety with amb with no O2 as able,high level bal ex   PT Discharge Recommendation (DC Rec) home  with assist   PT Rationale for DC Rec Moves well .was independent prior.Family and friends can assist as needed   PT Brief overview of current status SBA for mobility .Amb 200 feet with CGA/min assist.   PT Equipment Needed at Discharge   (none)

## 2024-05-08 NOTE — PLAN OF CARE
Goal Outcome Evaluation:  Alert and Oriented x4. Denies pain in this shift. Ambulated to the bathroom and around the room SBA. Minimal drainage on the surgical area, gauze and mesh underwear in  place.

## 2024-05-09 ENCOUNTER — APPOINTMENT (OUTPATIENT)
Dept: PHYSICAL THERAPY | Facility: HOSPITAL | Age: 69
DRG: 734 | End: 2024-05-09
Attending: OBSTETRICS & GYNECOLOGY
Payer: COMMERCIAL

## 2024-05-09 ENCOUNTER — APPOINTMENT (OUTPATIENT)
Dept: ULTRASOUND IMAGING | Facility: HOSPITAL | Age: 69
DRG: 734 | End: 2024-05-09
Attending: INTERNAL MEDICINE
Payer: COMMERCIAL

## 2024-05-09 ENCOUNTER — APPOINTMENT (OUTPATIENT)
Dept: CT IMAGING | Facility: HOSPITAL | Age: 69
DRG: 734 | End: 2024-05-09
Attending: STUDENT IN AN ORGANIZED HEALTH CARE EDUCATION/TRAINING PROGRAM
Payer: COMMERCIAL

## 2024-05-09 LAB
ALBUMIN MFR UR ELPH: 84.8 MG/DL
ALBUMIN UR-MCNC: 70 MG/DL
APPEARANCE UR: ABNORMAL
BACTERIA #/AREA URNS HPF: ABNORMAL /HPF
BILIRUB UR QL STRIP: NEGATIVE
COLOR UR AUTO: ABNORMAL
CREAT SERPL-MCNC: 4.02 MG/DL (ref 0.51–0.95)
CREAT UR-MCNC: 93 MG/DL
EGFRCR SERPLBLD CKD-EPI 2021: 11 ML/MIN/1.73M2
FERRITIN SERPL-MCNC: 103 NG/ML (ref 11–328)
GLUCOSE BLDC GLUCOMTR-MCNC: 154 MG/DL (ref 70–99)
GLUCOSE BLDC GLUCOMTR-MCNC: 166 MG/DL (ref 70–99)
GLUCOSE BLDC GLUCOMTR-MCNC: 170 MG/DL (ref 70–99)
GLUCOSE BLDC GLUCOMTR-MCNC: 170 MG/DL (ref 70–99)
GLUCOSE BLDC GLUCOMTR-MCNC: 242 MG/DL (ref 70–99)
GLUCOSE UR STRIP-MCNC: NEGATIVE MG/DL
HGB UR QL STRIP: ABNORMAL
HOLD SPECIMEN: NORMAL
KETONES UR STRIP-MCNC: NEGATIVE MG/DL
LEUKOCYTE ESTERASE UR QL STRIP: ABNORMAL
MUCOUS THREADS #/AREA URNS LPF: PRESENT /LPF
NITRATE UR QL: NEGATIVE
PH UR STRIP: 5.5 [PH] (ref 5–7)
PROT/CREAT 24H UR: 0.91 MG/MG CR (ref 0–0.2)
RBC URINE: 10 /HPF
SP GR UR STRIP: 1.01 (ref 1–1.03)
SQUAMOUS EPITHELIAL: 2 /HPF
UROBILINOGEN UR STRIP-MCNC: <2 MG/DL
WBC CLUMPS #/AREA URNS HPF: PRESENT /HPF
WBC URINE: 158 /HPF

## 2024-05-09 PROCEDURE — 120N000001 HC R&B MED SURG/OB

## 2024-05-09 PROCEDURE — 250N000013 HC RX MED GY IP 250 OP 250 PS 637: Performed by: STUDENT IN AN ORGANIZED HEALTH CARE EDUCATION/TRAINING PROGRAM

## 2024-05-09 PROCEDURE — 84156 ASSAY OF PROTEIN URINE: CPT | Performed by: INTERNAL MEDICINE

## 2024-05-09 PROCEDURE — 71250 CT THORAX DX C-: CPT

## 2024-05-09 PROCEDURE — 250N000011 HC RX IP 250 OP 636: Performed by: INTERNAL MEDICINE

## 2024-05-09 PROCEDURE — P9047 ALBUMIN (HUMAN), 25%, 50ML: HCPCS | Performed by: INTERNAL MEDICINE

## 2024-05-09 PROCEDURE — 99233 SBSQ HOSP IP/OBS HIGH 50: CPT | Performed by: STUDENT IN AN ORGANIZED HEALTH CARE EDUCATION/TRAINING PROGRAM

## 2024-05-09 PROCEDURE — 76770 US EXAM ABDO BACK WALL COMP: CPT

## 2024-05-09 PROCEDURE — 250N000013 HC RX MED GY IP 250 OP 250 PS 637: Performed by: PHYSICIAN ASSISTANT

## 2024-05-09 PROCEDURE — 81001 URINALYSIS AUTO W/SCOPE: CPT | Performed by: INTERNAL MEDICINE

## 2024-05-09 PROCEDURE — 82565 ASSAY OF CREATININE: CPT | Performed by: STUDENT IN AN ORGANIZED HEALTH CARE EDUCATION/TRAINING PROGRAM

## 2024-05-09 PROCEDURE — 97116 GAIT TRAINING THERAPY: CPT | Mod: GP

## 2024-05-09 PROCEDURE — 250N000011 HC RX IP 250 OP 636: Performed by: PHYSICIAN ASSISTANT

## 2024-05-09 PROCEDURE — 999N000248 HC STATISTIC IV INSERT WITH US BY RN

## 2024-05-09 PROCEDURE — 36415 COLL VENOUS BLD VENIPUNCTURE: CPT | Performed by: STUDENT IN AN ORGANIZED HEALTH CARE EDUCATION/TRAINING PROGRAM

## 2024-05-09 RX ORDER — SILVER SULFADIAZINE 10 MG/G
CREAM TOPICAL 2 TIMES DAILY
Qty: 50 G | Refills: 1 | Status: SHIPPED | OUTPATIENT
Start: 2024-05-09

## 2024-05-09 RX ORDER — CIPROFLOXACIN 500 MG/1
500 TABLET, FILM COATED ORAL EVERY 24 HOURS
Status: DISCONTINUED | OUTPATIENT
Start: 2024-05-09 | End: 2024-05-10

## 2024-05-09 RX ORDER — DOXYCYCLINE 100 MG/1
100 CAPSULE ORAL 2 TIMES DAILY
Qty: 14 CAPSULE | Refills: 0 | Status: SHIPPED | OUTPATIENT
Start: 2024-05-09 | End: 2024-05-21

## 2024-05-09 RX ORDER — ALBUMIN (HUMAN) 12.5 G/50ML
50 SOLUTION INTRAVENOUS ONCE
Status: DISCONTINUED | OUTPATIENT
Start: 2024-05-09 | End: 2024-05-10

## 2024-05-09 RX ORDER — ACETAMINOPHEN 325 MG/1
975 TABLET ORAL EVERY 6 HOURS
Status: DISCONTINUED | OUTPATIENT
Start: 2024-05-09 | End: 2024-05-12 | Stop reason: HOSPADM

## 2024-05-09 RX ADMIN — ENOXAPARIN SODIUM 30 MG: 30 INJECTION SUBCUTANEOUS at 15:44

## 2024-05-09 RX ADMIN — METOPROLOL TARTRATE 100 MG: 25 TABLET, FILM COATED ORAL at 20:10

## 2024-05-09 RX ADMIN — ALBUTEROL SULFATE 2 PUFF: 90 AEROSOL, METERED RESPIRATORY (INHALATION) at 21:20

## 2024-05-09 RX ADMIN — ACETAMINOPHEN 975 MG: 325 TABLET ORAL at 21:40

## 2024-05-09 RX ADMIN — FUROSEMIDE 80 MG: 20 TABLET ORAL at 08:29

## 2024-05-09 RX ADMIN — PANTOPRAZOLE SODIUM 40 MG: 40 TABLET, DELAYED RELEASE ORAL at 08:30

## 2024-05-09 RX ADMIN — INSULIN DEGLUDEC 30 UNITS: 100 INJECTION, SOLUTION SUBCUTANEOUS at 20:06

## 2024-05-09 RX ADMIN — AMLODIPINE BESYLATE 5 MG: 5 TABLET ORAL at 08:30

## 2024-05-09 RX ADMIN — ACETAMINOPHEN 650 MG: 325 TABLET ORAL at 04:54

## 2024-05-09 RX ADMIN — METOPROLOL TARTRATE 100 MG: 25 TABLET, FILM COATED ORAL at 08:30

## 2024-05-09 RX ADMIN — ACETAMINOPHEN 975 MG: 325 TABLET ORAL at 15:44

## 2024-05-09 RX ADMIN — ATORVASTATIN CALCIUM 80 MG: 40 TABLET, FILM COATED ORAL at 20:09

## 2024-05-09 RX ADMIN — OXYCODONE HYDROCHLORIDE 2.5 MG: 5 TABLET ORAL at 20:18

## 2024-05-09 RX ADMIN — ISOSORBIDE MONONITRATE 30 MG: 30 TABLET, EXTENDED RELEASE ORAL at 20:09

## 2024-05-09 RX ADMIN — FLUTICASONE FUROATE AND VILANTEROL TRIFENATATE 1 PUFF: 100; 25 POWDER RESPIRATORY (INHALATION) at 20:05

## 2024-05-09 RX ADMIN — SILVER SULFADIAZINE 1 APPLICATOR: 10 CREAM TOPICAL at 10:54

## 2024-05-09 RX ADMIN — SILVER SULFADIAZINE: 10 CREAM TOPICAL at 20:09

## 2024-05-09 RX ADMIN — LEVOTHYROXINE SODIUM 100 MCG: 100 TABLET ORAL at 08:30

## 2024-05-09 RX ADMIN — ACETAMINOPHEN 975 MG: 325 TABLET ORAL at 10:55

## 2024-05-09 RX ADMIN — CIPROFLOXACIN HYDROCHLORIDE 500 MG: 500 TABLET, FILM COATED ORAL at 17:06

## 2024-05-09 RX ADMIN — OXYCODONE HYDROCHLORIDE 2.5 MG: 5 TABLET ORAL at 10:55

## 2024-05-09 RX ADMIN — OXYCODONE HYDROCHLORIDE 2.5 MG: 5 TABLET ORAL at 04:54

## 2024-05-09 ASSESSMENT — ACTIVITIES OF DAILY LIVING (ADL)
ADLS_ACUITY_SCORE: 22

## 2024-05-09 NOTE — PLAN OF CARE
"Goal Outcome Evaluation:    Pt AO x4. On RA sating 92%. Denies pain. Pt administered home 15 units of Novolog insulin by herself. She stated \"the units we are giving her aren't controlling her sugars\". writer explained to pt the orders, Pt understood. Home meds were sent to pharmacy for storage.   "

## 2024-05-09 NOTE — PROGRESS NOTES
[facility]    Medicine Progress Note - Hospitalist Service    Date of Admission:  5/7/2024    Assessment & Plan   Mariama Keene is a 68 year old female admitted on 5/7/2024. She presented in early March with vaginal irritation and soreness.  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  HPV negative.  Patient presented today for modified radical valvulectomy with sentinel lymph node injection and bilateral inguinal lymph node dissection and is s/p procedure, medicine was consulted for medical comanagement.     Squamous cell carcinoma of the vulva  Presented for planned modified radical valvulectomy, sentinel lymph node injection and bilateral inguinal lymph node dissection and is s/p procedure  Follow-up gynecological oncology for further recommendations  Pain management as per protocol     ? Chronic Hypoxia, suspect chronic underlying JAMAR/OHS  Will wean off oxygen as able  Vitals stable, low suspicion of PE  Patient now off oxygen, saturating 92-93% on RA  CT Chest w/o contrast   Incentive Spirometer, Will monitor     URI  C/o cough, sneezing and mild discomfort with deep breathing  No wheezing. Denies chest pain/ discomfort  Procal and BNP not elevated, Viral panel negative  CXR negative  Robitussin prn     BRAXTON on CKD - pre renal vs ATN  Cr 4.02, baseline appears to be 2.65  UA turbid, proteinuria, + , leukocyte esterase with squamous cells - ? contaminant.  No casts on UA  UPCR  US Kidney no evidence of hydronephrosis, atrophy of superior half of right kidney  Seen by Nephrology, appreciate recommendations  Will hold Losartan, Lasix  Received IV Albumin 50gm  Monitor Cr    Suspected UTI  Hx recurrent UTI  UA turbid, proteinuria, + , leukocyte esterase with 2 squamous cells - ? contaminant.  No casts on  UA  Has stinging pain after voiding  Will start Cipro due to symptoms and multiple allergies  Urine culture sent     Normocytic anemia  Hb 10.8, likely 2/2 CKD  Iron studies consistent with ACD  Monitor Hb     CAD  Cath with moderate disease in LAD and Lcx, severe disease in small RCA; mildly elevated L-sided filling pressures   aspirin 81 mg, beta-blocker, atorvastatin, Imdur  Follow up outpatient with Cardiology     History of hypertension  Monitor vital signs as per protocol  Continue with amlodipine, metoprolol, Imdur  Losartan, Lasix on hold     History of hyperlipidemia  Changed Rosuvastatin to Atorvastatin 80mg due to CrCl     History of CHF not in exacerbation  Lasix held due to BRAXTON  Monitor volume status closely  Continue with Imdur     History of hypothyroidism  Continue with levothyroxine     History of insulin-dependent type 2 diabetes mellitus  HbA1c 7.1  on Lantus 54 units at home  Blood sugars running in the lower range, likely due to moderate carb diet in the hospital  Patient took 15 units of NovoLog from her home insulin, RN notified - 05/08  Will monitor blood sugars closely  Continue Tresiba 30 units, high sliding scale insulin (will adjust doses with BRAXTON)  Will likely need home dose of Lantus on discharge     History of mood disorder/anxiety/panic attacks  As needed Ativan, hydroxyzine     History of asthma not in exacerbation  Continue with bronchodilators as needed and appropriate          Diet: Resume pre-procedure diet  Diet  Moderate Consistent Carb (60 g CHO per Meal) Diet    DVT Prophylaxis: Enoxaparin (Lovenox) SQ  Rao Catheter: Not present  Lines: None     Cardiac Monitoring: ACTIVE order. Indication: Bradycardias (48 hours)  Code Status: Full Code      Clinically Significant Risk Factors                 # Acute Kidney Injury, unspecified: based on a >150% or 0.3 mg/dL increase in last creatinine compared to past 90 day average, will monitor renal function  # Hypertension: Noted on  "problem list       # DMII: A1C = 7.1 % (Ref range: <5.7 %) within past 6 months   # Obesity: Estimated body mass index is 34.68 kg/m  as calculated from the following:    Height as of 4/18/24: 1.6 m (5' 3\").    Weight as of this encounter: 88.8 kg (195 lb 12.8 oz)., PRESENT ON ADMISSION            Disposition Plan     Medically Ready for Discharge: Anticipated in 2-4 Days             Liza Mariano MD  Hospitalist Service  [facility]  Securely message with [employer] (more info)  Text page via [facility] Paging/Directory   ______________________________________________________________________    Interval History   Was examined at the bedside, states she has pain at the incision site when she moves.  Nephrology consulted due to worsening BRAXTON on CKD    Physical Exam   Vital Signs: Temp: 98  F (36.7  C) Temp src: Oral BP: 121/58 Pulse: 58   Resp: 18 SpO2: 90 % O2 Device: None (Room air) Oxygen Delivery: 1 LPM  Weight: 195 lbs 12.8 oz    GENERAL: Patient was seen and examined at bedside with no acute distress, off supplemental oxygen  LUNGS: Bilateral air entry, clear to auscultation bilaterally  CARDIOVASCULAR:  Regular rate and rhythm with no murmurs, gallops or rubs.  ABDOMEN:  Normal bowel sounds. Soft; nontender   EXT: no edema    SKIN:  No acute rashes.   NEUROLOGIC:  Grossly nonfocal    Medical Decision Making       55 MINUTES SPENT BY ME on the date of service doing chart review, history, exam, documentation & further activities per the note.      Data     I have personally reviewed the following data over the past 24 hrs:    N/A  \   N/A   / N/A     N/A N/A N/A /  170 (H)   N/A N/A 4.02 (H) \     Ferritin:  N/A % Retic:  N/A LDH:  N/A       Imaging results reviewed over the past 24 hrs:   Recent Results (from the past 24 hour(s))   US Renal Complete Non-Vascular    Narrative    EXAM: US RENAL COMPLETE NON-VASCULAR  LOCATION: [facility]  DATE: 5/9/2024    INDICATION: " elie  COMPARISON: PET/CT 4/3/2024  TECHNIQUE: Routine Bilateral Renal and Bladder Ultrasound.    FINDINGS:    RIGHT KIDNEY: 8.6 x 3 x 3.9 cm. Atrophied  superior half. No hydronephrosis. Cortical medullary differentiation is preserved in the lower half.    LEFT KIDNEY: 11.4 x 4.3 x 5.7 cm. Normal without hydronephrosis or masses. Preserved cortical medullary differentiation.    BLADDER: Mildly distended and unremarkable.    No ascites.      Impression    IMPRESSION:  1.  No evidence of hydronephrosis.  2.  Atrophy of the  superior half of the right kidney again noted.

## 2024-05-09 NOTE — PLAN OF CARE
"- VSS, 1L O2 NC  - House paged @ [phone] for increased SOB after albuterol with an increased in back and neck pain. House to be contacted if worsening of symptoms  - Pt refused hydroxyzine at this time, may be open to it in the future if pain or anxiety worse  - O2 93% on RA, chest CT done to evaluate per Dr. Mariano  - Pain \"0-6\" in groin/hips/lower abd, scheduled tylenol and PRN 2.5 mg oxycodone given  -  + 242  - Urine culture sent, urine green  - Independent in room  - Stinging pain around incision after urinating     Xiomara Montilla, RN   Goal Outcome Evaluation:      Problem: Adult Inpatient Plan of Care  Goal: Optimal Comfort and Wellbeing  Outcome: Progressing  Intervention: Monitor Pain and Promote Comfort  Recent Flowsheet Documentation  Taken 5/9/2024 1544 by Xiomara Montilla, RN  Pain Management Interventions: medication (see MAR)     Problem: Surgery Nonspecified  Goal: Effective Urinary Elimination  Outcome: Progressing         "

## 2024-05-09 NOTE — PROGRESS NOTES
GYN/ONC PROGRESS NOTE    cc: POD# 2 s/p Modified radical vulvectomy, [place] lymph node injection and bilateral inguinal lymph node dissection    HPI: Doing ok overall. Feeling a bit overwhelmed this morning. Had increased pain overnight, did end up trying a half tab of oxycodone. Has been able to ambulate in the halls. Eating ok without nausea, didn't drink much yesterday. Voiding ok. Still on 1 L supplemental O2 this am.     EXAM:    Intake/Output Summary (Last 24 hours) at 5/8/2024 0823  Last data filed at 5/8/2024 0622  Gross per 24 hour   Intake 1340 ml   Output 400 ml   Net 940 ml       U/O: not recorded  Drains: N/A    /61 (BP Location: Right arm)   Pulse 56   Temp 97.8  F (36.6  C) (Oral)   Resp 18   Wt 88.8 kg (195 lb 12.8 oz)   SpO2 90%   BMI 34.68 kg/m      GENERAL: alert, NAD, obese  GROIN: bilateral incisions c/d/I with surrounding ecchymosis, no edema  : limited exam, no drainage on pad, incision c/d/i  EXTREMITY: no edema  SKIN: warm and dry, no jaundice, no rashes      LABS  Recent Labs   Lab Test 05/08/24  0539 04/18/24  0755   WBC 9.5 6.6   RBC 3.60* 4.18   HGB 10.8* 12.7   HCT 32.7* 37.3   MCV 91 89   MCH 30.0 30.4   MCHC 33.0 34.0    189       Recent Labs   Lab Test 05/08/24  0539 04/18/24  0755   POTASSIUM 4.4 4.5   CHLORIDE 99 100   BUN 61.8* 59.9*       Recent Labs   Lab Test 11/13/23  1140 11/29/22  0640 08/16/20 2017 08/16/20  1514   PROTEIN  --   --   --  200 mg/dL*   BILITOTAL 0.7 0.5   < >  --    AST 19 26   < >  --    ALT 14 18   < >  --     < > = values in this interval not displayed.       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 She is s/p modified radical vulvectomy with SNL injection and bilateral inguinal lymph node dissection on 5/7/24.    PLAN:  Routine post-op cares, overall pt doing well. Reviewed procedure and operative findings with  patient. May apply Silvadene cream to vulvar incision BID.   Hospitalist consulted for management of co morbidities, appreciate input.   Oncology: Final pathology pending.  Pain: Continue PRN Tylenol and Oxycodone. IV Dilaudid available prn. Avoid NSAIDS due to hx of CKD. Ice packs PRN.   PULM: CXR normal, wean off O2 as able, possible sleep apnea contributing. Hx of asthma. Past smoker. Continue frequent IS.   CARD: sinus bradycardia after surgery, currently on tele monitoring. Now improved.  GI: continue low carb diet.  : Voiding, reviewed use of sparkle-bottle after voiding to help keep incision clean.  Renal: Cr increased to 4.02 from 2.94 yesterday (baseline prior to surgery around 2.6). Nephrology consulted per hospitalist, appreciate recommendations.   DVT prophylaxis: SCD's while in bed. Lovenox 30mg subcutaneous daily while inpatient. Ambulation encouraged 4x/daily.  ID:  Afebrile. Monitor. May need antibiotics post-operatively, will review prior to discharge. Rx for doxycycline prophylaxis sent to pharmacy.  Heme: Hgb yesterday 10.8.  Asymptomatic. VSS.  PT consult to assess for any home needs.      Dispo: pending nephrology and hospitalist recommendations        Marguerite Bhatia PA-C  [facility]  Gyn Oncology  [phone]

## 2024-05-09 NOTE — DISCHARGE SUMMARY
HOSPITAL DISCHARGE SUMMARY    Patient Name: Mariama Keene  YOB: 1955 Age: 68 year old  Medical Record Number: [id number]  Primary Physician: Indiana Nixon  Phone: [phone]  Admission Date: 5/7/2024  Discharge Date: 5/12//24    Mariama Keene  will be discharged from [facility] to Home.    PRINCIPAL DISCHARGE DIAGNOSIS: Vulvar cancer, final pathology pending.      BRIEF HOSPITAL COURSE: This 68 year old female admitted following the procedure stated below. She tolerated the procedure well. The hospitalist service was consulted for management of medical co-morbidities. On POD#2, patient's Cr increased to 4 from 2.9 and nephrology was consulted. Cr improved to 2.74 on the day of discharge. She also required oxygen intermittently with negative chest CT , thought to be due to underlying JAMAR. Otherwise uneventful post operative course and discharge to home on POD #5 with adequate pain control, tolerating orals, voiding and ambulating.    PROCEDURES PERFORMED DURING HOSPITALIZATION:   Modified radical vulvectomy  [place] lymph node injection and bilateral inguinal lymph node dissection    COMPLICATIONS IN HOSPITAL: None    CONSULTATIONS:  Hospitalist  Nephrology    PERTINENT FINDINGS/RESULTS AT DISCHARGE:   BP (!) 178/81   Pulse 71   Temp 98  F (36.7  C) (Oral)   Resp 20   Wt 88.8 kg (195 lb 12.8 oz)   SpO2 93%   BMI 34.68 kg/m      Latest Laboratory Results:  Chem:  CBC RESULTS:   Recent Labs   Lab Test 05/08/24  0539   WBC 9.5   RBC 3.60*   HGB 10.8*   HCT 32.7*   MCV 91   MCH 30.0   MCHC 33.0   RDW 12.2        Last Basic Metabolic Panel:  Lab Results   Component Value Date     05/08/2024      Lab Results   Component Value Date    POTASSIUM 4.4 05/08/2024    POTASSIUM 4.0 06/17/2022     Lab Results   Component Value Date    CHLORIDE 99 05/08/2024    CHLORIDE 106 06/17/2022     Lab Results   Component Value Date    BILL 8.5 05/08/2024     Lab Results   Component Value Date     CO2 23 05/08/2024    CO2 23 06/17/2022     Lab Results   Component Value Date    BUN 61.8 05/08/2024    BUN 39 06/17/2022     Lab Results   Component Value Date    CR 4.02 05/09/2024     Lab Results   Component Value Date     05/09/2024     06/17/2022         IMPORTANT PENDING TEST RESULTS:  Pathology    CONDITION AT DISCHARGE:    Stabilized    DISCHARGE ORDERS  Current Discharge Medication List        START taking these medications    Details   acetaminophen (TYLENOL) 325 MG tablet Take 3 tablets (975 mg) by mouth every 6 hours as needed for mild pain    Associated Diagnoses: Vulvar cancer (H)      doxycycline hyclate (VIBRAMYCIN) 100 MG capsule Take 1 capsule (100 mg) by mouth 2 times daily  Qty: 14 capsule, Refills: 0    Associated Diagnoses: Vulvar cancer (H)      oxyCODONE (ROXICODONE) 5 MG tablet Take 1 tablet (5 mg) by mouth every 4 hours as needed for moderate to severe pain  Qty: 8 tablet, Refills: 0    Associated Diagnoses: Vulvar cancer (H)      senna-docusate (SENOKOT-S/PERICOLACE) 8.6-50 MG tablet Take 1-2 tablets by mouth 2 times daily as needed for constipation (While on oral opioids.)    Associated Diagnoses: Vulvar cancer (H)      silver sulfADIAZINE (SILVADENE) 1 % external cream Apply topically 2 times daily  Qty: 50 g, Refills: 1    Associated Diagnoses: Vulvar cancer (H)           CONTINUE these medications which have NOT CHANGED    Details   amLODIPine (NORVASC) 5 MG tablet Take 5 mg by mouth daily      aspirin (ASA) 325 MG EC tablet Take 325 mg by mouth every 6 hours as needed for moderate pain      furosemide (LASIX) 40 MG tablet Take by mouth 2 times daily 80 mg in the morning and 40 mg in the evening      isosorbide mononitrate (IMDUR) 30 MG 24 hr tablet Take 1 tablet (30 mg) by mouth daily  Qty: 30 tablet, Refills: 12    Associated Diagnoses: Abnormal cardiovascular stress test; Type 2 diabetes mellitus with stage 4 chronic kidney disease, without long-term current use of  insulin (H); Chronic diastolic congestive heart failure (H); Mixed hyperlipidemia; Tobacco user; Essential hypertension, benign      ketoconazole (NIZORAL) 2 % external shampoo Apply topically daily as needed for itching or irritation      levothyroxine (SYNTHROID/LEVOTHROID) 100 MCG tablet Take 1 tablet (100 mcg) by mouth daily  Qty: 30 tablet, Refills: 0    Associated Diagnoses: Acquired hypothyroidism      lidocaine (XYLOCAINE) 5 % external ointment Apply topically 4 times daily as needed for moderate pain      LORazepam (ATIVAN) 0.5 MG tablet Take 0.5 mg by mouth daily as needed for anxiety      losartan (COZAAR) 50 MG tablet Take 50 mg by mouth daily      metoprolol tartrate (LOPRESSOR) 50 MG tablet [METOPROLOL TARTRATE (LOPRESSOR) 50 MG TABLET] Take 2 tablets (100 mg total) by mouth 2 (two) times a day.  Qty: 60 tablet, Refills: 0    Associated Diagnoses: Essential hypertension, benign      mometasone (NASONEX) 50 MCG/ACT nasal spray Spray 2 sprays into both nostrils daily as needed (rhinitis)      NOVOLOG FLEXPEN 100 UNIT/ML soln Inject 8-15 Units Subcutaneous 3 times daily (with meals) plus sliding scale  151-175- 1 unit  176-200- 2 units  201-225- 3 units  226-250- 4 units  251-275- 5 units   276-300- 6 units  301-325- 7 units  326-350- 8 units  351-375- 9 units      omeprazole (PRILOSEC) 20 MG capsule [OMEPRAZOLE (PRILOSEC) 20 MG CAPSULE] Take 1 capsule (20 mg total) by mouth daily before breakfast.  Qty: 30 capsule, Refills: 0    Associated Diagnoses: Gastroesophageal reflux disease without esophagitis      rosuvastatin (CRESTOR) 40 MG tablet Take 1 tablet (40 mg) by mouth daily  Qty: 90 tablet, Refills: 3    Associated Diagnoses: Abnormal cardiovascular stress test; Type 2 diabetes mellitus with stage 4 chronic kidney disease, without long-term current use of insulin (H); Chronic kidney disease, stage 3b (H); Hypertension, unspecified type; Obesity (BMI 30.0-34.9); Polyneuropathy due to type 2 diabetes  mellitus (H)      TRESIBA FLEXTOUCH 100 UNIT/ML pen Inject 54 Units Subcutaneous daily      WIXELA INHUB 250-50 MCG/ACT inhaler Inhale 1 puff into the lungs 2 times daily      albuterol (PROAIR HFA/PROVENTIL HFA/VENTOLIN HFA) 108 (90 Base) MCG/ACT inhaler Inhale 2 puffs into the lungs every 4 hours as needed for shortness of breath, wheezing or cough      BD PEN NEEDLE ALEJANDRO 2ND GEN 32G X 4 MM miscellaneous USE THREE TIMES DAILY WITH NOVOLOG.      Continuous Blood Gluc Sensor (FREESTYLE TEMI 2 SENSOR) MISC Change every 14 days.      Lancets (ONETOUCH DELICA PLUS [id number]) MISC TEST THREE TIMES DAILY      ONETOUCH VERIO IQ test strip test three times daily             FOLLOW-UP: Mariama Keene should see Indiana Nixon.    Specialty follow-up: with Dr. Vishal Altamirano's office in 1-2 weeks for a wound check.    AFTER HOSPITAL RECOMMENDATIONS  As above.      Physician(s) in addition to primary physician who should receive a copy:  Indiana Nixon

## 2024-05-09 NOTE — CONSULTS
NEPHROLOGY CONSULTATION    Mariama Keene  [address]     [facility] [address]  [phone] (home)   68 year old female  [id number]  PMD: Indiana Nixon    CC: I was asked to see Mariama Keene by Dr. Mariano to evaluate her BRAXTON on CKD.    ASSESSMENT AND RECOMMENDATIONS:  69yo woman with CKD from DM, HTN admit for valvulectomy for SCC and now with BRAXTON.    1.BRAXTON: pre-renal vs ATN post procedure. No IV contrast, no NSAIDS, no documented hypotension.   -Hold lasix and losartan   -IV albumin 50grams IV once today.   -Check UA, UPCR, renal US   -No indication for HD   -Daily renal function labs, wts     2. CKD 4: CKD due to longstanding DM2, also with diabetic retinopathy and HTN, also with ureteral reflux and required partial rt nephrectomy in '80s and did have very frequent UTI in past. Workup with rt renal atrophy and echogenic kidneys on ultrasound, 2.6 gm proteinuria, no paraproteins. Has outpt follow up with Dr. Ott.    3. Hypertension: BP is at goal.   --Hold lasix and losartan. Trend.      4. Anemia: mild. New after surgery. Trend per primary.      Nathanael Pascual MD  [facility] [place] Office: [phone]    HPI: Mariama Keene is a 68 year old woman who I am asked to see for management of BRAXTON on CKD radical valvulectomy for SCC of the vulva. She follows with Dr. Ott at [facility]. Her creatinine had been in the range of 2.6-3mg/dl, now up to 4.02mg/dl. no IV contrast, no sig nsaids. No hypotension. She reports fatigue. She denies fever, cp, sob. +leg edema chronic    ROS: Other than above, a comprehensive ROS was negative.        PMH:  Patient Active Problem List   Diagnosis    Insulin dependent type 2 diabetes mellitus (H)    Mixed hyperlipidemia    Obesity    Essential hypertension, benign    Esophageal Reflux    Anxiety    Chronic fatigue syndrome    Hypothyroidism    Long term current use of insulin (H)    Macromastia    Polyneuropathy due to type 2 diabetes  mellitus (H)    Recurrent major depression in full remission (H24)    Tobacco user    Vitamin D deficiency    Gastroesophageal reflux disease, esophagitis presence not specified    Obesity (BMI 30.0-34.9)    COVID-19    Acute kidney injury (H24)    Viral pneumonia    RSV infection    Hypertension, unspecified type    Chronic kidney disease, stage 3b (H)    Vulvar cancer (H)       [unfilled]    Allergies:   Allergies   Allergen Reactions    Alprazolam Other (See Comments)    Cephalexin Rash and Swelling    Exenatide Other (See Comments)    Hydralazine      Flush and Elevated BP     Iodinated Contrast Media [Iodinated Contrast Media] Unknown    Lisinopril Other (See Comments)    Sulfa (Sulfonamide Antibiotics) [Sulfa Antibiotics] Other (See Comments)    Macrobid [Nitrofurantoin] Rash       Social History:   Social History     Socioeconomic History    Marital status:      Spouse name: Not on file    Number of children: Not on file    Years of education: Not on file    Highest education level: Not on file   Occupational History    Not on file   Tobacco Use    Smoking status: Former     Current packs/day: 0.00     Types: Cigarettes     Quit date: 1992     Years since quittin.3    Smokeless tobacco: Never   Substance and Sexual Activity    Alcohol use: Yes    Drug use: Never    Sexual activity: Not Currently     Birth control/protection: Surgical   Other Topics Concern    Not on file   Social History Narrative    Not on file     Social Determinants of Health     Financial Resource Strain: Not on file   Food Insecurity: Not on file   Transportation Needs: Not on file   Physical Activity: Not on file   Stress: Not on file   Social Connections: Not on file   Interpersonal Safety: Not on file   Housing Stability: Not on file         Family History:   Family History   Problem Relation Age of Onset    Breast Cancer Maternal Aunt 45.00    Alzheimer Disease Mother     Hypertension Mother     Chronic Obstructive  "Pulmonary Disease Father           Physical Exam:  Vital signs:  Temp: 97.8  F (36.6  C) Temp src: Oral BP: 117/61 Pulse: 56   Resp: 18 SpO2: 90 % O2 Device: Nasal cannula Oxygen Delivery: 1 LPM   Weight: 88.8 kg (195 lb 12.8 oz)  Estimated body mass index is 34.68 kg/m  as calculated from the following:    Height as of 4/18/24: 1.6 m (5' 3\").    Weight as of this encounter: 88.8 kg (195 lb 12.8 oz).       GENERAL: NAD  HEENT: NCAT, pupils equal, sclerae not icteric, MMM  NECK: Supple.Trachea midline.   LYMPHATIC: No cervical lymphadenopathy  LUNGS: no respiratory distress,  HEART: + leg edema.   ABDOMEN: Soft, NT,   PSYCH: Alert, normal affect  NEURO:  sensation to light touch intact  MUSC/SKEL: diminished muscle mass, no joint effusions evident  SKIN: No rash     Potassium (mmol/L)   Date Value   05/08/2024 4.4   04/18/2024 4.5   04/09/2024 4.4   06/17/2022 4.0   03/16/2022 4.5   07/14/2021 5.1 (H)     Chloride (mmol/L)   Date Value   05/08/2024 99   06/17/2022 106     Urea Nitrogen (mg/dL)   Date Value   05/08/2024 61.8 (H)   06/17/2022 39 (H)     Albumin (g/dL)   Date Value   11/13/2023 4.3   06/17/2022 3.6     TSH (uIU/mL)   Date Value   11/13/2023 1.14   03/16/2022 5.88 (H)     Hemoglobin (g/dL)   Date Value   05/08/2024 10.8 (L)   04/18/2024 12.7   04/04/2024 12.3     Iron (ug/dL)   Date Value   05/08/2024 87     Iron Sat Index (%)   Date Value   05/08/2024 40   .    Above labs reviewed by me       Nathanael Pascual MD  "

## 2024-05-09 NOTE — PLAN OF CARE
Physical Therapy Discharge Summary    Reason for therapy discharge:    All goals and outcomes met, no further needs identified.    Progress towards therapy goal(s). See goals on Care Plan in Epic electronic health record for goal details.  Goals met    Therapy recommendation(s):    Continue home exercise program.and ambulation with nursing staff while remains in hospital.

## 2024-05-09 NOTE — PLAN OF CARE
Problem: Comorbidity Management  Goal: Blood Glucose Levels Within Targeted Range  Outcome: Progressing     Problem: Comorbidity Management  Goal: Blood Pressure in Desired Range  Outcome: Progressing     Problem: Surgery Nonspecified  Goal: Absence of Infection Signs and Symptoms  Outcome: Progressing     Problem: Surgery Nonspecified  Goal: Optimal Pain Control and Function  Outcome: Progressing     Problem: Surgery Nonspecified  Goal: Effective Urinary Elimination  Outcome: Progressing   Goal Outcome Evaluation:    Pt slept between cares. Alert and oriented x4. VSS on room air. Prn tylenol given for pain in groin at surgical site rated 4/10. Bilateral groin incisions dry, intact, open to air. Silvadene ointment applied to vulva incision per orders. Patient ambulated in the serna way in the evening independently. Steady on her feet. NSR on cardiac monitor. No complaints of chest pain or shortness of breath.  Woke up at 0500 in a lot of pain. Describes as ripping and radiating to her hips. Pt hesitant to take pain meds. Eventually agreed to take 2.5 mg oxycodone and tylenol. Pain under control on reassessment. Pt will potentially discharge home today.

## 2024-05-09 NOTE — PROGRESS NOTES
SPIRITUAL HEALTH SERVICES Note     Saw pt Mariama Keene and administered Mandaeism sacrament of anointing for the healing of the sick.    Fr. Andres Rosario

## 2024-05-09 NOTE — PLAN OF CARE
"  Problem: Comorbidity Management  Goal: Blood Glucose Levels Within Targeted Range  Outcome: Progressing     Problem: Surgery Nonspecified  Goal: Optimal Pain Control and Function  Outcome: Progressing  Intervention: Prevent or Manage Pain  Recent Flowsheet Documentation  Taken 5/9/2024 0900 by Ghislaine Cabrera RN  Pain Management Interventions:   medication (see MAR)   MD notified (comment)     Problem: Surgery Nonspecified  Goal: Effective Urinary Elimination  Outcome: Progressing     Problem: Surgery Nonspecified  Goal: Effective Oxygenation and Ventilation  Outcome: Progressing   Goal Outcome Evaluation:  Patient has been independent in room this shift. On room air at this time. Tolerating diet. BG-154 and 170. Per patient \"surgical area is mostly painful when I move around\". Given Tylenol and Oxycodone with good relief. Incisions are CDI and SEREG. Urine has been a green color...sample sent for tests ordered. Tele SR.   Renal Ultrasound ordered this afternoon.                      "

## 2024-05-10 LAB
ALBUMIN SERPL BCG-MCNC: 3.8 G/DL (ref 3.5–5.2)
ANION GAP SERPL CALCULATED.3IONS-SCNC: 14 MMOL/L (ref 7–15)
BUN SERPL-MCNC: 88.2 MG/DL (ref 8–23)
CALCIUM SERPL-MCNC: 8.2 MG/DL (ref 8.8–10.2)
CHLORIDE SERPL-SCNC: 99 MMOL/L (ref 98–107)
CREAT SERPL-MCNC: 3.79 MG/DL (ref 0.51–0.95)
DEPRECATED HCO3 PLAS-SCNC: 21 MMOL/L (ref 22–29)
EGFRCR SERPLBLD CKD-EPI 2021: 12 ML/MIN/1.73M2
ERYTHROCYTE [DISTWIDTH] IN BLOOD BY AUTOMATED COUNT: 12.4 % (ref 10–15)
GLUCOSE BLDC GLUCOMTR-MCNC: 186 MG/DL (ref 70–99)
GLUCOSE BLDC GLUCOMTR-MCNC: 193 MG/DL (ref 70–99)
GLUCOSE BLDC GLUCOMTR-MCNC: 221 MG/DL (ref 70–99)
GLUCOSE BLDC GLUCOMTR-MCNC: 238 MG/DL (ref 70–99)
GLUCOSE BLDC GLUCOMTR-MCNC: 256 MG/DL (ref 70–99)
GLUCOSE SERPL-MCNC: 209 MG/DL (ref 70–99)
HCT VFR BLD AUTO: 29.2 % (ref 35–47)
HGB BLD-MCNC: 9.6 G/DL (ref 11.7–15.7)
MCH RBC QN AUTO: 31.2 PG (ref 26.5–33)
MCHC RBC AUTO-ENTMCNC: 32.9 G/DL (ref 31.5–36.5)
MCV RBC AUTO: 95 FL (ref 78–100)
PHOSPHATE SERPL-MCNC: 5.7 MG/DL (ref 2.5–4.5)
PLATELET # BLD AUTO: 124 10E3/UL (ref 150–450)
POTASSIUM SERPL-SCNC: 4.3 MMOL/L (ref 3.4–5.3)
RBC # BLD AUTO: 3.08 10E6/UL (ref 3.8–5.2)
SODIUM SERPL-SCNC: 134 MMOL/L (ref 135–145)
WBC # BLD AUTO: 5.9 10E3/UL (ref 4–11)

## 2024-05-10 PROCEDURE — 36415 COLL VENOUS BLD VENIPUNCTURE: CPT | Performed by: INTERNAL MEDICINE

## 2024-05-10 PROCEDURE — 250N000011 HC RX IP 250 OP 636: Performed by: INTERNAL MEDICINE

## 2024-05-10 PROCEDURE — 99233 SBSQ HOSP IP/OBS HIGH 50: CPT | Performed by: STUDENT IN AN ORGANIZED HEALTH CARE EDUCATION/TRAINING PROGRAM

## 2024-05-10 PROCEDURE — 250N000011 HC RX IP 250 OP 636: Performed by: STUDENT IN AN ORGANIZED HEALTH CARE EDUCATION/TRAINING PROGRAM

## 2024-05-10 PROCEDURE — 250N000013 HC RX MED GY IP 250 OP 250 PS 637: Performed by: PHYSICIAN ASSISTANT

## 2024-05-10 PROCEDURE — 120N000001 HC R&B MED SURG/OB

## 2024-05-10 PROCEDURE — 82040 ASSAY OF SERUM ALBUMIN: CPT | Performed by: INTERNAL MEDICINE

## 2024-05-10 PROCEDURE — 85027 COMPLETE CBC AUTOMATED: CPT | Performed by: STUDENT IN AN ORGANIZED HEALTH CARE EDUCATION/TRAINING PROGRAM

## 2024-05-10 PROCEDURE — P9047 ALBUMIN (HUMAN), 25%, 50ML: HCPCS | Performed by: INTERNAL MEDICINE

## 2024-05-10 PROCEDURE — 250N000013 HC RX MED GY IP 250 OP 250 PS 637: Performed by: STUDENT IN AN ORGANIZED HEALTH CARE EDUCATION/TRAINING PROGRAM

## 2024-05-10 RX ORDER — HEPARIN SODIUM 5000 [USP'U]/.5ML
5000 INJECTION, SOLUTION INTRAVENOUS; SUBCUTANEOUS EVERY 8 HOURS
Status: DISCONTINUED | OUTPATIENT
Start: 2024-05-10 | End: 2024-05-12 | Stop reason: HOSPADM

## 2024-05-10 RX ORDER — AMOXICILLIN 250 MG
1 CAPSULE ORAL 2 TIMES DAILY
Status: DISCONTINUED | OUTPATIENT
Start: 2024-05-10 | End: 2024-05-11

## 2024-05-10 RX ORDER — ALBUMIN (HUMAN) 12.5 G/50ML
50 SOLUTION INTRAVENOUS ONCE
Status: COMPLETED | OUTPATIENT
Start: 2024-05-10 | End: 2024-05-10

## 2024-05-10 RX ADMIN — SENNOSIDES AND DOCUSATE SODIUM 1 TABLET: 50; 8.6 TABLET ORAL at 20:58

## 2024-05-10 RX ADMIN — FLUTICASONE FUROATE AND VILANTEROL TRIFENATATE 1 PUFF: 100; 25 POWDER RESPIRATORY (INHALATION) at 21:00

## 2024-05-10 RX ADMIN — PANTOPRAZOLE SODIUM 40 MG: 40 TABLET, DELAYED RELEASE ORAL at 07:41

## 2024-05-10 RX ADMIN — SILVER SULFADIAZINE: 10 CREAM TOPICAL at 09:26

## 2024-05-10 RX ADMIN — HEPARIN SODIUM 5000 UNITS: 10000 INJECTION, SOLUTION INTRAVENOUS; SUBCUTANEOUS at 16:42

## 2024-05-10 RX ADMIN — ACETAMINOPHEN 975 MG: 325 TABLET ORAL at 16:40

## 2024-05-10 RX ADMIN — SILVER SULFADIAZINE: 10 CREAM TOPICAL at 21:29

## 2024-05-10 RX ADMIN — ACETAMINOPHEN 975 MG: 325 TABLET ORAL at 10:57

## 2024-05-10 RX ADMIN — ISOSORBIDE MONONITRATE 30 MG: 30 TABLET, EXTENDED RELEASE ORAL at 20:57

## 2024-05-10 RX ADMIN — SENNOSIDES AND DOCUSATE SODIUM 1 TABLET: 50; 8.6 TABLET ORAL at 09:26

## 2024-05-10 RX ADMIN — ACETAMINOPHEN 975 MG: 325 TABLET ORAL at 21:30

## 2024-05-10 RX ADMIN — ALBUMIN HUMAN 50 G: 0.25 SOLUTION INTRAVENOUS at 10:57

## 2024-05-10 RX ADMIN — HEPARIN SODIUM 5000 UNITS: 10000 INJECTION, SOLUTION INTRAVENOUS; SUBCUTANEOUS at 22:26

## 2024-05-10 RX ADMIN — AMLODIPINE BESYLATE 5 MG: 5 TABLET ORAL at 07:54

## 2024-05-10 RX ADMIN — OXYCODONE HYDROCHLORIDE 2.5 MG: 5 TABLET ORAL at 09:26

## 2024-05-10 RX ADMIN — METOPROLOL TARTRATE 100 MG: 25 TABLET, FILM COATED ORAL at 20:57

## 2024-05-10 RX ADMIN — ATORVASTATIN CALCIUM 80 MG: 40 TABLET, FILM COATED ORAL at 20:58

## 2024-05-10 RX ADMIN — METOPROLOL TARTRATE 100 MG: 25 TABLET, FILM COATED ORAL at 07:54

## 2024-05-10 RX ADMIN — LEVOTHYROXINE SODIUM 100 MCG: 100 TABLET ORAL at 07:40

## 2024-05-10 RX ADMIN — ACETAMINOPHEN 975 MG: 325 TABLET ORAL at 04:43

## 2024-05-10 ASSESSMENT — ACTIVITIES OF DAILY LIVING (ADL)
ADLS_ACUITY_SCORE: 22

## 2024-05-10 NOTE — PROGRESS NOTES
Care Management Follow Up    Length of Stay (days): 3    Expected Discharge Date: 05/11/2024     Concerns to be Addressed:    discharge planning, getting albumin and BP needs to be stabilized  Patient plan of care discussed at interdisciplinary rounds: Yes    Anticipated Discharge Disposition:  home     Anticipated Discharge Services:  TBD  Anticipated Discharge DME:  TBD    Patient/family educated on Medicare website which has current facility and service quality ratings:  na  Education Provided on the Discharge Plan:  yes  Patient/Family in Agreement with the Plan:    yes  Referrals Placed by CM/SW:    Private pay costs discussed: Not applicable    Additional Information:  Pt not medically ready for discharge, anticipate home w/assist at discharge.    Yovani Gardner RN

## 2024-05-10 NOTE — PROGRESS NOTES
GYN/ONC PROGRESS NOTE    cc: POD# 3 s/p Modified radical vulvectomy, [place] lymph node injection and bilateral inguinal lymph node dissection    HPI: Doing well overall. Incisional pain increases with movement, but overall pain is well controlled with tylenol and prn oxycodone. Ambulating in hallway independently. O2 stats 90% after ambulation. Pt notes turquoise colored urine, denies dysuria, voiding well. Eating ok without nausea, admits to poor fluid intake. Intermittently on 1 L supplemental O2. Denies chest pain, cough. Passing flatus, no BM.    EXAM:    Intake/Output Summary (Last 24 hours) at 5/8/2024 0823  Last data filed at 5/8/2024 0622  Gross per 24 hour   Intake 1340 ml   Output 400 ml   Net 940 ml       U/O: not recorded  Drains: N/A    /65 (BP Location: Right arm)   Pulse 64   Temp 97.6  F (36.4  C) (Oral)   Resp 16   Wt 88.8 kg (195 lb 12.8 oz)   SpO2 95%   BMI 34.68 kg/m      GENERAL: alert, NAD, obese  GROIN: bilateral incisions c/d/i with surrounding ecchymosis, no edema, mildly tender  : limited exam, no drainage on pad  EXTREMITY: mild bilateral pitting edema to mid-shin  SKIN: warm and dry, no jaundice, no rashes      LABS  Recent Labs   Lab Test 05/08/24  0539 04/18/24  0755   WBC 9.5 6.6   RBC 3.60* 4.18   HGB 10.8* 12.7   HCT 32.7* 37.3   MCV 91 89   MCH 30.0 30.4   MCHC 33.0 34.0    189       Recent Labs   Lab Test 05/08/24  0539 04/18/24  0755   POTASSIUM 4.4 4.5   CHLORIDE 99 100   BUN 61.8* 59.9*       Recent Labs   Lab Test 11/13/23  1140 11/29/22  0640 08/16/20 2017 08/16/20  1514   PROTEIN  --   --   --  200 mg/dL*   BILITOTAL 0.7 0.5   < >  --    AST 19 26   < >  --    ALT 14 18   < >  --     < > = values in this interval not displayed.       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 She is s/p modified radical vulvectomy with SNL injection  and bilateral inguinal lymph node dissection on 5/7/24.    PLAN:  Routine post-op cares, overall pt doing well. Previously reviewed procedure and operative findings with patient. Apply Silvadene cream to vulvar incision BID.   Hospitalist consulted for management of co morbidities, appreciate input.     Oncology: Final pathology pending.    Pain: Continue scheduled Tylenol and PRN Oxycodone. IV Dilaudid available prn. Avoid NSAIDS due to hx of CKD. Ice packs PRN.     PULM: Chronic Hypoxia  CXR normal. CT Chest negative.   Wean off O2 as able, possible sleep apnea contributing. ?URI. Hx of asthma. Past smoker. Continue frequent IS.     CARD: sinus bradycardia after surgery, currently on tele monitoring. Now improved.    GI: continue low carb diet. Last BM 5 days ago, Added Senna-S BID.    : Voiding, reviewed use of sparkle-bottle after voiding to help keep incision clean. Ok to wear pad, change as needed.     Renal: BRAXTON on CKD- Cr improved to 3.79 from 4.02 yesterday (baseline prior to surgery around 2.6-3).   Nephrology following, appreciate recommendations.   Renal US with atrophy of superior half of R kidney, no hydro. s/p IV albumin, holding lasix and losartan.   UA suspicious, started on Cipro, UCx pending. Turquoise urine, ?potentially related to medication vs infection.    Encouraged increased hydration.     DVT prophylaxis: SCD's while in bed. Lovenox 30mg subcutaneous daily while inpatient. Defer change to heparin for VTE prophylaxis to Hospitalist. Ambulation encouraged 4x/daily.    ID:  Afebrile. Monitor. Plan for prophylactic antibiotics post-operatively. Rx for doxycycline sent to pharmacy. May need to ye abx pending Ucx results.    Heme: Hgb 9.6 from 10.8 yesterday. Monitor daily. Asymptomatic. VSS.    S/p PT consult, no home needs.          Dispo: doing well from a surgical standpoint, discharge pending nephrology and hospitalist recommendations.      Olya Cuevas PA-C  [facility]  [place]  [facility]  [phone]

## 2024-05-10 NOTE — PROGRESS NOTES
NEPHROLOGY PROGRESS NOTE    Date of service: 05/10/24     CC: BRAXTON, CKD      ASSESSMENT/RECOMMENDATIONS:  69yo woman with CKD from DM, HTN admit for valvulectomy for SCC and now with BRAXTON.     1.BRAXTON: pre-renal vs ATN post procedure. No IV contrast, no NSAIDS, no documented hypotension.UA contaminated, ?UTI. Renal US without hydro,              -Hold lasix and losartan              -IV albumin 50grams IV once again today.              -No indication for HD              -Daily renal function labs, wts                2. CKD 4: CKD due to longstanding DM2, also with diabetic retinopathy and HTN, also with ureteral reflux and required partial rt nephrectomy in '80s and did have very frequent UTI in past. Workup with rt renal atrophy and echogenic kidneys on ultrasound, 2.6 gm proteinuria, no paraproteins. Has outpt follow up with Dr. Ott.Her creatinine had been in the range of 2.6-3mg/dl,       3. Hypertension: BP is at goal.              --Holding lasix and losartan. Trend.        4. Anemia: mild. New after surgery. Trend per primary.    Nathanael Pascual MD  [facility]   Office: [phone]      S: Since yesterday, her creatinine is now improving.  No fever, cp, sob. Stable leg edema.    Current Facility-Administered Medications   Medication Dose Route Frequency Provider Last Rate Last Admin    acetaminophen (TYLENOL) tablet 975 mg  975 mg Oral Q6H Marguerite Bhatia PA-C   975 mg at 05/10/24 0443    albumin human 25 % injection 50 g  50 g Intravenous Once Nathanael Pascual MD   Paused at 05/09/24 1201    albuterol (PROVENTIL HFA/VENTOLIN HFA) inhaler  2 puff Inhalation Q4H PRN Marguerite Bhatia PA-C   2 puff at 05/09/24 2120    amLODIPine (NORVASC) tablet 5 mg  5 mg Oral Daily Marguerite Bhatia PA-C   5 mg at 05/10/24 0754    atorvastatin (LIPITOR) tablet 80 mg  80 mg Oral QPM Liza Mariano MD   80 mg at 05/09/24 2009    calcium carbonate (TUMS) chewable tablet  500 mg  500 mg Oral 4x Daily PRN Marguerite Bhatia PA-C        ciprofloxacin (CIPRO) tablet 500 mg  500 mg Oral Q24H Liza Mariano MD   500 mg at 05/09/24 1706    glucose gel 15-30 g  15-30 g Oral Q15 Min PRN Marguerite Bhatia PA-C        Or    dextrose 50 % injection 25-50 mL  25-50 mL Intravenous Q15 Min PRN Marguerite Bhatia PA-C        Or    glucagon injection 1 mg  1 mg Subcutaneous Q15 Min PRN Marguerite Bhatia PA-C        enoxaparin ANTICOAGULANT (LOVENOX) injection 30 mg  30 mg Subcutaneous Q24H Olya Cuevas PA-C   30 mg at 05/09/24 1544    fluticasone-vilanterol (BREO ELLIPTA) 100-25 MCG/ACT inhaler 1 puff  1 puff Inhalation Daily Marguerite Bhatia PA-C   1 puff at 05/09/24 2005    [Held by provider] furosemide (LASIX) tablet 40 mg  40 mg Oral QPM Marguerite Bhatia PA-C   40 mg at 05/08/24 1633    [Held by provider] furosemide (LASIX) tablet 80 mg  80 mg Oral QAM Marguerite Bhatia PA-C   80 mg at 05/09/24 0829    guaiFENesin (ROBITUSSIN) 20 mg/mL solution 200 mg  200 mg Oral Q4H PRN Liza Mariano MD        HYDROmorphone (DILAUDID) injection 0.1 mg  0.1 mg Intravenous Q2H PRN Marguerite Bhatia PA-C        Or    HYDROmorphone (DILAUDID) injection 0.2 mg  0.2 mg Intravenous Q2H PRN Marguerite Bhatia PA-C        hydrOXYzine HCl (ATARAX) tablet 10 mg  10 mg Oral Q6H PRN Marguerite Bhatia PA-C        insulin aspart (NovoLOG) injection (RAPID ACTING)  1-10 Units Subcutaneous TID AC Liza Mariano MD   3 Units at 05/10/24 0741    insulin aspart (NovoLOG) injection (RAPID ACTING)  1-7 Units Subcutaneous At Bedtime Liza Mariano MD   2 Units at 05/09/24 2140    insulin degludec (TRESIBA) 100 UNIT/ML injection 30 Units  30 Units Subcutaneous At Bedtime Liza Mariano MD   30 Units at 05/09/24 2006    isosorbide mononitrate (IMDUR) 24 hr tablet 30 mg  30 mg Oral Daily Marguerite Bhatia PA-C   30 mg at 05/09/24 2009    levothyroxine (SYNTHROID/LEVOTHROID)  tablet 100 mcg  100 mcg Oral Daily Marguerite Bhatia PA-C   100 mcg at 05/10/24 0740    lidocaine (LMX4) cream   Topical Q1H PRN Marguerite Bhatia PA-C        lidocaine 1 % 0.1-1 mL  0.1-1 mL Other Q1H PRN Marguerite Bhatia PA-C        LORazepam (ATIVAN) tablet 0.5 mg  0.5 mg Oral Q6H PRN Marguerite Bhatia PA-C        [Held by provider] losartan (COZAAR) tablet 50 mg  50 mg Oral Daily Marguerite Bhatia PA-C   50 mg at 05/08/24 0830    metoprolol tartrate (LOPRESSOR) tablet 100 mg  100 mg Oral BID Marguerite Bhatia PA-C   100 mg at 05/10/24 0754    naloxone (NARCAN) injection 0.2 mg  0.2 mg Intravenous Q2 Min PRN Marguerite Bhatia PA-C        Or    naloxone (NARCAN) injection 0.4 mg  0.4 mg Intravenous Q2 Min PRN Marguerite Bhatia PA-C        Or    naloxone (NARCAN) injection 0.2 mg  0.2 mg Intramuscular Q2 Min PRN Marguerite Bhatia PA-C        Or    naloxone (NARCAN) injection 0.4 mg  0.4 mg Intramuscular Q2 Min PRN Marguerite Bhatia PA-C        ondansetron (ZOFRAN ODT) ODT tab 4 mg  4 mg Oral Q6H PRN Marguerite Bhatia PA-C        Or    ondansetron (ZOFRAN) injection 4 mg  4 mg Intravenous Q6H PRN Marguerite Bhatia PA-C        oxyCODONE IR (ROXICODONE) half-tab 2.5 mg  2.5 mg Oral Q4H PRN Marguerite Bhatia PA-C   2.5 mg at 05/10/24 0926    Or    oxyCODONE (ROXICODONE) tablet 5 mg  5 mg Oral Q4H PRN Marguerite Bhatia PA-C   5 mg at 05/07/24 1935    pantoprazole (PROTONIX) EC tablet 40 mg  40 mg Oral QAM AC Marguerite Bhatia PA-C   40 mg at 05/10/24 0741    polyethylene glycol (MIRALAX) Packet 17 g  17 g Oral Daily PRN Liza Mariano MD   17 g at 05/08/24 1434    prochlorperazine (COMPAZINE) injection 5 mg  5 mg Intravenous Q6H PRN Marguerite Bhatia PA-C        Or    prochlorperazine (COMPAZINE) tablet 5 mg  5 mg Oral Q6H PRN Marguerite Bhatia PA-C        senna-docusate (SENOKOT-S/PERICOLACE) 8.6-50 MG per tablet 1 tablet  1 tablet Oral BID Mason,  ZUHAIR Dobson   1 tablet at 05/10/24 0926    silver sulfADIAZINE (SILVADENE) 1 % cream   Topical BID CuevasOlya PA-C   Given at 05/10/24 0926    sodium chloride (PF) 0.9% PF flush 3 mL  3 mL Intracatheter Q8H Marguerite Bhatia PA-C   3 mL at 05/09/24 2019    sodium chloride (PF) 0.9% PF flush 3 mL  3 mL Intracatheter q1 min prn Marguerite Bhatia PA-C            No interval change in SH, FH.    Physical Exam  /65 (BP Location: Right arm)   Pulse 64   Temp 97.6  F (36.4  C) (Oral)   Resp 16   Wt 88.8 kg (195 lb 12.8 oz)   SpO2 95%   BMI 34.68 kg/m    GENERAL: NAD  HEENT: NCAT, pupils equal, sclerae not icteric, MMM  NECK: Supple.Trachea midline.   LUNGS: no respiratory distress,  HEART:  + leg edema.   ABDOMEN: Soft, NT. obese  PSYCH: Alert, normal affect  NEURO:  moves all extremities  MUSC/SKEL:no joint effusions evident  SKIN: No rash       Lab Data:  Recent Labs   Lab Test 05/10/24  0705 05/08/24  0539 04/18/24  0755   POTASSIUM 4.3 4.4 4.5   CHLORIDE 99 99 100   ALBUMIN 3.8  --   --      Recent Labs   Lab Test 05/10/24  0705 05/08/24  0539 04/18/24  0755 04/09/24  1037   BUN 88.2* 61.8* 59.9* 50.2*     Recent Labs   Lab Test 05/10/24  0705 05/08/24  0539 04/18/24  0755 08/21/20  1048 08/20/20  1026 08/20/20  1025 08/19/20  1407   WBC 5.9 9.5 6.6   < >  --    < > 4.7   HGB 9.6* 10.8* 12.7   < >  --    < > 12.5   MCV 95 91 89   < >  --    < > 89   * 158 189   < >  --    < > 173   IRON  --  87  --   --   --   --   --    IRONSAT  --  40  --   --   --   --   --    LDH  --   --   --   --  169  --  173    < > = values in this interval not displayed.     Recent Labs   Lab Test 11/13/23  1140 12/06/22  1135   TSH 1.14 3.09       I reviewed the above labs

## 2024-05-10 NOTE — PLAN OF CARE
Problem: Adult Inpatient Plan of Care  Goal: Plan of Care Review  Description: The Plan of Care Review/Shift note should be completed every shift.  The Outcome Evaluation is a brief statement about your assessment that the patient is improving, declining, or no change.  This information will be displayed automatically on your shift  note.  Outcome: Progressing     Problem: Risk for Delirium  Goal: Improved Behavioral Control  Intervention: Minimize Safety Risk  Recent Flowsheet Documentation  Taken 5/10/2024 0416 by Taylor Day, RN  Enhanced Safety Measures: review medications for side effects with activity  Taken 5/10/2024 0000 by Taylor Day, RN  Enhanced Safety Measures: review medications for side effects with activity   Goal Outcome Evaluation:         Pt is alert and oriented. Denies pain. Schedule Tylenol given. PIV saline locked. On 1 L of oxygen. 60 gr carb diet. Independent in her room. Blood sugar at 0200 221 mg/dl. VSS.

## 2024-05-10 NOTE — PROGRESS NOTES
[facility]    Medicine Progress Note - Hospitalist Service    Date of Admission:  5/7/2024    Assessment & Plan   Mariama Keene is a 68 year old female admitted on 5/7/2024. She presented in early March with vaginal irritation and soreness.  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  HPV negative.  Patient presented today for modified radical valvulectomy with sentinel lymph node injection and bilateral inguinal lymph node dissection and is s/p procedure, medicine was consulted for medical comanagement.     Squamous cell carcinoma of the vulva  Presented for planned modified radical valvulectomy, sentinel lymph node injection and bilateral inguinal lymph node dissection and is s/p procedure  Follow-up gynecological oncology for further recommendations  Pain management as per protocol     Chronic Hypoxia, suspect chronic underlying JAMAR/OHS  Off oxygen during daytime and awake  Vitals stable, low suspicion of PE  CT Chest negative, prior smoker quit 1992, no evidence of emphysema  Sleep clinic at discharge ofr suspected JAMAR     URI  C/o cough, sneezing and mild discomfort with deep breathing  No wheezing. Denies chest pain/ discomfort  Procal and BNP not elevated, Viral panel negative  CXR negative  Robitussin prn     BRAXTON on CKD - pre renal vs ATN  Cr 4.02 -> 3.79, baseline appears to be 2.65  UA No casts on UA  US Kidney no evidence of hydronephrosis  Seen by Nephrology, appreciate recommendations  Will hold Losartan, Lasix  Received IV Albumin 50gm 05/09 and 05/10  Monitor Cr    Abnormal UA  Hx recurrent UTI  UA turbid, proteinuria, + , leukocyte esterase with 2 squamous cells - ? contaminant.  No casts on UA  Denies symptoms  Will discontinue antibiotics     Normocytic anemia  Hb 10.8 -> 9.6, likely 2/2 CKD  No  evidence of bleeding  Iron studies consistent with ACD  Monitor Hb     CAD  Cath with moderate disease in LAD and Lcx, severe disease in small RCA; mildly elevated L-sided filling pressures   aspirin 81 mg, beta-blocker, atorvastatin, Imdur  Follow up outpatient with Cardiology     History of hypertension  Monitor vital signs as per protocol  Continue with amlodipine, metoprolol, Imdur  Losartan, Lasix on hold     History of hyperlipidemia  Changed Rosuvastatin to Atorvastatin 80mg due to CrCl     History of CHF  Lasix held due to BRAXTON, not in exacerbation  Monitor volume status closely  Continue with Imdur     History of hypothyroidism  Continue with levothyroxine     History of insulin-dependent type 2 diabetes mellitus  HbA1c 7.1  on Lantus 54 units at home  Blood sugars running in the lower range, likely due to moderate carb diet in the hospital  Patient took 15 units of NovoLog from her home insulin, RN notified - 05/08  Will monitor blood sugars closely  Inc Tresiba 34 units, high sliding scale insulin (will adjust doses with BRAXTON)  Will likely need home dose on discharge     History of mood disorder/anxiety/panic attacks  As needed Ativan, hydroxyzine     History of asthma not in exacerbation  Continue with bronchodilators as needed and appropriate          Diet: Resume pre-procedure diet  Diet  Moderate Consistent Carb (60 g CHO per Meal) Diet    DVT Prophylaxis: Heparin SQ  Rao Catheter: Not present  Lines: None     Cardiac Monitoring: ACTIVE order. Indication: Bradycardias (48 hours)  Code Status: Full Code      Clinically Significant Risk Factors          # Hypocalcemia: Lowest Ca = 8.2 mg/dL in last 2 days, will monitor and replace as appropriate       # Thrombocytopenia: Lowest platelets = 124 in last 2 days, will monitor for bleeding  # Acute Kidney Injury, unspecified: based on a >150% or 0.3 mg/dL increase in last creatinine compared to past 90 day average, will monitor renal function  #  "Hypertension: Noted on problem list       # DMII: A1C = 7.1 % (Ref range: <5.7 %) within past 6 months   # Obesity: Estimated body mass index is 34.68 kg/m  as calculated from the following:    Height as of 4/18/24: 1.6 m (5' 3\").    Weight as of this encounter: 88.8 kg (195 lb 12.8 oz)., PRESENT ON ADMISSION            Disposition Plan     Medically Ready for Discharge: Anticipated Tomorrow             Liza Mariano MD  Hospitalist Service  [facility] [place]  Securely message with [employer] (more info)  Text page via [facility] Paging/Directory   ______________________________________________________________________    Interval History   Was examined at the bedside, complain of pain at incision site with ambulation.  States had poor oral intake.  Had turquoise urine, denies any symptoms, likely contaminant discontinued antibiotics    Physical Exam   Vital Signs: Temp: 97.6  F (36.4  C) Temp src: Oral BP: 138/64 Pulse: 63   Resp: 20 SpO2: 91 % O2 Device: None (Room air) Oxygen Delivery: 1 LPM  Weight: 195 lbs 12.8 oz    GENERAL: Patient was seen and examined at bedside with no acute distress, off supplemental oxygen  LUNGS: Bilateral air entry, clear to auscultation bilaterally  CARDIOVASCULAR:  Regular rate and rhythm with no murmurs, gallops or rubs.  ABDOMEN:  Normal bowel sounds. Soft; nontender   EXT: mild pitting edema b/l  SKIN:  No acute rashes.   NEUROLOGIC:  Grossly nonfocal    Medical Decision Making       55 MINUTES SPENT BY ME on the date of service doing chart review, history, exam, documentation & further activities per the note.      Data     I have personally reviewed the following data over the past 24 hrs:    5.9  \   9.6 (L)   / 124 (L)     134 (L) 99 88.2 (H) /  186 (H)   4.3 21 (L) 3.79 (H) \     ALT: N/A AST: N/A AP: N/A TBILI: N/A   ALB: 3.8 TOT PROTEIN: N/A LIPASE: N/A       Imaging results reviewed over the past 24 hrs:   Recent Results (from the past 24 hour(s))   CT Chest w/o " Contrast    Narrative    EXAM: CT CHEST W/O CONTRAST  LOCATION: [facility]  DATE: 5/9/2024    INDICATION: new hypoxia, underlying emphysema vs other etiology  COMPARISON: PET/CT from 4/3/2024   TECHNIQUE: CT chest without IV contrast. Multiplanar reformats were obtained. Dose reduction techniques were used.  CONTRAST: None.    FINDINGS:   LUNGS AND PLEURA: Mucous in the trachea. Strand of atelectasis right lower lobe.     MEDIASTINUM/AXILLAE: Calcified mitral annulus.     CORONARY ARTERY CALCIFICATION: Mild calcified atherosclerosis left anterior descending coronary artery.     UPPER ABDOMEN: Moderate atrophy upper pole right kidney. Cholecystectomy.     MUSCULOSKELETAL: Moderate degenerative change thoracic spine.       Impression    IMPRESSION:   No findings to account for new hypoxia. No evidence of emphysema.

## 2024-05-10 NOTE — PLAN OF CARE
Problem: Comorbidity Management  Goal: Blood Glucose Levels Within Targeted Range  5/10/2024 1337 by Ghislaine Cabrera, RN  Outcome: Progressing  5/10/2024 1328 by Ghislaine Cabrera RN  Outcome: Progressing     Problem: Surgery Nonspecified  Goal: Optimal Pain Control and Function  5/10/2024 1337 by Ghislaine Cabrera, RN  Outcome: Progressing  5/10/2024 1328 by Ghislaine Cabrera RN  Outcome: Progressing  Intervention: Prevent or Manage Pain  Recent Flowsheet Documentation  Taken 5/10/2024 0926 by Ghislaine Cabrera, RN  Pain Management Interventions: medication (see MAR)  Taken 5/10/2024 0748 by Ghislaine Cabrera RN  Pain Management Interventions: medication (see MAR)   Goal Outcome Evaluation:       Patient tolerating groin discomfort with scheduled tylenol and Oxycodone prn. Walks independent in room and halls. Good appetite. Urine continues green color caused by medication dye per Dr. Pascual. Received Albumin again today. Last BG-238.

## 2024-05-11 LAB
ALBUMIN SERPL BCG-MCNC: 4.3 G/DL (ref 3.5–5.2)
ANION GAP SERPL CALCULATED.3IONS-SCNC: 14 MMOL/L (ref 7–15)
BUN SERPL-MCNC: 77.7 MG/DL (ref 8–23)
CALCIUM SERPL-MCNC: 8.6 MG/DL (ref 8.8–10.2)
CHLORIDE SERPL-SCNC: 100 MMOL/L (ref 98–107)
CREAT SERPL-MCNC: 3.17 MG/DL (ref 0.51–0.95)
DEPRECATED HCO3 PLAS-SCNC: 22 MMOL/L (ref 22–29)
EGFRCR SERPLBLD CKD-EPI 2021: 15 ML/MIN/1.73M2
ERYTHROCYTE [DISTWIDTH] IN BLOOD BY AUTOMATED COUNT: 12.2 % (ref 10–15)
GLUCOSE BLDC GLUCOMTR-MCNC: 188 MG/DL (ref 70–99)
GLUCOSE BLDC GLUCOMTR-MCNC: 190 MG/DL (ref 70–99)
GLUCOSE BLDC GLUCOMTR-MCNC: 214 MG/DL (ref 70–99)
GLUCOSE BLDC GLUCOMTR-MCNC: 223 MG/DL (ref 70–99)
GLUCOSE BLDC GLUCOMTR-MCNC: 243 MG/DL (ref 70–99)
GLUCOSE BLDC GLUCOMTR-MCNC: 254 MG/DL (ref 70–99)
GLUCOSE SERPL-MCNC: 211 MG/DL (ref 70–99)
HCT VFR BLD AUTO: 26.4 % (ref 35–47)
HGB BLD-MCNC: 8.9 G/DL (ref 11.7–15.7)
MCH RBC QN AUTO: 30.5 PG (ref 26.5–33)
MCHC RBC AUTO-ENTMCNC: 33.7 G/DL (ref 31.5–36.5)
MCV RBC AUTO: 90 FL (ref 78–100)
PHOSPHATE SERPL-MCNC: 4.4 MG/DL (ref 2.5–4.5)
PLATELET # BLD AUTO: 132 10E3/UL (ref 150–450)
POTASSIUM SERPL-SCNC: 4 MMOL/L (ref 3.4–5.3)
RBC # BLD AUTO: 2.92 10E6/UL (ref 3.8–5.2)
SODIUM SERPL-SCNC: 136 MMOL/L (ref 135–145)
WBC # BLD AUTO: 5.7 10E3/UL (ref 4–11)

## 2024-05-11 PROCEDURE — 250N000013 HC RX MED GY IP 250 OP 250 PS 637: Performed by: STUDENT IN AN ORGANIZED HEALTH CARE EDUCATION/TRAINING PROGRAM

## 2024-05-11 PROCEDURE — 80069 RENAL FUNCTION PANEL: CPT | Performed by: INTERNAL MEDICINE

## 2024-05-11 PROCEDURE — 120N000001 HC R&B MED SURG/OB

## 2024-05-11 PROCEDURE — 250N000013 HC RX MED GY IP 250 OP 250 PS 637

## 2024-05-11 PROCEDURE — 250N000013 HC RX MED GY IP 250 OP 250 PS 637: Performed by: PHYSICIAN ASSISTANT

## 2024-05-11 PROCEDURE — 250N000011 HC RX IP 250 OP 636: Performed by: STUDENT IN AN ORGANIZED HEALTH CARE EDUCATION/TRAINING PROGRAM

## 2024-05-11 PROCEDURE — 85027 COMPLETE CBC AUTOMATED: CPT | Performed by: STUDENT IN AN ORGANIZED HEALTH CARE EDUCATION/TRAINING PROGRAM

## 2024-05-11 PROCEDURE — 99233 SBSQ HOSP IP/OBS HIGH 50: CPT | Performed by: STUDENT IN AN ORGANIZED HEALTH CARE EDUCATION/TRAINING PROGRAM

## 2024-05-11 PROCEDURE — 36415 COLL VENOUS BLD VENIPUNCTURE: CPT | Performed by: INTERNAL MEDICINE

## 2024-05-11 RX ORDER — POLYETHYLENE GLYCOL 3350 17 G/17G
17 POWDER, FOR SOLUTION ORAL DAILY
Status: DISCONTINUED | OUTPATIENT
Start: 2024-05-11 | End: 2024-05-12 | Stop reason: HOSPADM

## 2024-05-11 RX ORDER — POLYETHYLENE GLYCOL 3350 17 G/17G
17 POWDER, FOR SOLUTION ORAL DAILY
Qty: 510 G | Refills: 0 | Status: SHIPPED | OUTPATIENT
Start: 2024-05-11

## 2024-05-11 RX ORDER — AMOXICILLIN 250 MG
1-2 CAPSULE ORAL 2 TIMES DAILY
Status: DISCONTINUED | OUTPATIENT
Start: 2024-05-11 | End: 2024-05-12 | Stop reason: HOSPADM

## 2024-05-11 RX ORDER — FUROSEMIDE 20 MG
80 TABLET ORAL EVERY MORNING
Status: DISCONTINUED | OUTPATIENT
Start: 2024-05-11 | End: 2024-05-12 | Stop reason: HOSPADM

## 2024-05-11 RX ADMIN — ACETAMINOPHEN 975 MG: 325 TABLET ORAL at 22:18

## 2024-05-11 RX ADMIN — PANTOPRAZOLE SODIUM 40 MG: 40 TABLET, DELAYED RELEASE ORAL at 08:59

## 2024-05-11 RX ADMIN — FLUTICASONE FUROATE AND VILANTEROL TRIFENATATE 1 PUFF: 100; 25 POWDER RESPIRATORY (INHALATION) at 19:33

## 2024-05-11 RX ADMIN — ACETAMINOPHEN 975 MG: 325 TABLET ORAL at 09:51

## 2024-05-11 RX ADMIN — ACETAMINOPHEN 975 MG: 325 TABLET ORAL at 16:23

## 2024-05-11 RX ADMIN — SILVER SULFADIAZINE: 10 CREAM TOPICAL at 09:03

## 2024-05-11 RX ADMIN — HEPARIN SODIUM 5000 UNITS: 10000 INJECTION, SOLUTION INTRAVENOUS; SUBCUTANEOUS at 22:21

## 2024-05-11 RX ADMIN — AMLODIPINE BESYLATE 5 MG: 5 TABLET ORAL at 09:00

## 2024-05-11 RX ADMIN — METOPROLOL TARTRATE 100 MG: 25 TABLET, FILM COATED ORAL at 08:58

## 2024-05-11 RX ADMIN — LEVOTHYROXINE SODIUM 100 MCG: 100 TABLET ORAL at 08:59

## 2024-05-11 RX ADMIN — HEPARIN SODIUM 5000 UNITS: 10000 INJECTION, SOLUTION INTRAVENOUS; SUBCUTANEOUS at 14:54

## 2024-05-11 RX ADMIN — FUROSEMIDE 40 MG: 20 TABLET ORAL at 16:23

## 2024-05-11 RX ADMIN — SENNOSIDES AND DOCUSATE SODIUM 1 TABLET: 50; 8.6 TABLET ORAL at 22:18

## 2024-05-11 RX ADMIN — METOPROLOL TARTRATE 100 MG: 25 TABLET, FILM COATED ORAL at 19:16

## 2024-05-11 RX ADMIN — SENNOSIDES AND DOCUSATE SODIUM 2 TABLET: 50; 8.6 TABLET ORAL at 09:06

## 2024-05-11 RX ADMIN — FUROSEMIDE 80 MG: 20 TABLET ORAL at 12:32

## 2024-05-11 RX ADMIN — ATORVASTATIN CALCIUM 80 MG: 40 TABLET, FILM COATED ORAL at 22:20

## 2024-05-11 RX ADMIN — ISOSORBIDE MONONITRATE 30 MG: 30 TABLET, EXTENDED RELEASE ORAL at 19:16

## 2024-05-11 RX ADMIN — ACETAMINOPHEN 975 MG: 325 TABLET ORAL at 04:19

## 2024-05-11 RX ADMIN — HEPARIN SODIUM 5000 UNITS: 10000 INJECTION, SOLUTION INTRAVENOUS; SUBCUTANEOUS at 09:01

## 2024-05-11 RX ADMIN — POLYETHYLENE GLYCOL 3350 17 G: 17 POWDER, FOR SOLUTION ORAL at 09:06

## 2024-05-11 ASSESSMENT — ACTIVITIES OF DAILY LIVING (ADL)
ADLS_ACUITY_SCORE: 22

## 2024-05-11 NOTE — PLAN OF CARE
Goal Outcome Evaluation:  Minimal pain at rest 0-1. Increases to 5-6 with movement. Prefers tylenol but knows she has oxycodone available if she needs it. Last dose was this morning.  Tolerating diabetic diet. Discouraged BG level are so much higher than at home.  and 256 this shift. Patient restarted on Tresiba and has sliding scale ordered however doses are much lower than what she normally takes.  IV Sl'd in left arm.  On telemetry-NSR  Using IS and getting to 2000.  No dressing to periarea, just sparkle pad. Sparkle bottle also provided to patient for cleansing.  Bilateral groin incisions with dermabond as well as upper and lower vaginal incisions. Silver cream ordered and applied.  Urine pale/light green in color.  Ambulated in halls tonight and to BR independently.

## 2024-05-11 NOTE — PLAN OF CARE
Problem: Adult Inpatient Plan of Care  Goal: Plan of Care Review  Description: The Plan of Care Review/Shift note should be completed every shift.  The Outcome Evaluation is a brief statement about your assessment that the patient is improving, declining, or no change.  This information will be displayed automatically on your shift  note.  Outcome: Progressing     Problem: Risk for Delirium  Goal: Improved Behavioral Control  Outcome: Progressing  Intervention: Minimize Safety Risk  Recent Flowsheet Documentation  Taken 5/11/2024 0100 by Taylor Day RN  Enhanced Safety Measures: review medications for side effects with activity   Goal Outcome Evaluation:             Pt is alert and oriented x4. Denies pain. Schedule Tylenol given. PIV saline locked. On 60 g carb diet. On 1 L of oxygen. O2 sats 94%. On telemetry NSR. Blood sugar at 0200 was 214 mg/dl. Independent. VSS. Slept well the night.

## 2024-05-11 NOTE — PROGRESS NOTES
[facility]    Medicine Progress Note - Hospitalist Service    Date of Admission:  5/7/2024    Assessment & Plan   Mariama Keene is a 68 year old female admitted on 5/7/2024. She presented in early March with vaginal irritation and soreness.  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  HPV negative.  Patient presented today for modified radical valvulectomy with sentinel lymph node injection and bilateral inguinal lymph node dissection and is s/p procedure, medicine was consulted for medical comanagement.     Squamous cell carcinoma of the vulva  Presented for planned modified radical valvulectomy, sentinel lymph node injection and bilateral inguinal lymph node dissection and is s/p procedure  Follow-up gynecological oncology for further recommendations  Pain management as per protocol     Chronic Hypoxia, suspect chronic underlying JAMAR/OHS  Off oxygen during daytime and awake, requiring 1-2 L NC at night  Vitals stable, low suspicion of PE  CT Chest negative, prior smoker quit 1992, no evidence of emphysema  Sleep clinic at discharge for suspected JAMAR     URI  C/o cough, sneezing and mild discomfort with deep breathing  No wheezing. Denies chest pain/ discomfort  Procal and BNP not elevated, Viral panel negative  CXR negative  Robitussin prn     BRAXTON on CKD - pre renal vs ATN  Cr 4.02 -> 3.79 -> 3.17, baseline appears to be 2.65  UA No casts on UA  US Kidney no evidence of hydronephrosis  Seen by Nephrology, appreciate recommendations  Resumed PTA Lasix 05/11  Losartan on hold, will resume tomorrow if Cr stable  Received IV Albumin 50gm 05/09 and 05/10  Monitor Cr    Abnormal UA  Hx recurrent UTI  UA turbid, proteinuria, + , leukocyte esterase with 2 squamous cells - ? contaminant.  No casts on UA  Denies  symptoms  Will discontinue antibiotics     Normocytic anemia  Hb 10.8 -> 9.6 -> 8.9, likely 2/2 CKD  Suspect acute drop due to hypervolemia  No evidence of bleeding  Iron studies consistent with ACD  Monitor Hb, on Lasix for diuresis     CAD  Cath with moderate disease in LAD and Lcx, severe disease in small RCA; mildly elevated L-sided filling pressures   aspirin 81 mg, beta-blocker, atorvastatin, Imdur  Follow up outpatient with Cardiology     History of hypertension  Monitor vital signs as per protocol  Continue with amlodipine, metoprolol, Imdur  Resumed PTA Lasix 05/11  Losartan on hold     History of hyperlipidemia  Changed Rosuvastatin to Atorvastatin 80mg due to CrCl     History of CHF  Resumed PTA Lasix, hypervolemic on exam  Monitor volume status closely  Continue with Imdur     History of hypothyroidism  Continue with levothyroxine     History of insulin-dependent type 2 diabetes mellitus  HbA1c 7.1  on Lantus 54 units at home  Blood sugars running in the lower range, likely due to moderate carb diet in the hospital  Patient took 15 units of NovoLog from her home insulin, RN notified - 05/08  Will monitor blood sugars closely  Inc Tresiba 38 units, high sliding scale insulin (will adjust doses with BRAXTON)  Will likely need home dose on discharge     History of mood disorder/anxiety/panic attacks  As needed Ativan, hydroxyzine     History of asthma not in exacerbation  Continue with bronchodilators as needed and appropriate          Diet: Resume pre-procedure diet  Diet  Moderate Consistent Carb (60 g CHO per Meal) Diet    DVT Prophylaxis: Heparin SQ  Rao Catheter: Not present  Lines: None     Cardiac Monitoring: None  Code Status: Full Code      Clinically Significant Risk Factors          # Hypocalcemia: Lowest Ca = 8.2 mg/dL in last 2 days, will monitor and replace as appropriate       # Thrombocytopenia: Lowest platelets = 124 in last 2 days, will monitor for bleeding   # Hypertension: Noted on problem  "list       # DMII: A1C = 7.1 % (Ref range: <5.7 %) within past 6 months   # Obesity: Estimated body mass index is 34.68 kg/m  as calculated from the following:    Height as of 4/18/24: 1.6 m (5' 3\").    Weight as of this encounter: 88.8 kg (195 lb 12.8 oz).             Disposition Plan     Medically Ready for Discharge: Anticipated Tomorrow             Liza Mariano MD  Hospitalist Service  [facility]  Securely message with [employer] (more info)  Text page via [facility] Paging/Directory   ______________________________________________________________________    Interval History   Patient was examined at the bedside, states she feels better.  Requiring oxygen during sleeping.  Appears hypovolemic on exam, restarted diuretics after discussing with nephrology.  Patient was tearful, states she is overwhelmed with a number of things that she is going through    Physical Exam   Vital Signs: Temp: 97.9  F (36.6  C) Temp src: Oral BP: (!) 156/67 Pulse: 69   Resp: 18 SpO2: 92 % O2 Device: None (Room air) Oxygen Delivery: 1 LPM  Weight: 195 lbs 12.8 oz    GENERAL: Patient was seen and examined at bedside with no acute distress, off supplemental oxygen daytime  LUNGS: Bilateral air entry, mild crackles b/l bases  CARDIOVASCULAR:  Regular rate and rhythm with no murmurs, gallops or rubs.  ABDOMEN:  Normal bowel sounds. Soft; nontender   EXT: +1 pitting edema b/l upto shins  SKIN:  No acute rashes.   NEUROLOGIC:  Grossly nonfocal    Medical Decision Making       55 MINUTES SPENT BY ME on the date of service doing chart review, history, exam, documentation & further activities per the note.      Data     I have personally reviewed the following data over the past 24 hrs:    5.7  \   8.9 (L)   / 132 (L)     136 100 77.7 (H) /  188 (H)   4.0 22 3.17 (H) \     ALT: N/A AST: N/A AP: N/A TBILI: N/A   ALB: 4.3 TOT PROTEIN: N/A LIPASE: N/A       Imaging results reviewed over the past 24 hrs:   No results found for " this or any previous visit (from the past 24 hour(s)).

## 2024-05-11 NOTE — PLAN OF CARE
Problem: Adult Inpatient Plan of Care  Goal: Plan of Care Review  Description: The Plan of Care Review/Shift note should be completed every shift.  The Outcome Evaluation is a brief statement about your assessment that the patient is improving, declining, or no change.  This information will be displayed automatically on your shift  note.  Outcome: Progressing     Problem: Surgery Nonspecified  Goal: Effective Bowel Elimination  Outcome: Progressing  Intervention: Enhance Bowel Motility and Elimination  Recent Flowsheet Documentation  Taken 5/11/2024 [id number] by Ghislaine Cabrera RN  Bowel Elimination Management: (miralax and senna) other (see comments)     Problem: Surgery Nonspecified  Goal: Effective Oxygenation and Ventilation  Outcome: Progressing   Goal Outcome Evaluation:  Patient still experiencing periods of feeling SOB. Onc and Hospitalist aware. Unsure about discharging home today.   Surgical wounds SERGE. Patient has been wearing pad for comfort and occasional slight incontinence.

## 2024-05-11 NOTE — PROGRESS NOTES
GYN/ONC PROGRESS NOTE    cc: POD# 4 s/p Modified radical vulvectomy, [place] lymph node injection and bilateral inguinal lymph node dissection    HPI: Feeling well today, eating breakfast. Pain well controlled with Tylenol and prn Oxy. Ambulating halls independently. Passing gas but no BM since Monday, feels the urge to go. Notes some nausea this AM, not had any bowel meds in two days. Increased fluid intake yesterday. Requiring intermittent 1L O2. No fever/ cough/ dysuria. Urine color now dark yellow.     EXAM:        Intake/Output Summary (Last 24 hours) at 5/11/2024 0759  Last data filed at 5/11/2024 0600  Gross per 24 hour   Intake 200 ml   Output 2100 ml   Net -1900 ml           U/O: 2100 ml/ 24 hours  Drains: N/A    BP (!) 142/65 (BP Location: Right arm)   Pulse 72   Temp 98.3  F (36.8  C) (Oral)   Resp 18   Wt 88.8 kg (195 lb 12.8 oz)   SpO2 92%   BMI 34.68 kg/m      GENERAL: alert, NAD, obese  GROIN: bilateral incisions c/d/i with surrounding ecchymosis, no edema, mildly tender  : limited exam, no drainage on pad  EXTREMITY: mild bilateral pitting edema to mid-shin  SKIN: warm and dry, no jaundice, no rashes      LABS  Recent Labs   Lab Test 05/08/24  0539 04/18/24  0755   WBC 9.5 6.6   RBC 3.60* 4.18   HGB 10.8* 12.7   HCT 32.7* 37.3   MCV 91 89   MCH 30.0 30.4   MCHC 33.0 34.0    189       Recent Labs   Lab Test 05/08/24  0539 04/18/24  0755   POTASSIUM 4.4 4.5   CHLORIDE 99 100   BUN 61.8* 59.9*       Recent Labs   Lab Test 11/13/23  1140 11/29/22  0640 08/16/20 2017 08/16/20  1514   PROTEIN  --   --   --  200 mg/dL*   BILITOTAL 0.7 0.5   < >  --    AST 19 26   < >  --    ALT 14 18   < >  --     < > = values in this interval not displayed.       IMAGING  EXAM: CT CHEST W/O CONTRAST  LOCATION: [facility]  DATE: 5/9/2024        FINDINGS:   LUNGS AND PLEURA: Mucous in the trachea. Strand of atelectasis right lower lobe.      MEDIASTINUM/AXILLAE: Calcified mitral  annulus.      CORONARY ARTERY CALCIFICATION: Mild calcified atherosclerosis left anterior descending coronary artery.      UPPER ABDOMEN: Moderate atrophy upper pole right kidney. Cholecystectomy.      MUSCULOSKELETAL: Moderate degenerative change thoracic spine.                                                                       IMPRESSION:   No findings to account for new hypoxia. No evidence of emphysema.       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 She is s/p modified radical vulvectomy with SNL injection and bilateral inguinal lymph node dissection on 5/7/24.    PLAN:  Routine post-op cares, overall pt doing well. Previously reviewed procedure and operative findings with patient. Apply Silvadene cream to vulvar incision BID. Final pathology still pending.   Hospitalist consulted for management of co morbidities, appreciate input.     Oncology: Final pathology pending.    Pain: Continue scheduled Tylenol and PRN Oxycodone. IV Dilaudid available prn. Avoid NSAIDS due to hx of CKD. Ice packs PRN.     PULM: Chronic Hypoxia  CXR normal. CT Chest negative.   Wean off O2 as able, possible sleep apnea contributing. ?URI. Hx of asthma. Past smoker. Continue frequent IS.   Off oxygen at daytime, sleep clinic at discharge for suspected JAMAR    CARD: sinus bradycardia after surgery, currently on tele monitoring. Now improved. Goal for BP control at discharge. CAD on ASA 81 mg, BB, atorvastatin, Imdur, follow up with outpatient cardiology. Hx of CHF, no evidence of acute exacerbation at this time, holding Lasix per neph.     GI: continue low carb diet. Last BM 5 days ago, Added Senna-S BID. Two tablets this AM. Added Miralax for today.     : Voiding, reviewed use of sparkle-bottle after voiding to help keep incision clean. Ok to wear pad, change as needed. UA + WBC LE with likely contaminant, asymptomatic, Abx now  discontinued., pending Cx.     Renal: BRAXTON on CKD- Cr improved to 3.17 from >3.79> from 4.02 (baseline prior to surgery around 2.6-3).   Nephrology following, appreciate recommendations.   Renal US with atrophy of superior half of R kidney, no hydro. s/p IV albumin, holding lasix and losartan.   UA suspicious, started on Cipro, now discontinued. Turquoise urine, ?potentially related to medication vs infection.    Encouraged increased hydration.   Per neph has outpatient follow up with Dr. Ott.     DVT prophylaxis: SCD's while in bed. Lovenox 30mg subcutaneous daily while inpatient. Defer change to heparin for VTE prophylaxis to Hospitalist. Ambulation encouraged 4x/daily.    ID:  Afebrile. Monitor. Plan for prophylactic antibiotics post-operatively. Rx for doxycycline sent to pharmacy. May need to ye abx pending Ucx results, still pending.     Heme: 8.9  today from > 9.6 >  10.8. No evidence of bleeding. Monitor daily. Asymptomatic. VSS. Will have repeat Hgb at follow up appointment in one week.     S/p PT consult, no home needs.        Dispo: Ready to discharge from surgical standpoint, would appreciate final recs from nephrology and medicine for discharge.     Yasmany Odonnell PA-C on 5/11/2024 at 8:04 AM

## 2024-05-11 NOTE — PROGRESS NOTES
NEPHROLOGY PROGRESS NOTE    Date of service: 05/10/24     CC: BRAXTON, CKD      ASSESSMENT/RECOMMENDATIONS:  67yo woman with CKD from DM, HTN admit for valvulectomy for SCC and now with BRAXTON.     1.BRAXTON: pre-renal vs ATN post procedure. No IV contrast, no NSAIDS, no documented hypotension.UA contaminated, ?UTI. Renal US without hydro,              -Holding lasix and losartan              -Got IV albumin 50 grams IV yesterday and Scr better today, okay to restart diuretic.  From renal standpoint, can discharge if Scr stable and breathing is better tomorrow.              -No indication for HD              -Daily renal function labs, wts                2. CKD 4: CKD due to longstanding DM2, also with diabetic retinopathy and HTN, also with ureteral reflux and required partial rt nephrectomy in '80s and did have very frequent UTI in past. Workup with rt renal atrophy and echogenic kidneys on ultrasound, 2.6 gm proteinuria, no paraproteins. Has outpt follow up with Dr. Ott.Her creatinine had been in the range of 2.6-3mg/dl,       3. Hypertension: BP is at goal.              --Okay to restart diuretic as noted above, okay to restart ARB tomorrow if scr stable. Trend.        4. Anemia: mild. New after surgery. Trend per primary.    Antoine Byers, DNP, CNP, KAYCE  [facility]  Pager: [phone]      S: Since last seen by our team, Scr 3.17(3.79), sodium normal.  K and bicarb okay.  Admits to some SOB and also bilateral leg edema.  No chest pain, dizziness, n/v, abd pain, fever/chills.      Current Facility-Administered Medications   Medication Dose Route Frequency Provider Last Rate Last Admin    acetaminophen (TYLENOL) tablet 975 mg  975 mg Oral Q6H Marguerite Bhatia PA-C   975 mg at 05/11/24 0951    albuterol (PROVENTIL HFA/VENTOLIN HFA) inhaler  2 puff Inhalation Q4H PRN Marguerite Bhatia PA-C   2 puff at 05/09/24 2120    amLODIPine (NORVASC) tablet 5 mg  5 mg Oral Daily Sriram  Marguerite Anglin PA-C   5 mg at 05/11/24 0900    atorvastatin (LIPITOR) tablet 80 mg  80 mg Oral QPM Liza Mariano MD   80 mg at 05/10/24 2058    calcium carbonate (TUMS) chewable tablet 500 mg  500 mg Oral 4x Daily PRN Marguerite Bhatia PA-C        glucose gel 15-30 g  15-30 g Oral Q15 Min PRN Marguerite Bhatia PA-C        Or    dextrose 50 % injection 25-50 mL  25-50 mL Intravenous Q15 Min PRN Marguerite Bhatia PA-C        Or    glucagon injection 1 mg  1 mg Subcutaneous Q15 Min PRN Marguerite Bhatia PA-C        fluticasone-vilanterol (BREO ELLIPTA) 100-25 MCG/ACT inhaler 1 puff  1 puff Inhalation Daily Marguerite Bhatia PA-C   1 puff at 05/10/24 2100    furosemide (LASIX) tablet 40 mg  40 mg Oral QPM Liza Mariano MD   40 mg at 05/08/24 1633    furosemide (LASIX) tablet 80 mg  80 mg Oral QAM Liza Mariano MD        guaiFENesin (ROBITUSSIN) 20 mg/mL solution 200 mg  200 mg Oral Q4H PRN Liza Mariano MD        heparin ANTICOAGULANT injection 5,000 Units  5,000 Units Subcutaneous Q8H Liza Mariano MD   5,000 Units at 05/11/24 0901    HYDROmorphone (DILAUDID) injection 0.1 mg  0.1 mg Intravenous Q2H PRN Marguerite Bhatia PA-C        Or    HYDROmorphone (DILAUDID) injection 0.2 mg  0.2 mg Intravenous Q2H PRN Marguerite Bhatia PA-C        hydrOXYzine HCl (ATARAX) tablet 10 mg  10 mg Oral Q6H PRN Marguerite Bhatia PA-C        insulin aspart (NovoLOG) injection (RAPID ACTING)  1-10 Units Subcutaneous TID AC Liza Mariano MD   3 Units at 05/11/24 0900    insulin aspart (NovoLOG) injection (RAPID ACTING)  1-7 Units Subcutaneous At Bedtime Liza Mariano MD   3 Units at 05/10/24 2108    insulin degludec (TRESIBA) 100 UNIT/ML injection 34 Units  34 Units Subcutaneous At Bedtime Liza Mariano MD   34 Units at 05/10/24 2103    isosorbide mononitrate (IMDUR) 24 hr tablet 30 mg  30 mg Oral Daily Marguerite Bhatia PA-C   30 mg at 05/10/24 2057    levothyroxine  (SYNTHROID/LEVOTHROID) tablet 100 mcg  100 mcg Oral Daily Marguerite Bhatia PA-C   100 mcg at 05/11/24 0859    lidocaine (LMX4) cream   Topical Q1H PRN Marguerite Bhatia PA-C        lidocaine 1 % 0.1-1 mL  0.1-1 mL Other Q1H PRN Marguerite Bhatia PA-C        LORazepam (ATIVAN) tablet 0.5 mg  0.5 mg Oral Q6H PRN Marguerite Bhatia PA-C        [Held by provider] losartan (COZAAR) tablet 50 mg  50 mg Oral Daily Marguerite Bhatia PA-C   50 mg at 05/08/24 0830    metoprolol tartrate (LOPRESSOR) tablet 100 mg  100 mg Oral BID Marguerite Bhatia PA-C   100 mg at 05/11/24 0858    naloxone (NARCAN) injection 0.2 mg  0.2 mg Intravenous Q2 Min PRN Marguerite Bhatia PA-C        Or    naloxone (NARCAN) injection 0.4 mg  0.4 mg Intravenous Q2 Min PRN Marguerite Bhatia PA-C        Or    naloxone (NARCAN) injection 0.2 mg  0.2 mg Intramuscular Q2 Min PRN Marguerite Bhatia PA-C        Or    naloxone (NARCAN) injection 0.4 mg  0.4 mg Intramuscular Q2 Min PRN Marguerite Bhatia PA-C        ondansetron (ZOFRAN ODT) ODT tab 4 mg  4 mg Oral Q6H PRN Marguerite Bhatia PA-C        Or    ondansetron (ZOFRAN) injection 4 mg  4 mg Intravenous Q6H PRN Marguerite Bhatia PA-C        oxyCODONE IR (ROXICODONE) half-tab 2.5 mg  2.5 mg Oral Q4H PRN Marguerite Bhatia PA-C   2.5 mg at 05/10/24 0926    Or    oxyCODONE (ROXICODONE) tablet 5 mg  5 mg Oral Q4H PRN Marguerite Bhatia PA-C   5 mg at 05/07/24 1935    pantoprazole (PROTONIX) EC tablet 40 mg  40 mg Oral QAM AC Marguerite Bhatia PA-C   40 mg at 05/11/24 0859    polyethylene glycol (MIRALAX) Packet 17 g  17 g Oral Daily Yasmany Odonnell PA-C   17 g at 05/11/24 0906    polyethylene glycol (MIRALAX) Packet 17 g  17 g Oral Daily PRN Liza Mariano MD   17 g at 05/08/24 1434    prochlorperazine (COMPAZINE) injection 5 mg  5 mg Intravenous Q6H PRN Marguerite Bhatia PA-C        Or    prochlorperazine (COMPAZINE) tablet 5 mg  5 mg Oral  Q6H PRN Marguerite Bhatia PA-C        senna-docusate (SENOKOT-S/PERICOLACE) 8.6-50 MG per tablet 1-2 tablet  1-2 tablet Oral BID Yasmany Odonnell PA-C   2 tablet at 05/11/24 0906    silver sulfADIAZINE (SILVADENE) 1 % cream   Topical BID Olya Cuevas PA-C   Given at 05/11/24 0903    sodium chloride (PF) 0.9% PF flush 3 mL  3 mL Intracatheter Q8H Marguerite Bhatia PA-C   3 mL at 05/10/24 2102    sodium chloride (PF) 0.9% PF flush 3 mL  3 mL Intracatheter q1 min prn Marguerite Bhatia PA-C            No interval change in SH, FH.    Physical Exam  BP (!) 142/65 (BP Location: Right arm)   Pulse 72   Temp 98.3  F (36.8  C) (Oral)   Resp 18   Wt 88.8 kg (195 lb 12.8 oz)   SpO2 92%   BMI 34.68 kg/m    GENERAL: NAD  HEENT: NCAT, pupils equal, sclerae not icteric, MMM  NECK: Supple.Trachea midline.   LUNGS: no respiratory distress, on oxygen 1.5L per NC.  HEART:  + leg edema.   ABDOMEN: Soft, NT. obese  PSYCH: Alert, normal affect  NEURO:  moves all extremities  MUSC/SKEL:no joint effusions evident  SKIN: No rash       Lab Data:  Recent Labs   Lab Test 05/11/24  0615 05/10/24  0705 05/08/24  0539   POTASSIUM 4.0 4.3 4.4   CHLORIDE 100 99 99   ALBUMIN 4.3 3.8  --      Recent Labs   Lab Test 05/11/24  0615 05/10/24  0705 05/08/24  0539 04/18/24  0755   BUN 77.7* 88.2* 61.8* 59.9*     Recent Labs   Lab Test 05/11/24  0615 05/10/24  0705 05/08/24  0539 08/21/20  1048 08/20/20  1026 08/20/20  1025 08/19/20  1407   WBC 5.7 5.9 9.5   < >  --    < > 4.7   HGB 8.9* 9.6* 10.8*   < >  --    < > 12.5   MCV 90 95 91   < >  --    < > 89   * 124* 158   < >  --    < > 173   IRON  --   --  87  --   --   --   --    IRONSAT  --   --  40  --   --   --   --    LDH  --   --   --   --  169  --  173    < > = values in this interval not displayed.     Recent Labs   Lab Test 11/13/23  1140 12/06/22  1135   TSH 1.14 3.09       I reviewed the above labs

## 2024-05-12 VITALS
SYSTOLIC BLOOD PRESSURE: 178 MMHG | DIASTOLIC BLOOD PRESSURE: 81 MMHG | TEMPERATURE: 98 F | OXYGEN SATURATION: 93 % | WEIGHT: 195.8 LBS | RESPIRATION RATE: 20 BRPM | HEART RATE: 71 BPM | BODY MASS INDEX: 34.68 KG/M2

## 2024-05-12 PROBLEM — Z13.9 SCREENING FOR CONDITION: Chronic | Status: ACTIVE | Noted: 2024-05-12

## 2024-05-12 LAB
ANION GAP SERPL CALCULATED.3IONS-SCNC: 12 MMOL/L (ref 7–15)
BUN SERPL-MCNC: 64.4 MG/DL (ref 8–23)
CALCIUM SERPL-MCNC: 9.4 MG/DL (ref 8.8–10.2)
CHLORIDE SERPL-SCNC: 102 MMOL/L (ref 98–107)
CREAT SERPL-MCNC: 2.74 MG/DL (ref 0.51–0.95)
DEPRECATED HCO3 PLAS-SCNC: 23 MMOL/L (ref 22–29)
EGFRCR SERPLBLD CKD-EPI 2021: 18 ML/MIN/1.73M2
GLUCOSE BLDC GLUCOMTR-MCNC: 188 MG/DL (ref 70–99)
GLUCOSE BLDC GLUCOMTR-MCNC: 206 MG/DL (ref 70–99)
GLUCOSE SERPL-MCNC: 201 MG/DL (ref 70–99)
HGB BLD-MCNC: 10.7 G/DL (ref 11.7–15.7)
POTASSIUM SERPL-SCNC: 4.2 MMOL/L (ref 3.4–5.3)
SODIUM SERPL-SCNC: 137 MMOL/L (ref 135–145)

## 2024-05-12 PROCEDURE — 250N000013 HC RX MED GY IP 250 OP 250 PS 637

## 2024-05-12 PROCEDURE — 80048 BASIC METABOLIC PNL TOTAL CA: CPT | Performed by: STUDENT IN AN ORGANIZED HEALTH CARE EDUCATION/TRAINING PROGRAM

## 2024-05-12 PROCEDURE — 36415 COLL VENOUS BLD VENIPUNCTURE: CPT

## 2024-05-12 PROCEDURE — 250N000013 HC RX MED GY IP 250 OP 250 PS 637: Performed by: PHYSICIAN ASSISTANT

## 2024-05-12 PROCEDURE — 250N000013 HC RX MED GY IP 250 OP 250 PS 637: Performed by: STUDENT IN AN ORGANIZED HEALTH CARE EDUCATION/TRAINING PROGRAM

## 2024-05-12 PROCEDURE — 85018 HEMOGLOBIN: CPT

## 2024-05-12 PROCEDURE — 99232 SBSQ HOSP IP/OBS MODERATE 35: CPT | Performed by: STUDENT IN AN ORGANIZED HEALTH CARE EDUCATION/TRAINING PROGRAM

## 2024-05-12 PROCEDURE — 250N000011 HC RX IP 250 OP 636: Performed by: STUDENT IN AN ORGANIZED HEALTH CARE EDUCATION/TRAINING PROGRAM

## 2024-05-12 RX ORDER — LOSARTAN POTASSIUM 50 MG/1
50 TABLET ORAL DAILY
Status: DISCONTINUED | OUTPATIENT
Start: 2024-05-12 | End: 2024-05-12 | Stop reason: HOSPADM

## 2024-05-12 RX ADMIN — POLYETHYLENE GLYCOL 3350 17 G: 17 POWDER, FOR SOLUTION ORAL at 08:01

## 2024-05-12 RX ADMIN — SENNOSIDES AND DOCUSATE SODIUM 2 TABLET: 50; 8.6 TABLET ORAL at 08:01

## 2024-05-12 RX ADMIN — ACETAMINOPHEN 975 MG: 325 TABLET ORAL at 05:40

## 2024-05-12 RX ADMIN — LEVOTHYROXINE SODIUM 100 MCG: 100 TABLET ORAL at 07:59

## 2024-05-12 RX ADMIN — PANTOPRAZOLE SODIUM 40 MG: 40 TABLET, DELAYED RELEASE ORAL at 07:57

## 2024-05-12 RX ADMIN — SILVER SULFADIAZINE: 10 CREAM TOPICAL at 08:03

## 2024-05-12 RX ADMIN — METOPROLOL TARTRATE 100 MG: 25 TABLET, FILM COATED ORAL at 07:58

## 2024-05-12 RX ADMIN — LOSARTAN POTASSIUM 50 MG: 50 TABLET, FILM COATED ORAL at 10:09

## 2024-05-12 RX ADMIN — FUROSEMIDE 80 MG: 20 TABLET ORAL at 07:57

## 2024-05-12 RX ADMIN — HEPARIN SODIUM 5000 UNITS: 10000 INJECTION, SOLUTION INTRAVENOUS; SUBCUTANEOUS at 07:56

## 2024-05-12 RX ADMIN — AMLODIPINE BESYLATE 5 MG: 5 TABLET ORAL at 07:57

## 2024-05-12 ASSESSMENT — ACTIVITIES OF DAILY LIVING (ADL)
ADLS_ACUITY_SCORE: 22

## 2024-05-12 NOTE — PROGRESS NOTES
GYN/ONC PROGRESS NOTE    cc: POD# 5 s/p Modified radical vulvectomy, [place] lymph node injection and bilateral inguinal lymph node dissection    HPI: no new symptoms. Worried about BP just taken with Diastolic 100. Pending recheck.    EXAM:        Intake/Output Summary (Last 24 hours) at 5/11/2024 0759  Last data filed at 5/11/2024 0600  Gross per 24 hour   Intake 200 ml   Output 2100 ml   Net -1900 ml         BP (!) 140/63 (BP Location: Left arm)   Pulse 71   Temp 98  F (36.7  C) (Oral)   Resp 20   Wt 88.8 kg (195 lb 12.8 oz)   SpO2 93%   BMI 34.68 kg/m      GENERAL: NAD  GROIN: bilateral incisions c/d/i with surrounding minimal swelling  : limited exam, no drainage on pad  EXTREMITY: mild bilateral edema        LABS  Recent Labs   Lab Test 05/08/24  0539 04/18/24  0755   WBC 9.5 6.6   RBC 3.60* 4.18   HGB 10.8* 12.7   HCT 32.7* 37.3   MCV 91 89   MCH 30.0 30.4   MCHC 33.0 34.0    189       Recent Labs   Lab Test 05/08/24  0539 04/18/24  0755   POTASSIUM 4.4 4.5   CHLORIDE 99 100   BUN 61.8* 59.9*       Recent Labs   Lab Test 11/13/23  1140 11/29/22  0640 08/16/20 2017 08/16/20  1514   PROTEIN  --   --   --  200 mg/dL*   BILITOTAL 0.7 0.5   < >  --    AST 19 26   < >  --    ALT 14 18   < >  --     < > = values in this interval not displayed.         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 She is s/p modified radical vulvectomy with SNL injection and bilateral inguinal lymph node dissection on 5/7/24.    PLAN:  Routine post-op cares, overall pt doing well. Previously reviewed procedure and operative findings with patient. Apply Silvadene cream to vulvar incision BID. Final pathology still pending.   Hospitalist consulted for management of co morbidities, appreciate input.     Oncology: Final pathology pending.    Pain: Continue scheduled Tylenol and PRN Oxycodone. IV Dilaudid available prn. Avoid  NSAIDS due to hx of CKD. Ice packs PRN.     PULM: Chronic Hypoxia  CXR normal. CT Chest negative.   Wean off O2 as able, possible sleep apnea contributing. ?URI. Hx of asthma. Past smoker. Continue frequent IS.   Off oxygen at daytime, sleep clinic at discharge for suspected JAMAR    CARD: sinus bradycardia after surgery, currently on tele monitoring. Now improved. Goal for BP control at discharge. CAD on ASA 81 mg, BB, atorvastatin, Imdur, follow up with outpatient cardiology. Hx of CHF, no evidence of acute exacerbation at this time, holding Lasix per neph.     GI: continue low carb diet. Last BM 5 days ago, Added Senna-S BID. Two tablets this AM. Added Miralax for today.     : Voiding, reviewed use of sparkle-bottle after voiding to help keep incision clean. Ok to wear pad, change as needed. UA + WBC LE with likely contaminant, asymptomatic, Abx now discontinued., pending Cx.     Renal: BRAXTON on CKD- Cr improved to 3.17 yesterday from >3.79> from 4.02 (baseline prior to surgery around 2.6-3). Pending today  Nephrology following, appreciate recommendations.   Renal US with atrophy of superior half of R kidney, no hydro. s/p IV albumin, holding lasix and losartan.   UA suspicious, started on Cipro, now discontinued. Turquoise urine, ?potentially related to medication vs infection.    Encouraged increased hydration.   Per neph has outpatient follow up with Dr. Ott.     DVT prophylaxis: SCD's while in bed. Lovenox 30mg subcutaneous daily while inpatient. Defer change to heparin for VTE prophylaxis to Hospitalist. Ambulation encouraged 4x/daily.    ID:  Afebrile. Monitor. Plan for prophylactic antibiotics post-operatively. Rx for doxycycline sent to pharmacy. May need to ye abx pending Ucx results, still pending.     Heme: 8.9 yesterday from > 9.6 >  10.8. No evidence of bleeding. Monitor daily. Asymptomatic. VSS. Will have repeat Hgb at follow up appointment in one week. Pending today    S/p PT consult, no home  needs.        Dispo: Ready to discharge from surgical standpoint, If Hgb and Cr stable should be able to go today    Hansa Watson MD on 5/11/2024 at 8:04 AM      HGB 10 and Cr 2.74 OK to discharge  Hansa Watson M.D.  Gynecologic Oncology  [phone]

## 2024-05-12 NOTE — PLAN OF CARE
Problem: Adult Inpatient Plan of Care  Goal: Plan of Care Review  Description: The Plan of Care Review/Shift note should be completed every shift.  The Outcome Evaluation is a brief statement about your assessment that the patient is improving, declining, or no change.  This information will be displayed automatically on your shift  note.  Outcome: Met     Problem: Adult Inpatient Plan of Care  Goal: Optimal Comfort and Wellbeing  Outcome: Met     Problem: Surgery Nonspecified  Goal: Effective Oxygenation and Ventilation  Outcome: Met   Goal Outcome Evaluation:       Patient discharging home with son transporting. Denies SOB today. Minimal groin discomfort reported. Given Tylenol. Good appetite. Instructions reviewed and given to pt. BP-178/74 reported to Gyn/onc this am. NNO. Home medications held in pharmacy also sent  home with pt.

## 2024-05-12 NOTE — PROGRESS NOTES
[facility]    Medicine Progress Note - Hospitalist Service    Date of Admission:  5/7/2024    Assessment & Plan   Mariama Keene is a 68 year old female admitted on 5/7/2024. She presented in early March with vaginal irritation and soreness.  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  HPV negative.  Patient presented today for modified radical valvulectomy with sentinel lymph node injection and bilateral inguinal lymph node dissection and is s/p procedure, medicine was consulted for medical comanagement.     Squamous cell carcinoma of the vulva  Presented for planned modified radical valvulectomy, sentinel lymph node injection and bilateral inguinal lymph node dissection and is s/p procedure  Follow-up gynecological oncology for further recommendations  Pain management as per protocol     Chronic Hypoxia, suspect chronic underlying JAMAR/OHS  Off oxygen during daytime and awake, requiring 1-2 L NC at night  Vitals stable, low suspicion of PE  CT Chest negative, prior smoker quit 1992, no evidence of emphysema  Sleep clinic at discharge for suspected JAMAR     URI  C/o cough, sneezing and mild discomfort with deep breathing  No wheezing. Denies chest pain/ discomfort  Procal and BNP not elevated, Viral panel negative  CXR negative  Robitussin prn     BRAXTON on CKD - pre renal vs ATN  Cr 4.02 -> 3.79 -> 3.17 -> 2.74, baseline appears to be 2.65  UA No casts on UA  US Kidney no evidence of hydronephrosis  Seen by Nephrology, appreciate recommendations  Resumed PTA Lasix 05/11  Received IV Albumin 50gm 05/09 and 05/10  Resumed Losartan 05/12  Monitor Cr     Abnormal UA  Hx recurrent UTI  UA turbid, proteinuria, + , leukocyte esterase with 2 squamous cells - ? contaminant.  No casts on UA  Denies symptoms  Will discontinue  "antibiotics     Normocytic anemia  Hb 10.8 -> 8.9, improved 10.7 s/p diuretics  Suspect acute drop due to hypervolemia  No evidence of bleeding  Iron studies consistent with ACD  Monitor outpatient     CAD  Cath with moderate disease in LAD and Lcx, severe disease in small RCA; mildly elevated L-sided filling pressures   aspirin 81 mg, beta-blocker, atorvastatin, Imdur     History of hypertension  Monitor vital signs as per protocol  Continue with amlodipine, metoprolol, Imdur  PTA Lasix 05/11, resumed Losartan 05/12  Follow up with PCP     History of hyperlipidemia  Change Rosuvastatin to Atorvastatin 80mg due to CrCl     History of CHF  PTA Lasix  Continue with Imdur     History of hypothyroidism  Continue with levothyroxine     History of insulin-dependent type 2 diabetes mellitus  HbA1c 7.1  on Lantus 54 units at home  Blood sugars running in the lower range, likely due to moderate carb diet in the hospital  Home Insulin at discharge     History of mood disorder/anxiety/panic attacks  As needed Ativan, hydroxyzine     History of asthma not in exacerbation  Continue with bronchodilators as needed and appropriate          Diet: Resume pre-procedure diet  Diet    DVT Prophylaxis: ambulate  Rao Catheter: Not present  Lines: None     Cardiac Monitoring: None  Code Status:  Full code    Clinically Significant Risk Factors                  # Hypertension: Noted on problem list       # DMII: A1C = 7.1 % (Ref range: <5.7 %) within past 6 months   # Obesity: Estimated body mass index is 34.68 kg/m  as calculated from the following:    Height as of 4/18/24: 1.6 m (5' 3\").    Weight as of this encounter: 88.8 kg (195 lb 12.8 oz).             Disposition Plan     Medically Ready for Discharge:              Liza Mariano MD  Hospitalist Service  [facility]  Securely message with [employer] (more info)  Text page via [employer] Paging/Directory "   ______________________________________________________________________    Interval History   Patient was examined at the bedside, states she feels well.  Ready for discharge today    Physical Exam   Vital Signs: Temp: 98  F (36.7  C) Temp src: Oral BP: (!) 178/81 Pulse: 71   Resp: 20 SpO2: 93 % O2 Device: Nasal cannula Oxygen Delivery: 1 LPM  Weight: 195 lbs 12.8 oz    GENERAL: Patient was seen and examined at bedside with no acute distress, off supplemental oxygen daytime  LUNGS: Bilateral air entry, no crackles or wheezes  CARDIOVASCULAR:  Regular rate and rhythm with no murmurs, gallops or rubs.  ABDOMEN:  Normal bowel sounds. Soft; nontender   EXT: trace b/l edema peripherally  SKIN:  No acute rashes.   NEUROLOGIC:  Grossly nonfocal    Medical Decision Making       45 MINUTES SPENT BY ME on the date of service doing chart review, history, exam, documentation & further activities per the note.      Data     I have personally reviewed the following data over the past 24 hrs:    N/A  \   10.7 (L)   / N/A     137 102 64.4 (H) /  201 (H)   4.2 23 2.74 (H) \       Imaging results reviewed over the past 24 hrs:   No results found for this or any previous visit (from the past 24 hour(s)).

## 2024-05-12 NOTE — PLAN OF CARE
"Patient is alert and oriented times 4. Patient expressed desire to be discharged soon. Patient walked in hallway. Patient wearing a pad overnight.     Kecia Lockett RN      Problem: Adult Inpatient Plan of Care  Goal: Plan of Care Review  Description: The Plan of Care Review/Shift note should be completed every shift.  The Outcome Evaluation is a brief statement about your assessment that the patient is improving, declining, or no change.  This information will be displayed automatically on your shift  note.  Outcome: Progressing  Goal: Patient-Specific Goal (Individualized)  Description: You can add care plan individualizations to a care plan. Examples of Individualization might be:  \"Parent requests to be called daily at 9am for status\", \"I have a hard time hearing out of my right ear\", or \"Do not touch me to wake me up as it startles  me\".  Outcome: Progressing  Goal: Absence of Hospital-Acquired Illness or Injury  Outcome: Progressing  Intervention: Identify and Manage Fall Risk  Recent Flowsheet Documentation  Taken 5/11/2024 1551 by Kecia Lockett, RN  Safety Promotion/Fall Prevention:   clutter free environment maintained   room near nurse's station   nonskid shoes/slippers when out of bed  Intervention: Prevent Skin Injury  Recent Flowsheet Documentation  Taken 5/11/2024 1551 by Kecia Lockett, RN  Body Position: position changed independently  Goal: Optimal Comfort and Wellbeing  Outcome: Progressing  Goal: Readiness for Transition of Care  Outcome: Progressing   Goal Outcome Evaluation:                        "

## 2024-05-12 NOTE — PLAN OF CARE
Problem: Adult Inpatient Plan of Care  Goal: Plan of Care Review  Description: The Plan of Care Review/Shift note should be completed every shift.  The Outcome Evaluation is a brief statement about your assessment that the patient is improving, declining, or no change.  This information will be displayed automatically on your shift  note.  5/12/2024 0509 by Taylor Day RN  Outcome: Progressing  5/12/2024 0503 by Taylor Day RN  Outcome: Progressing     Problem: Adult Inpatient Plan of Care  Goal: Readiness for Transition of Care  5/12/2024 0509 by Taylor Day RN  Outcome: Progressing  5/12/2024 0503 by Taylor Day RN  Outcome: Progressing   Goal Outcome Evaluation:       Pt is alert and oriented x4. On 1 L of Oxygen. Denies pain. Refused schedule Tylenol. VSS. PIV saline locked. On 60 gr carb diet. Independent in her room. Slept well the night.

## 2024-05-16 ENCOUNTER — TELEPHONE (OUTPATIENT)
Dept: CARDIOLOGY | Facility: CLINIC | Age: 69
End: 2024-05-16
Payer: COMMERCIAL

## 2024-05-16 LAB
PATH REPORT.COMMENTS IMP SPEC: ABNORMAL
PATH REPORT.COMMENTS IMP SPEC: YES
PATH REPORT.FINAL DX SPEC: ABNORMAL
PATH REPORT.GROSS SPEC: ABNORMAL
PATH REPORT.MICROSCOPIC SPEC OTHER STN: ABNORMAL
PATH REPORT.RELEVANT HX SPEC: ABNORMAL
PATHOLOGY SYNOPTIC REPORT: ABNORMAL
PHOTO IMAGE: ABNORMAL

## 2024-05-16 PROCEDURE — 88307 TISSUE EXAM BY PATHOLOGIST: CPT | Mod: 26 | Performed by: PATHOLOGY

## 2024-05-16 PROCEDURE — 88341 IMHCHEM/IMCYTCHM EA ADD ANTB: CPT | Mod: 26 | Performed by: PATHOLOGY

## 2024-05-16 PROCEDURE — 88309 TISSUE EXAM BY PATHOLOGIST: CPT | Mod: 26 | Performed by: PATHOLOGY

## 2024-05-16 PROCEDURE — 88342 IMHCHEM/IMCYTCHM 1ST ANTB: CPT | Mod: 26 | Performed by: PATHOLOGY

## 2024-05-16 NOTE — TELEPHONE ENCOUNTER
[facility] Call Center    Phone Message    May a detailed message be left on voicemail: yes     Reason for Call: Other: Patient calling to speak with nurse Isa is regards to her appt with Dr. Trinidad. Patient stated the last time she spoke with Isa, she was told that they will look into getting her appt change to a closer location. Please call patient back to further discuss.      Action Taken: Other: Cardiology    Travel Screening: Not Applicable    Thank you!  [facility]

## 2024-05-17 NOTE — TELEPHONE ENCOUNTER
Pt was contacted by scheduling. Given option, and declined. Will keep appt at [facility] as arranged. LUISRN

## 2024-05-21 ENCOUNTER — OFFICE VISIT (OUTPATIENT)
Dept: CARDIOLOGY | Facility: CLINIC | Age: 69
End: 2024-05-21
Payer: COMMERCIAL

## 2024-05-21 VITALS
WEIGHT: 197.8 LBS | RESPIRATION RATE: 20 BRPM | DIASTOLIC BLOOD PRESSURE: 70 MMHG | BODY MASS INDEX: 35.04 KG/M2 | SYSTOLIC BLOOD PRESSURE: 152 MMHG | HEART RATE: 63 BPM | OXYGEN SATURATION: 96 %

## 2024-05-21 DIAGNOSIS — N18.4 CKD (CHRONIC KIDNEY DISEASE) STAGE 4, GFR 15-29 ML/MIN (H): ICD-10-CM

## 2024-05-21 DIAGNOSIS — I50.32 CHRONIC DIASTOLIC CONGESTIVE HEART FAILURE (H): ICD-10-CM

## 2024-05-21 DIAGNOSIS — I25.118 CORONARY ARTERY DISEASE OF NATIVE ARTERY OF NATIVE HEART WITH STABLE ANGINA PECTORIS (H): Primary | ICD-10-CM

## 2024-05-21 DIAGNOSIS — I10 PRIMARY HYPERTENSION: ICD-10-CM

## 2024-05-21 PROBLEM — E66.812 CLASS 2 SEVERE OBESITY DUE TO EXCESS CALORIES WITH SERIOUS COMORBIDITY IN ADULT (H): Status: ACTIVE | Noted: 2024-05-21

## 2024-05-21 PROBLEM — E66.01 CLASS 2 SEVERE OBESITY DUE TO EXCESS CALORIES WITH SERIOUS COMORBIDITY IN ADULT (H): Status: ACTIVE | Noted: 2024-05-21

## 2024-05-21 PROCEDURE — 99214 OFFICE O/P EST MOD 30 MIN: CPT | Performed by: INTERNAL MEDICINE

## 2024-05-21 PROCEDURE — G2211 COMPLEX E/M VISIT ADD ON: HCPCS | Performed by: INTERNAL MEDICINE

## 2024-05-21 RX ORDER — ONDANSETRON 4 MG/1
4 TABLET, FILM COATED ORAL 3 TIMES DAILY PRN
COMMUNITY
Start: 2023-08-15

## 2024-05-21 RX ORDER — ISOSORBIDE DINITRATE 30 MG/1
30 TABLET ORAL DAILY
COMMUNITY

## 2024-05-21 NOTE — LETTER
5/21/2024    ALEAH Justice  [address] [facility] [address]    RE: Mariama Keene       Dear Colleague,     I had the pleasure of seeing Mariama Keene in the [facility].    HEART CARE ENCOUNTER CONSULTATON NOTE      [facility]  [phone]      Assessment/Recommendations   Assessment:   1.  Chronic congestive heart failure secondary to heart failure with preserved ejection fraction with left ventricular hypertrophy related to hypertension.  NYHA: early II (stable).    2.  Coronary artery disease, moderate to severe disease in right coronary artery medically managed.  No active anginal chest pain.  Dyspnea stable.  3. Hypertension, elevated today due to acute pain from surgical intervention  3. HLD.    4. DM type II  5. History of premature atrial contractions, regular today  6. CKD stage IV (GFR (22).     GFR Estimate   Date Value Ref Range Status   05/12/2024 18 (L) >60 mL/min/1.73m2 Final   04/07/2021 32 (L) >60 mL/min/1.73m2 Final     GFR Estimate If Black   Date Value Ref Range Status   04/07/2021 39 (L) >60 mL/min/1.73m2 Final   7. Asthma   8. Invasive squamous cell carcinoma of the vulva status post surgical resection    Plan:   1.  Increase Lasix to 40 mg in AM and 40 mg in PM  2.  Continue Imdur for management of coronary artery disease.  No active chest pain.  Will hold on intervention at this time  3.  Amlodipine, losartan and metoprolol for HTN  4.  Off aspirin postoperatively due to vaginal bleeding.    Follow-up in 6 months.  Call instructions given to patient.  She will call if she notices any change in dyspnea or chest pain.  Tolerating current medical therapy of beta-blocker and isosorbide mononitrate for coronary artery disease     History of Present Illness/Subjective    HPI: Mariama Keene is a 68 year old female with chronic kidney disease stage IV, diabetes mellitus which is uncontrolled, hypertension which has been uncontrolled for some time,  asthma who presents to cardiology clinic in follow-up for heart failure with preserved ejection fraction and coronary disease.    During last evaluation she was seen for preoperative surgical evaluation for GYN surgery.  She underwent coronary angiogram due to abnormal stress test.  Coronary angiogram demonstrated mild to moderate diffuse coronary disease with moderate to significant obstruction in the mid to distal RCA.  She was started on Imdur therapy and has had no chest pain or significant dyspnea.  She tolerated surgery without complication.  She continues have some pain related to her surgical intervention.    Overall she feels she is recovering.  Still having some vaginal bleeding after surgery.  Off aspirin at this time.    No significant lower extremity edema.  Lungs are clear on examination today.  She is stable on her Lasix dosing.  She did have some acute kidney injury after surgery which is improving.      Coronary angiogram: 4/18/2024  Findings:  LM:no obstruction  LAD:mid-vessel 50-60% narrowing  Lcx:mid-vessel 40-50% narrowing  RCA:dominant but small in caliber, ostial 70%, mid-vessel 80% narrowing     LVEDP:20     Access:  R Radial artery     Closure:   Vasc Band    Echocardiogram: 2022  Normal left ventricular size. Mild to moderate left ventricular hypertrophy.  Left ventricular function appears normal. Left ventricular ejection fraction  estimated 55 to 60%.Diastolic function appears abnormal.  Normal right ventricular size and systolic function.  Mild left atrial enlargement  No hemodynamically significant valve abnormality         Physical Examination  Review of Systems   Vitals: BP (!) 152/70 (BP Location: Right arm, Patient Position: Sitting, Cuff Size: Adult Large)   Pulse 63   Resp 20   Wt 89.7 kg (197 lb 12.8 oz)   SpO2 96%   BMI 35.04 kg/m    BMI= Body mass index is 35.04 kg/m .  Wt Readings from Last 3 Encounters:   05/21/24 89.7 kg (197 lb 12.8 oz)   05/07/24 88.8 kg (195 lb 12.8  oz)   04/18/24 89.3 kg (196 lb 14.4 oz)        Pulm.   ENT/Mouth: membranes moist, no oral lesions or bleeding gums.      EYES:  no scleral icterus, normal conjunctivae       Chest/Lungs:   lungs are clear to auscultation, no rales or wheezing, no sternal scar, equal chest wall expansion    Cardiovascular:   Regular. Normal first and second heart sounds with no murmurs, rubs, or gallops; the carotid, radial  pulses are intact, Jugular venous pressure normal, mild pitting edema bilaterally    Abdomen:  no tenderness; bowel sounds are present   Extremities: no cyanosis or clubbing   Skin: no xanthelasma, warm.    Neurologic: normal  bilateral, no tremors     Psychiatric: alert and oriented x3, calm        Please refer above for cardiac ROS details.        Medical History  Surgical History Family History Social History   Past Medical History:   Diagnosis Date    Asthma     Asthma     Asthma     Congestive heart failure (H)     Diabetes (H)     Diabetes (H)     Diabetes (H)     Hypertension     Hypertension     Hypertension     Panic attacks     Panic attacks     Panic attacks      Past Surgical History:   Procedure Laterality Date    ABSCESS DRAINAGE      On back    APPENDECTOMY      BIOPSY NODE SENTINEL Bilateral 5/7/2024    Procedure: BILATERAL SENTINEL LYMPH NODE INJECTION AND BIOPSY;  Surgeon: Lorelei Aburto MD;  Location: [facility] OR    CHOLECYSTECTOMY      CV CORONARY ANGIOGRAM N/A 04/18/2024    Procedure: Coronary Angiogram;  Surgeon: Casa Vasquez MD;  Location: [facility]    CV LEFT HEART CATH N/A 04/18/2024    Procedure: Left Heart Catheterization;  Surgeon: Casa Vasquez MD;  Location: [facility] CATH LAB CV    HYSTERECTOMY      age 33 - partial    NEPHRECTOMY PARTIAL Right     OOPHORECTOMY      age 48    VULVECTOMY RADICAL (NO GROIN DISSECTION) N/A 5/7/2024    Procedure: MODIFIED RADICAL VULVECTOMY,;  Surgeon: Lorelei Aburto MD;  Location: [facility] OR      Family History   Problem Relation Age of Onset    Breast Cancer Maternal Aunt 45.00    Alzheimer Disease Mother     Hypertension Mother     Chronic Obstructive Pulmonary Disease Father         Social History     Socioeconomic History    Marital status:      Spouse name: Not on file    Number of children: Not on file    Years of education: Not on file    Highest education level: Not on file   Occupational History    Not on file   Tobacco Use    Smoking status: Former     Current packs/day: 0.00     Types: Cigarettes     Quit date: 1992     Years since quittin.4    Smokeless tobacco: Never   Substance and Sexual Activity    Alcohol use: Yes    Drug use: Never    Sexual activity: Not Currently     Birth control/protection: Surgical   Other Topics Concern    Not on file   Social History Narrative    Not on file     Social Determinants of Health     Financial Resource Strain: Not on file   Food Insecurity: Not on file   Transportation Needs: Not on file   Physical Activity: Not on file   Stress: Not on file   Social Connections: Not on file   Interpersonal Safety: Not on file   Housing Stability: Not on file           Medications  Allergies   Current Outpatient Medications   Medication Sig Dispense Refill    acetaminophen (TYLENOL) 325 MG tablet Take 3 tablets (975 mg) by mouth every 6 hours as needed for mild pain      albuterol (PROAIR HFA/PROVENTIL HFA/VENTOLIN HFA) 108 (90 Base) MCG/ACT inhaler Inhale 2 puffs into the lungs every 4 hours as needed for shortness of breath, wheezing or cough      amLODIPine (NORVASC) 5 MG tablet Take 5 mg by mouth daily      BD PEN NEEDLE ALEJANDRO 2ND GEN 32G X 4 MM miscellaneous USE THREE TIMES DAILY WITH NOVOLOG.      Continuous Blood Gluc Sensor (FREESTYLE TEMI 2 SENSOR) MISC Change every 14 days.      furosemide (LASIX) 40 MG tablet Take by mouth 2 times daily 80 mg in the morning and 40 mg in the evening      isosorbide dinitrate (ISORDIL) 30 MG tablet Take 30 mg  by mouth daily      isosorbide mononitrate (IMDUR) 30 MG 24 hr tablet Take 1 tablet (30 mg) by mouth daily 30 tablet 12    Lancets (ONETOUCH DELICA PLUS PQUUPH29G) MISC TEST THREE TIMES DAILY      levothyroxine (SYNTHROID/LEVOTHROID) 100 MCG tablet Take 1 tablet (100 mcg) by mouth daily 30 tablet 0    losartan (COZAAR) 50 MG tablet Take 50 mg by mouth daily      metoprolol tartrate (LOPRESSOR) 50 MG tablet [METOPROLOL TARTRATE (LOPRESSOR) 50 MG TABLET] Take 2 tablets (100 mg total) by mouth 2 (two) times a day. 60 tablet 0    NOVOLOG FLEXPEN 100 UNIT/ML soln Inject 8-15 Units Subcutaneous 3 times daily (with meals) plus sliding scale  151-175- 1 unit  176-200- 2 units  201-225- 3 units  226-250- 4 units  251-275- 5 units   276-300- 6 units  301-325- 7 units  326-350- 8 units  351-375- 9 units      omeprazole (PRILOSEC) 20 MG capsule [OMEPRAZOLE (PRILOSEC) 20 MG CAPSULE] Take 1 capsule (20 mg total) by mouth daily before breakfast. 30 capsule 0    ONETOUCH VERIO IQ test strip test three times daily      rosuvastatin (CRESTOR) 40 MG tablet Take 1 tablet (40 mg) by mouth daily 90 tablet 3    silver sulfADIAZINE (SILVADENE) 1 % external cream Apply topically 2 times daily 50 g 1    TRESIBA FLEXTOUCH 100 UNIT/ML pen Inject 54 Units Subcutaneous daily      WIXELA INHUB 250-50 MCG/ACT inhaler Inhale 1 puff into the lungs 2 times daily      ketoconazole (NIZORAL) 2 % external shampoo Apply topically daily as needed for itching or irritation (Patient not taking: Reported on 5/21/2024)      lidocaine (XYLOCAINE) 5 % external ointment Apply topically 4 times daily as needed for moderate pain (Patient not taking: Reported on 5/21/2024)      LORazepam (ATIVAN) 0.5 MG tablet Take 0.5 mg by mouth daily as needed for anxiety (Patient not taking: Reported on 5/21/2024)      mometasone (NASONEX) 50 MCG/ACT nasal spray Spray 2 sprays into both nostrils daily as needed (rhinitis) (Patient not taking: Reported on 5/21/2024)       ondansetron (ZOFRAN) 4 MG tablet Take 4 mg by mouth 3 times daily as needed for nausea or vomiting (Patient not taking: Reported on 5/21/2024)      polyethylene glycol (MIRALAX) 17 GM/Dose powder Take 17 g by mouth daily (Patient not taking: Reported on 5/21/2024) 510 g 0    senna-docusate (SENOKOT-S/PERICOLACE) 8.6-50 MG tablet Take 1-2 tablets by mouth 2 times daily as needed for constipation (While on oral opioids.) (Patient not taking: Reported on 5/21/2024)         Allergies   Allergen Reactions    Alprazolam Other (See Comments)    Cephalexin Rash and Swelling    Exenatide Other (See Comments)    Hydralazine      Flush and Elevated BP     Iodinated Contrast Media [Iodinated Contrast Media] Unknown    Lisinopril Other (See Comments)    Sulfa (Sulfonamide Antibiotics) [Sulfa Antibiotics] Other (See Comments)    Macrobid [Nitrofurantoin] Rash          Lab Results    Chemistry/lipid CBC Cardiac Enzymes/BNP/TSH/INR   Recent Labs   Lab Test 12/08/22  0937   CHOL 238*   HDL 63   *   TRIG 191*     Recent Labs   Lab Test 12/08/22  0937 03/16/22  1011 07/14/21  1152   * 58 74     Recent Labs   Lab Test 12/28/22  1207   *   POTASSIUM 4.3   CHLORIDE 97*   CO2 26   *   BUN 36.5*   CR 2.13*   GFRESTIMATED 24*   BILL 9.0     Recent Labs   Lab Test 12/28/22  1207 12/06/22  1135 12/04/22  0624   CR 2.13* 2.00* 1.81*     Recent Labs   Lab Test 11/28/22  0835 08/17/20  1106   A1C 10.2* 10.3*          Recent Labs   Lab Test 12/04/22  0624 12/01/22  0658 11/29/22  0640   WBC  --   --  5.6   HGB  --   --  14.2   HCT  --   --  41.7   MCV  --   --  91      < > 157    < > = values in this interval not displayed.     Recent Labs   Lab Test 11/29/22  0640 11/28/22  0835 06/17/22  0130   HGB 14.2 13.2 14.2    Recent Labs   Lab Test 06/17/22  0130 08/19/20  1407 08/17/20  1106   TROPONINI 0.02 0.03 0.03     Recent Labs   Lab Test 11/28/22  0713   NTBNPI 2,370*     Recent Labs   Lab Test 12/06/22  1135    TSH 3.09     Recent Labs   Lab Test 11/28/22  0920 08/20/20  1026 08/19/20  1407   INR 1.00 1.03 0.93        Lewis Trinidad DO      Patient require longitudinal management of her coronary artery disease.  Prior ongoing follow-up.  Will need to monitor for ongoing angina.  She remains on a anginal medications including Imdur and beta-blocker                  Thank you for allowing me to participate in the care of your patient.      Sincerely,     Lewis Trinidad DO     [facility]  cc:   Lewis Trinidad DO  [address] [place] [address]

## 2024-05-21 NOTE — PROGRESS NOTES
HEART CARE ENCOUNTER CONSULTATON NOTE      [facility]  [phone]      Assessment/Recommendations   Assessment:   1.  Chronic congestive heart failure secondary to heart failure with preserved ejection fraction with left ventricular hypertrophy related to hypertension.  NYHA: early II (stable).    2.  Coronary artery disease, moderate to severe disease in right coronary artery medically managed.  No active anginal chest pain.  Dyspnea stable.  3. Hypertension, elevated today due to acute pain from surgical intervention  3. HLD.    4. DM type II  5. History of premature atrial contractions, regular today  6. CKD stage IV (GFR (22).     GFR Estimate   Date Value Ref Range Status   05/12/2024 18 (L) >60 mL/min/1.73m2 Final   04/07/2021 32 (L) >60 mL/min/1.73m2 Final     GFR Estimate If Black   Date Value Ref Range Status   04/07/2021 39 (L) >60 mL/min/1.73m2 Final   7. Asthma   8. Invasive squamous cell carcinoma of the vulva status post surgical resection    Plan:   1.  Increase Lasix to 40 mg in AM and 40 mg in PM  2.  Continue Imdur for management of coronary artery disease.  No active chest pain.  Will hold on intervention at this time  3.  Amlodipine, losartan and metoprolol for HTN  4.  Off aspirin postoperatively due to vaginal bleeding.    Follow-up in 6 months.  Call instructions given to patient.  She will call if she notices any change in dyspnea or chest pain.  Tolerating current medical therapy of beta-blocker and isosorbide mononitrate for coronary artery disease     History of Present Illness/Subjective    HPI: Mariama Keene is a 68 year old female with chronic kidney disease stage IV, diabetes mellitus which is uncontrolled, hypertension which has been uncontrolled for some time, asthma who presents to cardiology clinic in follow-up for heart failure with preserved ejection fraction and coronary disease.    During last evaluation she was seen for preoperative surgical evaluation  for GYN surgery.  She underwent coronary angiogram due to abnormal stress test.  Coronary angiogram demonstrated mild to moderate diffuse coronary disease with moderate to significant obstruction in the mid to distal RCA.  She was started on Imdur therapy and has had no chest pain or significant dyspnea.  She tolerated surgery without complication.  She continues have some pain related to her surgical intervention.    Overall she feels she is recovering.  Still having some vaginal bleeding after surgery.  Off aspirin at this time.    No significant lower extremity edema.  Lungs are clear on examination today.  She is stable on her Lasix dosing.  She did have some acute kidney injury after surgery which is improving.      Coronary angiogram: 4/18/2024  Findings:  LM:no obstruction  LAD:mid-vessel 50-60% narrowing  Lcx:mid-vessel 40-50% narrowing  RCA:dominant but small in caliber, ostial 70%, mid-vessel 80% narrowing     LVEDP:20     Access:  R Radial artery     Closure:   Vasc Band    Echocardiogram: 2022  Normal left ventricular size. Mild to moderate left ventricular hypertrophy.  Left ventricular function appears normal. Left ventricular ejection fraction  estimated 55 to 60%.Diastolic function appears abnormal.  Normal right ventricular size and systolic function.  Mild left atrial enlargement  No hemodynamically significant valve abnormality         Physical Examination  Review of Systems   Vitals: BP (!) 152/70 (BP Location: Right arm, Patient Position: Sitting, Cuff Size: Adult Large)   Pulse 63   Resp 20   Wt 89.7 kg (197 lb 12.8 oz)   SpO2 96%   BMI 35.04 kg/m    BMI= Body mass index is 35.04 kg/m .  Wt Readings from Last 3 Encounters:   05/21/24 89.7 kg (197 lb 12.8 oz)   05/07/24 88.8 kg (195 lb 12.8 oz)   04/18/24 89.3 kg (196 lb 14.4 oz)        Pulm.   ENT/Mouth: membranes moist, no oral lesions or bleeding gums.      EYES:  no scleral icterus, normal conjunctivae       Chest/Lungs:   lungs are  clear to auscultation, no rales or wheezing, no sternal scar, equal chest wall expansion    Cardiovascular:   Regular. Normal first and second heart sounds with no murmurs, rubs, or gallops; the carotid, radial  pulses are intact, Jugular venous pressure normal, mild pitting edema bilaterally    Abdomen:  no tenderness; bowel sounds are present   Extremities: no cyanosis or clubbing   Skin: no xanthelasma, warm.    Neurologic: normal  bilateral, no tremors     Psychiatric: alert and oriented x3, calm        Please refer above for cardiac ROS details.        Medical History  Surgical History Family History Social History   Past Medical History:   Diagnosis Date     Asthma      Asthma      Asthma      Congestive heart failure (H)      Diabetes (H)      Diabetes (H)      Diabetes (H)      Hypertension      Hypertension      Hypertension      Panic attacks      Panic attacks      Panic attacks      Past Surgical History:   Procedure Laterality Date     ABSCESS DRAINAGE      On back     APPENDECTOMY       BIOPSY NODE SENTINEL Bilateral 5/7/2024    Procedure: BILATERAL SENTINEL LYMPH NODE INJECTION AND BIOPSY;  Surgeon: Lorelei Aburto MD;  Location: [facility] OR     CHOLECYSTECTOMY       CV CORONARY ANGIOGRAM N/A 04/18/2024    Procedure: Coronary Angiogram;  Surgeon: Casa Vasquez MD;  Location: [facility] CATH LAB CV     CV LEFT HEART CATH N/A 04/18/2024    Procedure: Left Heart Catheterization;  Surgeon: Casa Vasquez MD;  Location: [facility] CATH LAB CV     HYSTERECTOMY      age 33 - partial     NEPHRECTOMY PARTIAL Right      OOPHORECTOMY      age 48     VULVECTOMY RADICAL (NO GROIN DISSECTION) N/A 5/7/2024    Procedure: MODIFIED RADICAL VULVECTOMY,;  Surgeon: Lorelei Aburto MD;  Location: [facility] OR     Family History   Problem Relation Age of Onset     Breast Cancer Maternal Aunt 45.00     Alzheimer Disease Mother      Hypertension Mother      Chronic Obstructive Pulmonary  Disease Father         Social History     Socioeconomic History     Marital status:      Spouse name: Not on file     Number of children: Not on file     Years of education: Not on file     Highest education level: Not on file   Occupational History     Not on file   Tobacco Use     Smoking status: Former     Current packs/day: 0.00     Types: Cigarettes     Quit date: 1992     Years since quittin.4     Smokeless tobacco: Never   Substance and Sexual Activity     Alcohol use: Yes     Drug use: Never     Sexual activity: Not Currently     Birth control/protection: Surgical   Other Topics Concern     Not on file   Social History Narrative     Not on file     Social Determinants of Health     Financial Resource Strain: Not on file   Food Insecurity: Not on file   Transportation Needs: Not on file   Physical Activity: Not on file   Stress: Not on file   Social Connections: Not on file   Interpersonal Safety: Not on file   Housing Stability: Not on file           Medications  Allergies   Current Outpatient Medications   Medication Sig Dispense Refill     acetaminophen (TYLENOL) 325 MG tablet Take 3 tablets (975 mg) by mouth every 6 hours as needed for mild pain       albuterol (PROAIR HFA/PROVENTIL HFA/VENTOLIN HFA) 108 (90 Base) MCG/ACT inhaler Inhale 2 puffs into the lungs every 4 hours as needed for shortness of breath, wheezing or cough       amLODIPine (NORVASC) 5 MG tablet Take 5 mg by mouth daily       BD PEN NEEDLE ALEJANDRO 2ND GEN 32G X 4 MM miscellaneous USE THREE TIMES DAILY WITH NOVOLOG.       Continuous Blood Gluc Sensor (FREESTYLE TEMI 2 SENSOR) MISC Change every 14 days.       furosemide (LASIX) 40 MG tablet Take by mouth 2 times daily 80 mg in the morning and 40 mg in the evening       isosorbide dinitrate (ISORDIL) 30 MG tablet Take 30 mg by mouth daily       isosorbide mononitrate (IMDUR) 30 MG 24 hr tablet Take 1 tablet (30 mg) by mouth daily 30 tablet 12     Lancets (ONETOUCH DELICA PLUS  [id number]) MISC TEST THREE TIMES DAILY       levothyroxine (SYNTHROID/LEVOTHROID) 100 MCG tablet Take 1 tablet (100 mcg) by mouth daily 30 tablet 0     losartan (COZAAR) 50 MG tablet Take 50 mg by mouth daily       metoprolol tartrate (LOPRESSOR) 50 MG tablet [METOPROLOL TARTRATE (LOPRESSOR) 50 MG TABLET] Take 2 tablets (100 mg total) by mouth 2 (two) times a day. 60 tablet 0     NOVOLOG FLEXPEN 100 UNIT/ML soln Inject 8-15 Units Subcutaneous 3 times daily (with meals) plus sliding scale  151-175- 1 unit  176-200- 2 units  201-225- 3 units  226-250- 4 units  251-275- 5 units   276-300- 6 units  301-325- 7 units  326-350- 8 units  351-375- 9 units       omeprazole (PRILOSEC) 20 MG capsule [OMEPRAZOLE (PRILOSEC) 20 MG CAPSULE] Take 1 capsule (20 mg total) by mouth daily before breakfast. 30 capsule 0     ONETOUCH VERIO IQ test strip test three times daily       rosuvastatin (CRESTOR) 40 MG tablet Take 1 tablet (40 mg) by mouth daily 90 tablet 3     silver sulfADIAZINE (SILVADENE) 1 % external cream Apply topically 2 times daily 50 g 1     TRESIBA FLEXTOUCH 100 UNIT/ML pen Inject 54 Units Subcutaneous daily       WIXELA INHUB 250-50 MCG/ACT inhaler Inhale 1 puff into the lungs 2 times daily       ketoconazole (NIZORAL) 2 % external shampoo Apply topically daily as needed for itching or irritation (Patient not taking: Reported on 5/21/2024)       lidocaine (XYLOCAINE) 5 % external ointment Apply topically 4 times daily as needed for moderate pain (Patient not taking: Reported on 5/21/2024)       LORazepam (ATIVAN) 0.5 MG tablet Take 0.5 mg by mouth daily as needed for anxiety (Patient not taking: Reported on 5/21/2024)       mometasone (NASONEX) 50 MCG/ACT nasal spray Spray 2 sprays into both nostrils daily as needed (rhinitis) (Patient not taking: Reported on 5/21/2024)       ondansetron (ZOFRAN) 4 MG tablet Take 4 mg by mouth 3 times daily as needed for nausea or vomiting (Patient not taking: Reported on 5/21/2024)        polyethylene glycol (MIRALAX) 17 GM/Dose powder Take 17 g by mouth daily (Patient not taking: Reported on 5/21/2024) 510 g 0     senna-docusate (SENOKOT-S/PERICOLACE) 8.6-50 MG tablet Take 1-2 tablets by mouth 2 times daily as needed for constipation (While on oral opioids.) (Patient not taking: Reported on 5/21/2024)         Allergies   Allergen Reactions     Alprazolam Other (See Comments)     Cephalexin Rash and Swelling     Exenatide Other (See Comments)     Hydralazine      Flush and Elevated BP      Iodinated Contrast Media [Iodinated Contrast Media] Unknown     Lisinopril Other (See Comments)     Sulfa (Sulfonamide Antibiotics) [Sulfa Antibiotics] Other (See Comments)     Macrobid [Nitrofurantoin] Rash          Lab Results    Chemistry/lipid CBC Cardiac Enzymes/BNP/TSH/INR   Recent Labs   Lab Test 12/08/22  0937   CHOL 238*   HDL 63   *   TRIG 191*     Recent Labs   Lab Test 12/08/22  0937 03/16/22  1011 07/14/21  1152   * 58 74     Recent Labs   Lab Test 12/28/22  1207   *   POTASSIUM 4.3   CHLORIDE 97*   CO2 26   *   BUN 36.5*   CR 2.13*   GFRESTIMATED 24*   BILL 9.0     Recent Labs   Lab Test 12/28/22  1207 12/06/22  1135 12/04/22  0624   CR 2.13* 2.00* 1.81*     Recent Labs   Lab Test 11/28/22  0835 08/17/20  1106   A1C 10.2* 10.3*          Recent Labs   Lab Test 12/04/22  0624 12/01/22  0658 11/29/22  0640   WBC  --   --  5.6   HGB  --   --  14.2   HCT  --   --  41.7   MCV  --   --  91      < > 157    < > = values in this interval not displayed.     Recent Labs   Lab Test 11/29/22  0640 11/28/22  0835 06/17/22  0130   HGB 14.2 13.2 14.2    Recent Labs   Lab Test 06/17/22  0130 08/19/20  1407 08/17/20  1106   TROPONINI 0.02 0.03 0.03     Recent Labs   Lab Test 11/28/22  0713   NTBNPI 2,370*     Recent Labs   Lab Test 12/06/22  1135   TSH 3.09     Recent Labs   Lab Test 11/28/22  0920 08/20/20  1026 08/19/20  1407   INR 1.00 1.03 0.93        Lewis Trinidad,  DO      Patient require longitudinal management of her coronary artery disease.  Prior ongoing follow-up.  Will need to monitor for ongoing angina.  She remains on a anginal medications including Imdur and beta-blocker

## 2024-05-23 ENCOUNTER — LAB REQUISITION (OUTPATIENT)
Dept: LAB | Facility: CLINIC | Age: 69
End: 2024-05-23

## 2024-05-23 ENCOUNTER — TRANSFERRED RECORDS (OUTPATIENT)
Dept: HEALTH INFORMATION MANAGEMENT | Facility: CLINIC | Age: 69
End: 2024-05-23

## 2024-05-23 DIAGNOSIS — R35.0 FREQUENCY OF MICTURITION: ICD-10-CM

## 2024-05-23 DIAGNOSIS — E11.42 TYPE 2 DIABETES MELLITUS WITH DIABETIC POLYNEUROPATHY (H): ICD-10-CM

## 2024-05-23 LAB
ERYTHROCYTE [DISTWIDTH] IN BLOOD BY AUTOMATED COUNT: 13.2 % (ref 10–15)
HBA1C MFR BLD: 7.7 % (ref 4.2–6.1)
HCT VFR BLD AUTO: 31.5 % (ref 35–47)
HGB BLD-MCNC: 10.8 G/DL (ref 11.7–15.7)
MCH RBC QN AUTO: 31.2 PG (ref 26.5–33)
MCHC RBC AUTO-ENTMCNC: 34.3 G/DL (ref 31.5–36.5)
MCV RBC AUTO: 91 FL (ref 78–100)
PLATELET # BLD AUTO: 198 10E3/UL (ref 150–450)
RBC # BLD AUTO: 3.46 10E6/UL (ref 3.8–5.2)
WBC # BLD AUTO: 6.5 10E3/UL (ref 4–11)

## 2024-05-23 PROCEDURE — 80048 BASIC METABOLIC PNL TOTAL CA: CPT | Performed by: PHYSICIAN ASSISTANT

## 2024-05-23 PROCEDURE — 85041 AUTOMATED RBC COUNT: CPT | Performed by: PHYSICIAN ASSISTANT

## 2024-05-23 PROCEDURE — 87086 URINE CULTURE/COLONY COUNT: CPT | Performed by: PHYSICIAN ASSISTANT

## 2024-05-24 LAB
ANION GAP SERPL CALCULATED.3IONS-SCNC: 16 MMOL/L (ref 7–15)
BACTERIA UR CULT: NORMAL
BUN SERPL-MCNC: 58.2 MG/DL (ref 8–23)
CALCIUM SERPL-MCNC: 8.9 MG/DL (ref 8.8–10.2)
CHLORIDE SERPL-SCNC: 107 MMOL/L (ref 98–107)
CREAT SERPL-MCNC: 2.66 MG/DL (ref 0.51–0.95)
DEPRECATED HCO3 PLAS-SCNC: 20 MMOL/L (ref 22–29)
EGFRCR SERPLBLD CKD-EPI 2021: 19 ML/MIN/1.73M2
GLUCOSE SERPL-MCNC: 144 MG/DL (ref 70–99)
POTASSIUM SERPL-SCNC: 3.8 MMOL/L (ref 3.4–5.3)
SODIUM SERPL-SCNC: 143 MMOL/L (ref 135–145)

## 2024-05-31 ENCOUNTER — TELEPHONE (OUTPATIENT)
Dept: PHARMACY | Facility: OTHER | Age: 69
End: 2024-05-31
Payer: COMMERCIAL

## 2024-05-31 NOTE — TELEPHONE ENCOUNTER
MTM Recruitment: [facility] insurance     Referral outreach attempt #1 on May 31, 2024      Outcome: patient opted out    Anila Miranda CPhT  MT

## 2024-06-20 ENCOUNTER — TELEPHONE (OUTPATIENT)
Dept: CARDIOLOGY | Facility: CLINIC | Age: 69
End: 2024-06-20
Payer: COMMERCIAL

## 2024-06-20 NOTE — TELEPHONE ENCOUNTER
[facility] Measures Blood Pressure guideline reviewed.  Patients recent blood pressure is outside of guideline parameters.  Called patient to review, patient unable to check their blood pressure at this time. Patient instructed to call [phone] ([facility]) and leave a message with their name, date of birth, and blood pressure reading that was completed within the last 24 hours and where it was completed.  Will await call back for further review.

## 2024-06-24 ENCOUNTER — VIRTUAL VISIT (OUTPATIENT)
Dept: PHARMACY | Facility: PHYSICIAN GROUP | Age: 69
End: 2024-06-24
Payer: COMMERCIAL

## 2024-06-24 DIAGNOSIS — F32.A DEPRESSION, UNSPECIFIED DEPRESSION TYPE: ICD-10-CM

## 2024-06-24 DIAGNOSIS — E11.9 INSULIN DEPENDENT TYPE 2 DIABETES MELLITUS (H): Primary | ICD-10-CM

## 2024-06-24 DIAGNOSIS — Z79.4 INSULIN DEPENDENT TYPE 2 DIABETES MELLITUS (H): Primary | ICD-10-CM

## 2024-06-24 PROCEDURE — 99606 MTMS BY PHARM EST 15 MIN: CPT | Mod: 93 | Performed by: PHARMACIST

## 2024-06-24 PROCEDURE — 99607 MTMS BY PHARM ADDL 15 MIN: CPT | Mod: 93 | Performed by: PHARMACIST

## 2024-06-24 NOTE — PROGRESS NOTES
Medication Therapy Management (MTM) Encounter    ASSESSMENT:                            Medication Adherence/Access: See below for considerations    Type 2 Diabetes:  Needs improvement, patient is not meeting goal A1c <7%. However, her two week ursula report shows improvement. Plan to continue current insulin regimen at this time.    Mood: Needs improvement, recommend starting selective serotonin reuptake inhibitor such as Fluoxetine 20 mg daily for mood management.     PLAN:                            Continue taking your medications as prescribed.   I will talk to [place] about adjustments to your medications.     Follow-up: Return in 1 week (on 7/1/2024) for Follow up, with me.     SUBJECTIVE/OBJECTIVE:                          Bettye Keene is a 68 year old female called for a follow-up visit.     Reason for visit: med review.    Allergies/ADRs: Reviewed in chart  Past Medical History: Reviewed in chart  Tobacco: She reports that she quit smoking about 32 years ago. Her smoking use included cigarettes. She has never used smokeless tobacco.  Alcohol: infrequent use, one every other month     Medication Adherence/Access: occasionally forgets meal time insulin doses. No issues with oral meds or long acting insulin.         Diabetes   Type 2 Diabetes:    Patient is called today to discuss her diabetes management.  She has been using 8-12 units of meal time insulin and 52 units of her long acting. Reports she has been a little more nauseas than normal. She does report trying to watch what she eats.     Novolog 100 UNIT/ML Solution, 15 units on sliding scale as directed 3 times daily (before meals) (151-175- 1 unit 176-200- 2 units 201-225- 3 units 226-250- 4 units 251-275- 5 units 276-300- 6 units 301-325- 7 units 326-350- 8 units 351-375- 9 units)  Tresiba FlexTouch 100 UNIT/ML inject 52 units Subcutaneous once a day  Freestyle Ursula 2 sensor     Patient is not experiencing side effects.  Blood sugar monitoring:  Continuous Glucose Monitor Average glucose  Last 7 days - 156 mg/dl  Last 14 Days - 167 mg/dl     Current diabetes symptoms: none  Diet/Exercise: Reports exercise is challenging at this time due to ongoing chronic pain.      Eye exam is up to date, 12/13/23 [facility]   Foot exam: up to date    Depression  None.   Patient reports worsening mood over the past several weeks. Feels it started sometime in May.   Current symptoms include: low mood and motivation, crying often, having trouble focusing on tasks and avoiding plans with friends and family.  Patient is not seeing a therapist.       PATIENT HEALTH QUESTIONNAIRE-9 (PHQ - 9)    Over the last 2 weeks, how often have you been bothered by any of the following problems?    1. Little interest or pleasure in doing things -   3   2. Feeling down, depressed, or hopeless -   3   3. Trouble falling or staying asleep, or sleeping too much -  3   4. Feeling tired or having little energy -   3   5. Poor appetite or overeating -   3   6. Feeling bad about yourself - or that you are a failure or have let yourself or your family down -   1   7. Trouble concentrating on things, such as reading the newspaper or watching television -  2   8. Moving or speaking so slowly that other people could have noticed? Or the opposite - being so fidgety or restless that you have been moving around a lot more than usual  0   9. Thoughts that you would be better off dead or of hurting  yourself in some way  1   Total Score:  19     Developed by Razia Junior, Megan Robles, Paul Bolden and colleagues, with an educational jose from [employer] No permission required to reproduce, translate, display or distribute. permission required to reproduce, translate, display or distribute.        Today's Vitals: There were no vitals taken for this visit.  ----------------      I spent 23 minutes with this patient today. I offer these suggestions for consideration by Indiana Nixon. A  copy of the visit note was provided to the patient's provider(s).    A summary of these recommendations was sent to the patient via [employer].    Evelyn Odell, PharmD  Medication Therapy Management Pharmacist    Telemedicine Visit Details  Type of service:  Telephone visit  Start Time:  8:59 AM  End Time:  9:22  AM     Medication Therapy Recommendations  Depression, unspecified depression type    Rationale: Untreated condition - Needs additional medication therapy - Indication   Recommendation: Start Medication   Status: Contact Provider - Awaiting Response

## 2024-06-24 NOTE — PATIENT INSTRUCTIONS
"Recommendations from today's MTM visit:                                                       Continue taking your medications as prescribed.   I will talk to [place] about adjustments to your medications.     Follow-up: Return in 1 week (on 7/1/2024) for Follow up, with me.    It was great speaking with you today.  I value your experience and would be very thankful for your time in providing feedback in our clinic survey. In the next few days, you may receive an email or text message from [employer] with a link to a survey related to your  clinical pharmacist.\"     To schedule another MTM appointment, please call the clinic directly or you may call the MTM scheduling line at [phone].    My Clinical Pharmacist's contact information:                                                      Please feel free to contact me with any questions or concerns you have.      Evelyn Odell, PharmD  Medication Therapy Management Pharmacist    "

## 2024-06-27 VITALS — DIASTOLIC BLOOD PRESSURE: 80 MMHG | SYSTOLIC BLOOD PRESSURE: 122 MMHG

## 2024-06-27 NOTE — TELEPHONE ENCOUNTER
Patient returned call and left voicemail message with update blood pressure reading.      Last Blood Pressure: 152/70  Last Heart Rate: 63  Date: 05/21/24  Location: [facility] Cardiology    Today's Blood Pressure: 122/80  Location: PCP    Patient reported blood pressure updated in Epic. Blood pressure falls within [place] Community Measures guidelines.  Patient will follow up as previously advised.

## 2024-07-01 ENCOUNTER — VIRTUAL VISIT (OUTPATIENT)
Dept: PHARMACY | Facility: PHYSICIAN GROUP | Age: 69
End: 2024-07-01
Payer: COMMERCIAL

## 2024-07-01 DIAGNOSIS — F32.A DEPRESSION, UNSPECIFIED DEPRESSION TYPE: Primary | ICD-10-CM

## 2024-07-01 PROCEDURE — 99606 MTMS BY PHARM EST 15 MIN: CPT | Mod: 93 | Performed by: PHARMACIST

## 2024-07-01 PROCEDURE — 99607 MTMS BY PHARM ADDL 15 MIN: CPT | Mod: 93 | Performed by: PHARMACIST

## 2024-07-01 NOTE — PROGRESS NOTES
Medication Therapy Management (MTM) Encounter    ASSESSMENT:                            Medication Adherence/Access: See below for considerations    Mood: Needs improvement, recommend starting selective serotonin reuptake inhibitor such as Fluoxetine 20 mg daily for mood management.     PLAN:                            Begin taking Fluoxetine 20 mg capsule by mouth daily.   Continue taking all other medications as prescribed at this time.     Follow-up: Return in 17 days (on 7/18/2024) for Follow up, with me, using a phone visit.     SUBJECTIVE/OBJECTIVE:                          Bettye Keene is a 68 year old female called for a follow-up visit.     Reason for visit: med review.    Allergies/ADRs: Reviewed in chart  Past Medical History: Reviewed in chart  Tobacco: She reports that she quit smoking about 32 years ago. Her smoking use included cigarettes. She has never used smokeless tobacco.  Alcohol: infrequent use, one every other month     Medication Adherence/Access: occasionally forgets meal time insulin doses. No issues with oral meds or long acting insulin.           Depression  None.     Patient reports worsening mood over the past several weeks. Feels it started sometime in May. Since our call last week she is feeling a little better, but still wanting to try something to help her mood. We talked about starting Fluoxetine and patient is agreeable.     Current symptoms include: low mood and motivation, crying often, having trouble focusing on tasks and avoiding plans with friends and family.  Patient is not seeing a therapist.         Today's Vitals: There were no vitals taken for this visit.  ----------------      I spent 16 minutes with this patient today. All changes were made via CPA with [place] Nixon. A copy of the visit note was provided to the patient's provider(s).    A summary of these recommendations was sent to the patient via [employer].    Evelyn Odell, PharmD  Medication Therapy Management  Pharmacist    Telemedicine Visit Details  Type of service:  Telephone visit  Start Time:  9:28 AM  End Time:  9:44  AM     Medication Therapy Recommendations  Depression, unspecified depression type    Rationale: Untreated condition - Needs additional medication therapy - Indication   Recommendation: Start Medication   Status: Accepted per CPA

## 2024-07-01 NOTE — PATIENT INSTRUCTIONS
"Recommendations from today's MTM visit:                                                       Begin taking Fluoxetine 20 mg capsule by mouth daily.   Continue taking all other medications as prescribed at this time.     Follow-up: Return in 17 days (on 7/18/2024) for Follow up, with me, using a phone visit.    It was great speaking with you today.  I value your experience and would be very thankful for your time in providing feedback in our clinic survey. In the next few days, you may receive an email or text message from [employer] with a link to a survey related to your  clinical pharmacist.\"     To schedule another MTM appointment, please call the clinic directly or you may call the MTM scheduling line at [phone].    My Clinical Pharmacist's contact information:                                                      Please feel free to contact me with any questions or concerns you have.      Evelyn Odell, PharmD  Medication Therapy Management Pharmacist    "

## 2024-08-01 ENCOUNTER — VIRTUAL VISIT (OUTPATIENT)
Dept: PHARMACY | Facility: PHYSICIAN GROUP | Age: 69
End: 2024-08-01
Payer: COMMERCIAL

## 2024-08-01 DIAGNOSIS — Z79.4 INSULIN DEPENDENT TYPE 2 DIABETES MELLITUS (H): ICD-10-CM

## 2024-08-01 DIAGNOSIS — F32.A DEPRESSION, UNSPECIFIED DEPRESSION TYPE: Primary | ICD-10-CM

## 2024-08-01 DIAGNOSIS — E11.9 INSULIN DEPENDENT TYPE 2 DIABETES MELLITUS (H): ICD-10-CM

## 2024-08-01 PROCEDURE — 99607 MTMS BY PHARM ADDL 15 MIN: CPT | Mod: 93 | Performed by: PHARMACIST

## 2024-08-01 PROCEDURE — 99606 MTMS BY PHARM EST 15 MIN: CPT | Mod: 93 | Performed by: PHARMACIST

## 2024-08-01 NOTE — PATIENT INSTRUCTIONS
"Recommendations from today's MTM visit:                                                       Be sure to use your meal time insulin sliding scale as prescribed.   Continue to take all other medications as prescribed.   Today we scheduled your next diabetes visit with [place].     Follow-up: Return in 25 days (on 8/26/2024) for Follow up with [place] Nixon.    It was great speaking with you today.  I value your experience and would be very thankful for your time in providing feedback in our clinic survey. In the next few days, you may receive an email or text message from [employer] with a link to a survey related to your  clinical pharmacist.\"     To schedule another MTM appointment, please call the clinic directly or you may call the MTM scheduling line at [phone].    My Clinical Pharmacist's contact information:                                                      Please feel free to contact me with any questions or concerns you have.      Evelyn Odell, PharmD  Medication Therapy Management Pharmacist    "

## 2024-08-01 NOTE — PROGRESS NOTES
Medication Therapy Management (MTM) Encounter    ASSESSMENT:                            Medication Adherence/Access: See below for considerations    Type 2 Diabetes:  Needs improvement, patient is not meeting goal A1c <7%. We did discuss importance of using her sliding scale as prescribed (patient was under dosing meal time insulin).     Mood: Mildly improved. Patient did not start Escitalopram as started by her PCP. Feels her mood is ok, notes her weight is what upsets her the most.     PLAN:                            Be sure to use your meal time insulin sliding scale as prescribed.   Continue to take all other medications as prescribed.   Today we scheduled your next diabetes visit with [place].     Follow-up: Return in 25 days (on 8/26/2024) for Follow up with [place] Nixon.     SUBJECTIVE/OBJECTIVE:                          Bettye Keene is a 68 year old female called for a follow-up visit.     Reason for visit: med review.    Allergies/ADRs: Reviewed in chart  Past Medical History: Reviewed in chart  Tobacco: She reports that she quit smoking about 32 years ago. Her smoking use included cigarettes. She has never used smokeless tobacco.  Alcohol: infrequent use, one every other month     Medication Adherence/Access: occasionally forgets meal time insulin doses. No issues with oral meds or long acting insulin.          Type 2 Diabetes:    Patient is called today to discuss her diabetes management.  She has been using 9 units at meal times. Also reports she did not take any meal time insulin for a few days. Continues to take Tresiba as prescribed. Patient has history of GLP-1 intolerance and does not want to re-try any of these medications.     Novolog 100 UNIT/ML Solution, 15 units on sliding scale as directed 3 times daily (before meals) (151-175- 1 unit 176-200- 2 units 201-225- 3 units 226-250- 4 units 251-275- 5 units 276-300- 6 units 301-325- 7 units 326-350- 8 units 351-375- 9 units)  Tresiba FlexTouch 100  UNIT/ML inject 52 units Subcutaneous once a day  Freestyle Ursula 2 sensor     Patient is not experiencing side effects.  Blood sugar monitoring: Continuous Glucose Monitor Average glucose  Last 7 days - 140 mg/dl  Last 14 Days - 132 mg/dl     Current diabetes symptoms: none  Diet/Exercise: Reports exercise is challenging at this time due to ongoing chronic pain.      Eye exam is up to date, 12/13/23 [facility]   Foot exam: up to date       Depression  Patient reports she had severe GI upset with the fluoxetine. This was stopped and her PCP sent Escitalopram instead. Patient reports she never started the Escitalopram. Not currently taking anything for mood.  Notes her mood is somewhat stable, and the thing that bothers her most is her weight. She is really wanting to lose weight before an upcoming family reunion.   Patient is not seeing a therapist.         Today's Vitals: There were no vitals taken for this visit.  ----------------      I spent 20 minutes with this patient today. All changes were made via CPA with [place] Nixon. A copy of the visit note was provided to the patient's provider(s).    A summary of these recommendations was sent to the patient via XMLAW.    Evelyn Odell, PharmD  Medication Therapy Management Pharmacist    Telemedicine Visit Details  Type of service:  Telephone visit  Start Time:  3:03 PM  End Time:  3:23  PM     Medication Therapy Recommendations  Insulin dependent type 2 diabetes mellitus (H)    Current Medication: NOVOLOG FLEXPEN 100 UNIT/ML soln   Rationale: Does not understand instructions - Adherence - Adherence   Recommendation: Provide Education   Status: Patient Agreed - Adherence/Education

## 2024-09-12 ENCOUNTER — LAB REQUISITION (OUTPATIENT)
Dept: LAB | Facility: CLINIC | Age: 69
End: 2024-09-12

## 2024-09-12 DIAGNOSIS — E78.5 HYPERLIPIDEMIA, UNSPECIFIED: ICD-10-CM

## 2024-09-12 DIAGNOSIS — E11.22 TYPE 2 DIABETES MELLITUS WITH DIABETIC CHRONIC KIDNEY DISEASE (H): ICD-10-CM

## 2024-09-12 DIAGNOSIS — E03.9 HYPOTHYROIDISM, UNSPECIFIED: ICD-10-CM

## 2024-09-12 LAB
ALBUMIN SERPL BCG-MCNC: 3.9 G/DL (ref 3.5–5.2)
ALP SERPL-CCNC: 97 U/L (ref 40–150)
ALT SERPL W P-5'-P-CCNC: 25 U/L (ref 0–50)
ANION GAP SERPL CALCULATED.3IONS-SCNC: 11 MMOL/L (ref 7–15)
AST SERPL W P-5'-P-CCNC: 25 U/L (ref 0–45)
BILIRUB SERPL-MCNC: 0.5 MG/DL
BUN SERPL-MCNC: 56.4 MG/DL (ref 8–23)
CALCIUM SERPL-MCNC: 8.6 MG/DL (ref 8.8–10.4)
CHLORIDE SERPL-SCNC: 99 MMOL/L (ref 98–107)
CHOLEST SERPL-MCNC: 125 MG/DL
CREAT SERPL-MCNC: 2.79 MG/DL (ref 0.51–0.95)
CREAT UR-MCNC: 16.3 MG/DL
EGFRCR SERPLBLD CKD-EPI 2021: 18 ML/MIN/1.73M2
FASTING STATUS PATIENT QL REPORTED: ABNORMAL
FASTING STATUS PATIENT QL REPORTED: ABNORMAL
GLUCOSE SERPL-MCNC: 89 MG/DL (ref 70–99)
HCO3 SERPL-SCNC: 26 MMOL/L (ref 22–29)
HDLC SERPL-MCNC: 44 MG/DL
LDLC SERPL CALC-MCNC: 47 MG/DL
MICROALBUMIN UR-MCNC: 556 MG/L
MICROALBUMIN/CREAT UR: 3411.04 MG/G CR (ref 0–25)
NONHDLC SERPL-MCNC: 81 MG/DL
POTASSIUM SERPL-SCNC: 4.2 MMOL/L (ref 3.4–5.3)
PROT SERPL-MCNC: 6.8 G/DL (ref 6.4–8.3)
SODIUM SERPL-SCNC: 136 MMOL/L (ref 135–145)
TRIGL SERPL-MCNC: 168 MG/DL
TSH SERPL DL<=0.005 MIU/L-ACNC: 2.29 UIU/ML (ref 0.3–4.2)

## 2024-09-12 PROCEDURE — 84443 ASSAY THYROID STIM HORMONE: CPT | Performed by: PHYSICIAN ASSISTANT

## 2024-09-12 PROCEDURE — 80061 LIPID PANEL: CPT | Performed by: PHYSICIAN ASSISTANT

## 2024-09-12 PROCEDURE — 80053 COMPREHEN METABOLIC PANEL: CPT | Performed by: PHYSICIAN ASSISTANT

## 2024-09-12 PROCEDURE — 82043 UR ALBUMIN QUANTITATIVE: CPT | Performed by: PHYSICIAN ASSISTANT

## 2024-09-20 ENCOUNTER — ANCILLARY PROCEDURE (OUTPATIENT)
Dept: MAMMOGRAPHY | Facility: CLINIC | Age: 69
End: 2024-09-20
Payer: COMMERCIAL

## 2024-09-20 DIAGNOSIS — Z12.31 VISIT FOR SCREENING MAMMOGRAM: ICD-10-CM

## 2024-09-20 PROCEDURE — 77063 BREAST TOMOSYNTHESIS BI: CPT

## 2024-09-28 ENCOUNTER — HEALTH MAINTENANCE LETTER (OUTPATIENT)
Age: 69
End: 2024-09-28

## 2024-12-07 ENCOUNTER — HEALTH MAINTENANCE LETTER (OUTPATIENT)
Age: 69
End: 2024-12-07

## 2025-03-26 DIAGNOSIS — N18.4 TYPE 2 DIABETES MELLITUS WITH STAGE 4 CHRONIC KIDNEY DISEASE, WITHOUT LONG-TERM CURRENT USE OF INSULIN (H): ICD-10-CM

## 2025-03-26 DIAGNOSIS — Z72.0 TOBACCO USER: ICD-10-CM

## 2025-03-26 DIAGNOSIS — R94.39 ABNORMAL CARDIOVASCULAR STRESS TEST: ICD-10-CM

## 2025-03-26 DIAGNOSIS — I10 ESSENTIAL HYPERTENSION, BENIGN: ICD-10-CM

## 2025-03-26 DIAGNOSIS — I50.32 CHRONIC DIASTOLIC CONGESTIVE HEART FAILURE (H): Primary | ICD-10-CM

## 2025-03-26 DIAGNOSIS — E11.22 TYPE 2 DIABETES MELLITUS WITH STAGE 4 CHRONIC KIDNEY DISEASE, WITHOUT LONG-TERM CURRENT USE OF INSULIN (H): ICD-10-CM

## 2025-03-26 DIAGNOSIS — E78.2 MIXED HYPERLIPIDEMIA: ICD-10-CM

## 2025-03-26 RX ORDER — ISOSORBIDE MONONITRATE 30 MG/1
30 TABLET, EXTENDED RELEASE ORAL DAILY
Qty: 90 TABLET | OUTPATIENT
Start: 2025-03-26

## 2025-04-19 DIAGNOSIS — I50.32 CHRONIC DIASTOLIC CONGESTIVE HEART FAILURE (H): Primary | ICD-10-CM

## 2025-04-19 DIAGNOSIS — N18.32 CHRONIC KIDNEY DISEASE, STAGE 3B (H): ICD-10-CM

## 2025-04-19 DIAGNOSIS — I10 ESSENTIAL HYPERTENSION, BENIGN: ICD-10-CM

## 2025-04-19 DIAGNOSIS — E66.811 OBESITY (BMI 30.0-34.9): ICD-10-CM

## 2025-04-19 DIAGNOSIS — N18.4 TYPE 2 DIABETES MELLITUS WITH STAGE 4 CHRONIC KIDNEY DISEASE, WITHOUT LONG-TERM CURRENT USE OF INSULIN (H): ICD-10-CM

## 2025-04-19 DIAGNOSIS — I10 HYPERTENSION, UNSPECIFIED TYPE: ICD-10-CM

## 2025-04-19 DIAGNOSIS — E78.2 MIXED HYPERLIPIDEMIA: ICD-10-CM

## 2025-04-19 DIAGNOSIS — E11.42 POLYNEUROPATHY DUE TO TYPE 2 DIABETES MELLITUS (H): ICD-10-CM

## 2025-04-19 DIAGNOSIS — R94.39 ABNORMAL CARDIOVASCULAR STRESS TEST: ICD-10-CM

## 2025-04-19 DIAGNOSIS — Z72.0 TOBACCO USER: ICD-10-CM

## 2025-04-19 DIAGNOSIS — E11.22 TYPE 2 DIABETES MELLITUS WITH STAGE 4 CHRONIC KIDNEY DISEASE, WITHOUT LONG-TERM CURRENT USE OF INSULIN (H): ICD-10-CM

## 2025-04-21 RX ORDER — ISOSORBIDE MONONITRATE 30 MG/1
30 TABLET, EXTENDED RELEASE ORAL DAILY
Qty: 30 TABLET | Refills: 0 | Status: SHIPPED | OUTPATIENT
Start: 2025-04-21

## 2025-04-21 RX ORDER — ROSUVASTATIN CALCIUM 40 MG/1
40 TABLET, COATED ORAL DAILY
Qty: 30 TABLET | Refills: 0 | Status: SHIPPED | OUTPATIENT
Start: 2025-04-21

## 2025-05-15 ENCOUNTER — LAB REQUISITION (OUTPATIENT)
Dept: LAB | Facility: CLINIC | Age: 70
End: 2025-05-15

## 2025-05-15 DIAGNOSIS — E03.9 HYPOTHYROIDISM, UNSPECIFIED: ICD-10-CM

## 2025-05-15 DIAGNOSIS — I13.0 HYPERTENSIVE HEART AND CHRONIC KIDNEY DISEASE WITH HEART FAILURE AND STAGE 1 THROUGH STAGE 4 CHRONIC KIDNEY DISEASE, OR UNSPECIFIED CHRONIC KIDNEY DISEASE (H): ICD-10-CM

## 2025-05-15 LAB
ERYTHROCYTE [DISTWIDTH] IN BLOOD BY AUTOMATED COUNT: 12.2 % (ref 10–15)
HCT VFR BLD AUTO: 32.4 % (ref 35–47)
HGB BLD-MCNC: 10.9 G/DL (ref 11.7–15.7)
MCH RBC QN AUTO: 30.6 PG (ref 26.5–33)
MCHC RBC AUTO-ENTMCNC: 33.6 G/DL (ref 31.5–36.5)
MCV RBC AUTO: 91 FL (ref 78–100)
PLATELET # BLD AUTO: 163 10E3/UL (ref 150–450)
RBC # BLD AUTO: 3.56 10E6/UL (ref 3.8–5.2)
WBC # BLD AUTO: 4.5 10E3/UL (ref 4–11)

## 2025-05-15 PROCEDURE — 85041 AUTOMATED RBC COUNT: CPT | Performed by: PHYSICIAN ASSISTANT

## 2025-05-22 ENCOUNTER — OFFICE VISIT (OUTPATIENT)
Dept: CARDIOLOGY | Facility: CLINIC | Age: 70
End: 2025-05-22
Attending: INTERNAL MEDICINE
Payer: COMMERCIAL

## 2025-05-22 VITALS — SYSTOLIC BLOOD PRESSURE: 138 MMHG | HEART RATE: 56 BPM | DIASTOLIC BLOOD PRESSURE: 80 MMHG | RESPIRATION RATE: 16 BRPM

## 2025-05-22 DIAGNOSIS — E78.2 MIXED HYPERLIPIDEMIA: ICD-10-CM

## 2025-05-22 DIAGNOSIS — I50.32 CHRONIC DIASTOLIC CONGESTIVE HEART FAILURE (H): ICD-10-CM

## 2025-05-22 DIAGNOSIS — I10 PRIMARY HYPERTENSION: ICD-10-CM

## 2025-05-22 DIAGNOSIS — I25.118 CORONARY ARTERY DISEASE OF NATIVE ARTERY OF NATIVE HEART WITH STABLE ANGINA PECTORIS: Primary | ICD-10-CM

## 2025-05-22 RX ORDER — BRIMONIDINE TARTRATE 2 MG/ML
1 SOLUTION/ DROPS OPHTHALMIC 2 TIMES DAILY
COMMUNITY
Start: 2025-04-28

## 2025-05-22 RX ORDER — TIMOLOL MALEATE 5 MG/ML
SOLUTION/ DROPS OPHTHALMIC 2 TIMES DAILY
COMMUNITY
Start: 2024-12-02

## 2025-05-22 RX ORDER — INSULIN GLARGINE 100 [IU]/ML
43 INJECTION, SOLUTION SUBCUTANEOUS AT BEDTIME
COMMUNITY
Start: 2024-10-29

## 2025-05-22 RX ORDER — LATANOPROST 50 UG/ML
1 SOLUTION/ DROPS OPHTHALMIC
COMMUNITY
Start: 2025-05-16

## 2025-05-22 NOTE — PROGRESS NOTES
HEART CARE ENCOUNTER CONSULTATON NOTE      [facility]  [phone]      Assessment/Recommendations   Assessment:   1.  Chronic congestive heart failure secondary to heart failure with preserved ejection fraction with left ventricular hypertrophy related to hypertension.  NYHA: II.    2.  Coronary artery disease, moderate to severe disease in right coronary artery medically managed.  No active anginal chest pain.    3. Hypertension,controled.   3. HLD.    4. DM type II  5. History of premature atrial contractions, regular today  6. CKD stage IV (GFR (15).     GFR Estimate   Date Value Ref Range Status   05/15/2025 15 (L) >60 mL/min/1.73m2 Final     Comment:     eGFR calculated using 2021 CKD-EPI equation.   04/07/2021 32 (L) >60 mL/min/1.73m2 Final     GFR Estimate If Black   Date Value Ref Range Status   04/07/2021 39 (L) >60 mL/min/1.73m2 Final   7. Asthma   8. Invasive squamous cell carcinoma of the vulva status post surgical resection    Plan:   1.  Lasix to 80 mg in AM and 40 mg in PM  2.  Continue Imdur for management of coronary artery disease.  No active chest pain.  Will hold on intervention at this time  3.  Amlodipine, losartan and metoprolol for HTN  4.  Worsening renal function with SGLT2i in past.    5.  Support evaluation for renal transplant.       History of Present Illness/Subjective    HPI: Mariama Keene is a 69 year old female with chronic kidney disease stage IV, diabetes mellitus which is uncontrolled, hypertension which has been uncontrolled for some time, asthma who presents to cardiology clinic in follow-up for heart failure with preserved ejection fraction and coronary disease.    Patient is stable course since last visit.  No active chest pain.  Denies any significant lower extremity edema.  She does have mild dyspnea exertion which has been chronic and stable.  She is quite nervous about consideration for renal transplant.  Recommend that she is initially evaluated  to see if this is a long-term option for her chronic kidney disease likely related to her diabetes.    If she does plan to proceed with renal transplant will support workup from a cardiovascular standpoint.      Coronary angiogram: 4/18/2024  Findings:  LM:no obstruction  LAD:mid-vessel 50-60% narrowing  Lcx:mid-vessel 40-50% narrowing  RCA:dominant but small in caliber, ostial 70%, mid-vessel 80% narrowing     LVEDP:20     Access:  R Radial artery     Closure:   Vasc Band    Echocardiogram: 2022  Normal left ventricular size. Mild to moderate left ventricular hypertrophy.  Left ventricular function appears normal. Left ventricular ejection fraction  estimated 55 to 60%.Diastolic function appears abnormal.  Normal right ventricular size and systolic function.  Mild left atrial enlargement  No hemodynamically significant valve abnormality         Physical Examination  Review of Systems   Vitals: /80 (BP Location: Left arm, Patient Position: Sitting, Cuff Size: Adult Large)   Pulse 56   Resp 16   BMI= There is no height or weight on file to calculate BMI.  Wt Readings from Last 3 Encounters:   05/21/24 89.7 kg (197 lb 12.8 oz)   05/07/24 88.8 kg (195 lb 12.8 oz)   04/18/24 89.3 kg (196 lb 14.4 oz)        Obese, pleasant   ENT/Mouth: membranes moist, no oral lesions or bleeding gums.      EYES:  no scleral icterus, normal conjunctivae       Chest/Lungs:   lungs are clear to auscultation, no rales or wheezing, no sternal scar, equal chest wall expansion    Cardiovascular:   Regular. Normal first and second heart sounds with no murmurs, rubs, or gallops; the carotid, radial  pulses are intact, Jugular venous pressure normal, mild pitting edema bilaterally, no change       Extremities: no cyanosis or clubbing   Skin: no xanthelasma, warm.    Neurologic: normal  bilateral, no tremors     Psychiatric: alert and oriented x3, calm        Please refer above for cardiac ROS details.        Medical History  Surgical  History Family History Social History   Past Medical History:   Diagnosis Date    Asthma     Asthma     Asthma     Congestive heart failure (H)     Diabetes (H)     Diabetes (H)     Diabetes (H)     Hypertension     Hypertension     Hypertension     Panic attacks     Panic attacks     Panic attacks      Past Surgical History:   Procedure Laterality Date    ABSCESS DRAINAGE      On back    APPENDECTOMY      BIOPSY NODE SENTINEL Bilateral 2024    Procedure: BILATERAL SENTINEL LYMPH NODE INJECTION AND BIOPSY;  Surgeon: Lorelei Aburto MD;  Location: [facility] [place] OR    CHOLECYSTECTOMY      CV CORONARY ANGIOGRAM N/A 2024    Procedure: Coronary Angiogram;  Surgeon: Casa Vasquez MD;  Location: [facility] CATH LAB CV    CV LEFT HEART CATH N/A 2024    Procedure: Left Heart Catheterization;  Surgeon: Casa Vasquez MD;  Location: [facility] CATH LAB CV    HYSTERECTOMY      age 33 - partial    NEPHRECTOMY PARTIAL Right     OOPHORECTOMY      age 48    VULVECTOMY RADICAL (NO GROIN DISSECTION) N/A 2024    Procedure: MODIFIED RADICAL VULVECTOMY,;  Surgeon: Lorelei Aburto MD;  Location: [facility] [place] OR     Family History   Problem Relation Age of Onset    Breast Cancer Maternal Aunt 45.00    Alzheimer Disease Mother     Hypertension Mother     Chronic Obstructive Pulmonary Disease Father         Social History     Socioeconomic History    Marital status:      Spouse name: Not on file    Number of children: Not on file    Years of education: Not on file    Highest education level: Not on file   Occupational History    Not on file   Tobacco Use    Smoking status: Former     Current packs/day: 0.00     Types: Cigarettes     Quit date: 1992     Years since quittin.4    Smokeless tobacco: Never   Substance and Sexual Activity    Alcohol use: Yes    Drug use: Never    Sexual activity: Not Currently     Birth control/protection: Surgical   Other Topics Concern    Not on  file   Social History Narrative    Not on file     Social Drivers of Health     Financial Resource Strain: Not on file   Food Insecurity: Not on file   Transportation Needs: Not on file   Physical Activity: Not on file   Stress: Not on file   Social Connections: Not on file   Interpersonal Safety: Not on file   Housing Stability: Not on file           Medications  Allergies   Current Outpatient Medications   Medication Sig Dispense Refill    acetaminophen (TYLENOL) 325 MG tablet Take 3 tablets (975 mg) by mouth every 6 hours as needed for mild pain      albuterol (PROAIR HFA/PROVENTIL HFA/VENTOLIN HFA) 108 (90 Base) MCG/ACT inhaler Inhale 2 puffs into the lungs every 4 hours as needed for shortness of breath, wheezing or cough      amLODIPine (NORVASC) 5 MG tablet Take 5 mg by mouth daily      BD PEN NEEDLE ALEJANDRO 2ND GEN 32G X 4 MM miscellaneous USE THREE TIMES DAILY WITH NOVOLOG.      brimonidine (ALPHAGAN) 0.2 % ophthalmic solution Place 1 drop Into the left eye 2 times daily.      Continuous Blood Gluc Sensor (FREESTYLE TEMI 2 SENSOR) MISC Change every 14 days.      furosemide (LASIX) 40 MG tablet Take by mouth 2 times daily 80 mg in the morning and 40 mg in the evening      isosorbide mononitrate (IMDUR) 30 MG 24 hr tablet Take 1 tablet (30 mg) by mouth daily. ---Needs follow up with cardiology for further refills--- 30 tablet 0    Lancets (ONETOUCH DELICA PLUS [id number]) MISC TEST THREE TIMES DAILY      LANTUS SOLOSTAR 100 UNIT/ML soln Inject 43 Units subcutaneously at bedtime.      latanoprost (XALATAN) 0.005 % ophthalmic solution 1 drop.      levothyroxine (SYNTHROID/LEVOTHROID) 100 MCG tablet Take 1 tablet (100 mcg) by mouth daily 30 tablet 0    losartan (COZAAR) 50 MG tablet Take 50 mg by mouth daily      metoprolol tartrate (LOPRESSOR) 50 MG tablet [METOPROLOL TARTRATE (LOPRESSOR) 50 MG TABLET] Take 2 tablets (100 mg total) by mouth 2 (two) times a day. 60 tablet 0    NOVOLOG FLEXPEN 100 UNIT/ML soln  Inject 8-15 Units Subcutaneous 3 times daily (with meals) plus sliding scale  151-175- 1 unit  176-200- 2 units  201-225- 3 units  226-250- 4 units  251-275- 5 units   276-300- 6 units  301-325- 7 units  326-350- 8 units  351-375- 9 units      omeprazole (PRILOSEC) 20 MG capsule [OMEPRAZOLE (PRILOSEC) 20 MG CAPSULE] Take 1 capsule (20 mg total) by mouth daily before breakfast. 30 capsule 0    ONETOUCH VERIO IQ test strip test three times daily      rosuvastatin (CRESTOR) 40 MG tablet Take 1 tablet (40 mg) by mouth daily. ---Needs follow up with cardiology for further refills--- 30 tablet 0    timolol maleate (TIMOPTIC) 0.5 % ophthalmic solution 2 times daily.      WIXELA INHUB 250-50 MCG/ACT inhaler Inhale 1 puff into the lungs 2 times daily (Patient taking differently: Inhale 1 puff into the lungs 2 times daily as needed.)      lidocaine (XYLOCAINE) 5 % external ointment Apply topically 4 times daily as needed for moderate pain (Patient not taking: Reported on 5/22/2025)      LORazepam (ATIVAN) 0.5 MG tablet Take 0.5 mg by mouth daily as needed for anxiety (Patient not taking: Reported on 5/22/2025)      mometasone (NASONEX) 50 MCG/ACT nasal spray Spray 2 sprays into both nostrils daily as needed (rhinitis) (Patient not taking: Reported on 5/22/2025)      ondansetron (ZOFRAN) 4 MG tablet Take 4 mg by mouth 3 times daily as needed for nausea or vomiting (Patient not taking: Reported on 5/22/2025)      polyethylene glycol (MIRALAX) 17 GM/Dose powder Take 17 g by mouth daily (Patient not taking: Reported on 5/22/2025) 510 g 0    silver sulfADIAZINE (SILVADENE) 1 % external cream Apply topically 2 times daily (Patient not taking: Reported on 5/22/2025) 50 g 1       Allergies   Allergen Reactions    Alprazolam Other (See Comments)    Cephalexin Rash and Swelling    Exenatide Other (See Comments)    Hydralazine      Flush and Elevated BP     Iodinated Contrast Media [Iodinated Contrast Media] Unknown    Lisinopril Other  (See Comments)    Sulfa (Sulfonamide Antibiotics) [Sulfa Antibiotics] Other (See Comments)    Macrobid [Nitrofurantoin] Rash          Lab Results    Chemistry/lipid CBC Cardiac Enzymes/BNP/TSH/INR   Recent Labs   Lab Test 12/08/22  0937   CHOL 238*   HDL 63   *   TRIG 191*     Recent Labs   Lab Test 12/08/22  0937 03/16/22  1011 07/14/21  1152   * 58 74     Recent Labs   Lab Test 12/28/22  1207   *   POTASSIUM 4.3   CHLORIDE 97*   CO2 26   *   BUN 36.5*   CR 2.13*   GFRESTIMATED 24*   BILL 9.0     Creatinine   Date Value Ref Range Status   05/15/2025 3.19 (H) 0.51 - 0.95 mg/dL Final        Recent Labs   Lab Test 12/04/22  0624 12/01/22  0658 11/29/22  0640   WBC  --   --  5.6   HGB  --   --  14.2   HCT  --   --  41.7   MCV  --   --  91      < > 157    < > = values in this interval not displayed.     Recent Labs   Lab Test 11/29/22  0640 11/28/22  0835 06/17/22  0130   HGB 14.2 13.2 14.2    Recent Labs   Lab Test 06/17/22  0130 08/19/20  1407 08/17/20  1106   TROPONINI 0.02 0.03 0.03     Recent Labs   Lab Test 11/28/22  0713   NTBNPI 2,370*     Recent Labs   Lab Test 12/06/22  1135   TSH 3.09     Recent Labs   Lab Test 11/28/22  0920 08/20/20  1026 08/19/20  1407   INR 1.00 1.03 0.93        Lewis Trinidad, DO    The longitudinal plan of care for the diagnosis(es)/condition(s) as documented were addressed during this visit. Due to the added complexity in care, I will continue to support Bettye in the subsequent management and with ongoing continuity of care.  42 minutes spent by me on the date of the encounter doing chart review, history and exam, documentation and further activities per the note

## 2025-05-22 NOTE — LETTER
5/22/2025    ALEAH Justice  [address] [address] [facility] [address]    RE: Mariama Keene       Dear Colleague,     I had the pleasure of seeing Mariama Keene in the [facility].    HEART CARE ENCOUNTER CONSULTATON NOTE      [facility]  [phone]      Assessment/Recommendations   Assessment:   1.  Chronic congestive heart failure secondary to heart failure with preserved ejection fraction with left ventricular hypertrophy related to hypertension.  NYHA: II.    2.  Coronary artery disease, moderate to severe disease in right coronary artery medically managed.  No active anginal chest pain.    3. Hypertension,controled.   3. HLD.    4. DM type II  5. History of premature atrial contractions, regular today  6. CKD stage IV (GFR (15).     GFR Estimate   Date Value Ref Range Status   05/15/2025 15 (L) >60 mL/min/1.73m2 Final     Comment:     eGFR calculated using 2021 CKD-EPI equation.   04/07/2021 32 (L) >60 mL/min/1.73m2 Final     GFR Estimate If Black   Date Value Ref Range Status   04/07/2021 39 (L) >60 mL/min/1.73m2 Final   7. Asthma   8. Invasive squamous cell carcinoma of the vulva status post surgical resection    Plan:   1.  Lasix to 80 mg in AM and 40 mg in PM  2.  Continue Imdur for management of coronary artery disease.  No active chest pain.  Will hold on intervention at this time  3.  Amlodipine, losartan and metoprolol for HTN  4.  Worsening renal function with SGLT2i in past.    5.  Support evaluation for renal transplant.       History of Present Illness/Subjective    HPI: Mariama Keene is a 69 year old female with chronic kidney disease stage IV, diabetes mellitus which is uncontrolled, hypertension which has been uncontrolled for some time, asthma who presents to cardiology clinic in follow-up for heart failure with preserved ejection fraction and coronary disease.    Patient is stable course since last visit.  No active chest pain.  Denies any significant  lower extremity edema.  She does have mild dyspnea exertion which has been chronic and stable.  She is quite nervous about consideration for renal transplant.  Recommend that she is initially evaluated to see if this is a long-term option for her chronic kidney disease likely related to her diabetes.    If she does plan to proceed with renal transplant will support workup from a cardiovascular standpoint.      Coronary angiogram: 4/18/2024  Findings:  LM:no obstruction  LAD:mid-vessel 50-60% narrowing  Lcx:mid-vessel 40-50% narrowing  RCA:dominant but small in caliber, ostial 70%, mid-vessel 80% narrowing     LVEDP:20     Access:  R Radial artery     Closure:   Vasc Band    Echocardiogram: 2022  Normal left ventricular size. Mild to moderate left ventricular hypertrophy.  Left ventricular function appears normal. Left ventricular ejection fraction  estimated 55 to 60%.Diastolic function appears abnormal.  Normal right ventricular size and systolic function.  Mild left atrial enlargement  No hemodynamically significant valve abnormality         Physical Examination  Review of Systems   Vitals: /80 (BP Location: Left arm, Patient Position: Sitting, Cuff Size: Adult Large)   Pulse 56   Resp 16   BMI= There is no height or weight on file to calculate BMI.  Wt Readings from Last 3 Encounters:   05/21/24 89.7 kg (197 lb 12.8 oz)   05/07/24 88.8 kg (195 lb 12.8 oz)   04/18/24 89.3 kg (196 lb 14.4 oz)        Obese, pleasant   ENT/Mouth: membranes moist, no oral lesions or bleeding gums.      EYES:  no scleral icterus, normal conjunctivae       Chest/Lungs:   lungs are clear to auscultation, no rales or wheezing, no sternal scar, equal chest wall expansion    Cardiovascular:   Regular. Normal first and second heart sounds with no murmurs, rubs, or gallops; the carotid, radial  pulses are intact, Jugular venous pressure normal, mild pitting edema bilaterally, no change       Extremities: no cyanosis or clubbing    Skin: no xanthelasma, warm.    Neurologic: normal  bilateral, no tremors     Psychiatric: alert and oriented x3, calm        Please refer above for cardiac ROS details.        Medical History  Surgical History Family History Social History   Past Medical History:   Diagnosis Date     Asthma      Asthma      Asthma      Congestive heart failure (H)      Diabetes (H)      Diabetes (H)      Diabetes (H)      Hypertension      Hypertension      Hypertension      Panic attacks      Panic attacks      Panic attacks      Past Surgical History:   Procedure Laterality Date     ABSCESS DRAINAGE      On back     APPENDECTOMY       BIOPSY NODE SENTINEL Bilateral 5/7/2024    Procedure: BILATERAL SENTINEL LYMPH NODE INJECTION AND BIOPSY;  Surgeon: Lorelei Aburto MD;  Location: [facility] OR     CHOLECYSTECTOMY       CV CORONARY ANGIOGRAM N/A 04/18/2024    Procedure: Coronary Angiogram;  Surgeon: Casa Vasquez MD;  Location: [facility] CATH LAB CV     CV LEFT HEART CATH N/A 04/18/2024    Procedure: Left Heart Catheterization;  Surgeon: Casa Vasquez MD;  Location: [facility] CATH LAB CV     HYSTERECTOMY      age 33 - partial     NEPHRECTOMY PARTIAL Right      OOPHORECTOMY      age 48     VULVECTOMY RADICAL (NO GROIN DISSECTION) N/A 5/7/2024    Procedure: MODIFIED RADICAL VULVECTOMY,;  Surgeon: Lorelei Aburto MD;  Location: [facility] [place] OR     Family History   Problem Relation Age of Onset     Breast Cancer Maternal Aunt 45.00     Alzheimer Disease Mother      Hypertension Mother      Chronic Obstructive Pulmonary Disease Father         Social History     Socioeconomic History     Marital status:      Spouse name: Not on file     Number of children: Not on file     Years of education: Not on file     Highest education level: Not on file   Occupational History     Not on file   Tobacco Use     Smoking status: Former     Current packs/day: 0.00     Types: Cigarettes     Quit date:  1992     Years since quittin.4     Smokeless tobacco: Never   Substance and Sexual Activity     Alcohol use: Yes     Drug use: Never     Sexual activity: Not Currently     Birth control/protection: Surgical   Other Topics Concern     Not on file   Social History Narrative     Not on file     Social Drivers of Health     Financial Resource Strain: Not on file   Food Insecurity: Not on file   Transportation Needs: Not on file   Physical Activity: Not on file   Stress: Not on file   Social Connections: Not on file   Interpersonal Safety: Not on file   Housing Stability: Not on file           Medications  Allergies   Current Outpatient Medications   Medication Sig Dispense Refill     acetaminophen (TYLENOL) 325 MG tablet Take 3 tablets (975 mg) by mouth every 6 hours as needed for mild pain       albuterol (PROAIR HFA/PROVENTIL HFA/VENTOLIN HFA) 108 (90 Base) MCG/ACT inhaler Inhale 2 puffs into the lungs every 4 hours as needed for shortness of breath, wheezing or cough       amLODIPine (NORVASC) 5 MG tablet Take 5 mg by mouth daily       BD PEN NEEDLE ALEJANDRO 2ND GEN 32G X 4 MM miscellaneous USE THREE TIMES DAILY WITH NOVOLOG.       brimonidine (ALPHAGAN) 0.2 % ophthalmic solution Place 1 drop Into the left eye 2 times daily.       Continuous Blood Gluc Sensor (FREESTYLE TEMI 2 SENSOR) MISC Change every 14 days.       furosemide (LASIX) 40 MG tablet Take by mouth 2 times daily 80 mg in the morning and 40 mg in the evening       isosorbide mononitrate (IMDUR) 30 MG 24 hr tablet Take 1 tablet (30 mg) by mouth daily. ---Needs follow up with cardiology for further refills--- 30 tablet 0     Lancets (ONETOUCH DELICA PLUS CRLENS15F) MISC TEST THREE TIMES DAILY       LANTUS SOLOSTAR 100 UNIT/ML soln Inject 43 Units subcutaneously at bedtime.       latanoprost (XALATAN) 0.005 % ophthalmic solution 1 drop.       levothyroxine (SYNTHROID/LEVOTHROID) 100 MCG tablet Take 1 tablet (100 mcg) by mouth daily 30 tablet 0      losartan (COZAAR) 50 MG tablet Take 50 mg by mouth daily       metoprolol tartrate (LOPRESSOR) 50 MG tablet [METOPROLOL TARTRATE (LOPRESSOR) 50 MG TABLET] Take 2 tablets (100 mg total) by mouth 2 (two) times a day. 60 tablet 0     NOVOLOG FLEXPEN 100 UNIT/ML soln Inject 8-15 Units Subcutaneous 3 times daily (with meals) plus sliding scale  151-175- 1 unit  176-200- 2 units  201-225- 3 units  226-250- 4 units  251-275- 5 units   276-300- 6 units  301-325- 7 units  326-350- 8 units  351-375- 9 units       omeprazole (PRILOSEC) 20 MG capsule [OMEPRAZOLE (PRILOSEC) 20 MG CAPSULE] Take 1 capsule (20 mg total) by mouth daily before breakfast. 30 capsule 0     ONETOUCH VERIO IQ test strip test three times daily       rosuvastatin (CRESTOR) 40 MG tablet Take 1 tablet (40 mg) by mouth daily. ---Needs follow up with cardiology for further refills--- 30 tablet 0     timolol maleate (TIMOPTIC) 0.5 % ophthalmic solution 2 times daily.       WIXELA INHUB 250-50 MCG/ACT inhaler Inhale 1 puff into the lungs 2 times daily (Patient taking differently: Inhale 1 puff into the lungs 2 times daily as needed.)       lidocaine (XYLOCAINE) 5 % external ointment Apply topically 4 times daily as needed for moderate pain (Patient not taking: Reported on 5/22/2025)       LORazepam (ATIVAN) 0.5 MG tablet Take 0.5 mg by mouth daily as needed for anxiety (Patient not taking: Reported on 5/22/2025)       mometasone (NASONEX) 50 MCG/ACT nasal spray Spray 2 sprays into both nostrils daily as needed (rhinitis) (Patient not taking: Reported on 5/22/2025)       ondansetron (ZOFRAN) 4 MG tablet Take 4 mg by mouth 3 times daily as needed for nausea or vomiting (Patient not taking: Reported on 5/22/2025)       polyethylene glycol (MIRALAX) 17 GM/Dose powder Take 17 g by mouth daily (Patient not taking: Reported on 5/22/2025) 510 g 0     silver sulfADIAZINE (SILVADENE) 1 % external cream Apply topically 2 times daily (Patient not taking: Reported on  5/22/2025) 50 g 1       Allergies   Allergen Reactions     Alprazolam Other (See Comments)     Cephalexin Rash and Swelling     Exenatide Other (See Comments)     Hydralazine      Flush and Elevated BP      Iodinated Contrast Media [Iodinated Contrast Media] Unknown     Lisinopril Other (See Comments)     Sulfa (Sulfonamide Antibiotics) [Sulfa Antibiotics] Other (See Comments)     Macrobid [Nitrofurantoin] Rash          Lab Results    Chemistry/lipid CBC Cardiac Enzymes/BNP/TSH/INR   Recent Labs   Lab Test 12/08/22  0937   CHOL 238*   HDL 63   *   TRIG 191*     Recent Labs   Lab Test 12/08/22  0937 03/16/22  1011 07/14/21  1152   * 58 74     Recent Labs   Lab Test 12/28/22  1207   *   POTASSIUM 4.3   CHLORIDE 97*   CO2 26   *   BUN 36.5*   CR 2.13*   GFRESTIMATED 24*   BILL 9.0     Creatinine   Date Value Ref Range Status   05/15/2025 3.19 (H) 0.51 - 0.95 mg/dL Final        Recent Labs   Lab Test 12/04/22  0624 12/01/22  0658 11/29/22  0640   WBC  --   --  5.6   HGB  --   --  14.2   HCT  --   --  41.7   MCV  --   --  91      < > 157    < > = values in this interval not displayed.     Recent Labs   Lab Test 11/29/22  0640 11/28/22  0835 06/17/22  0130   HGB 14.2 13.2 14.2    Recent Labs   Lab Test 06/17/22  0130 08/19/20  1407 08/17/20  1106   TROPONINI 0.02 0.03 0.03     Recent Labs   Lab Test 11/28/22  0713   NTBNPI 2,370*     Recent Labs   Lab Test 12/06/22  1135   TSH 3.09     Recent Labs   Lab Test 11/28/22  0920 08/20/20  1026 08/19/20  1407   INR 1.00 1.03 0.93        Lewis Trinidad,     The longitudinal plan of care for the diagnosis(es)/condition(s) as documented were addressed during this visit. Due to the added complexity in care, I will continue to support Bettye in the subsequent management and with ongoing continuity of care.  42 minutes spent by me on the date of the encounter doing chart review, history and exam, documentation and further activities per the note          Thank you for allowing me to participate in the care of your patient.      Sincerely,     Lewis Trinidad DO     [facility]  cc:   Lewis Trinidad DO  [address] [place] [address]

## 2025-05-23 DIAGNOSIS — R94.39 ABNORMAL CARDIOVASCULAR STRESS TEST: ICD-10-CM

## 2025-05-23 DIAGNOSIS — I50.32 CHRONIC DIASTOLIC CONGESTIVE HEART FAILURE (H): ICD-10-CM

## 2025-05-23 DIAGNOSIS — N18.4 TYPE 2 DIABETES MELLITUS WITH STAGE 4 CHRONIC KIDNEY DISEASE, WITHOUT LONG-TERM CURRENT USE OF INSULIN (H): ICD-10-CM

## 2025-05-23 DIAGNOSIS — Z72.0 TOBACCO USER: ICD-10-CM

## 2025-05-23 DIAGNOSIS — I10 ESSENTIAL HYPERTENSION, BENIGN: ICD-10-CM

## 2025-05-23 DIAGNOSIS — E78.2 MIXED HYPERLIPIDEMIA: ICD-10-CM

## 2025-05-23 DIAGNOSIS — E11.22 TYPE 2 DIABETES MELLITUS WITH STAGE 4 CHRONIC KIDNEY DISEASE, WITHOUT LONG-TERM CURRENT USE OF INSULIN (H): ICD-10-CM

## 2025-05-27 RX ORDER — ISOSORBIDE MONONITRATE 30 MG/1
30 TABLET, EXTENDED RELEASE ORAL DAILY
Qty: 30 TABLET | Refills: 5 | Status: SHIPPED | OUTPATIENT
Start: 2025-05-27

## 2025-05-29 ENCOUNTER — OFFICE VISIT (OUTPATIENT)
Dept: PHARMACY | Facility: PHYSICIAN GROUP | Age: 70
End: 2025-05-29
Payer: COMMERCIAL

## 2025-05-29 VITALS — DIASTOLIC BLOOD PRESSURE: 78 MMHG | WEIGHT: 195 LBS | SYSTOLIC BLOOD PRESSURE: 126 MMHG | BODY MASS INDEX: 34.54 KG/M2

## 2025-05-29 DIAGNOSIS — E03.9 HYPOTHYROIDISM, UNSPECIFIED TYPE: ICD-10-CM

## 2025-05-29 DIAGNOSIS — Z79.4 INSULIN DEPENDENT TYPE 2 DIABETES MELLITUS (H): Primary | ICD-10-CM

## 2025-05-29 DIAGNOSIS — E11.9 INSULIN DEPENDENT TYPE 2 DIABETES MELLITUS (H): Primary | ICD-10-CM

## 2025-05-29 DIAGNOSIS — I25.118 CORONARY ARTERY DISEASE OF NATIVE HEART WITH STABLE ANGINA PECTORIS, UNSPECIFIED VESSEL OR LESION TYPE: ICD-10-CM

## 2025-05-29 DIAGNOSIS — K21.9 GASTROESOPHAGEAL REFLUX DISEASE WITHOUT ESOPHAGITIS: ICD-10-CM

## 2025-05-29 DIAGNOSIS — N18.4 CKD (CHRONIC KIDNEY DISEASE) STAGE 4, GFR 15-29 ML/MIN (H): ICD-10-CM

## 2025-05-29 DIAGNOSIS — E78.2 MIXED HYPERLIPIDEMIA: ICD-10-CM

## 2025-05-29 DIAGNOSIS — N25.81 SECONDARY RENAL HYPERPARATHYROIDISM: ICD-10-CM

## 2025-05-29 DIAGNOSIS — I10 ESSENTIAL HYPERTENSION: ICD-10-CM

## 2025-05-29 DIAGNOSIS — J45.30 MILD PERSISTENT ASTHMA WITHOUT COMPLICATION: ICD-10-CM

## 2025-05-29 RX ORDER — LOSARTAN POTASSIUM 100 MG/1
100 TABLET ORAL DAILY
COMMUNITY
Start: 2025-03-24

## 2025-05-29 RX ORDER — FUROSEMIDE 80 MG/1
80 TABLET ORAL
COMMUNITY
Start: 2025-05-23

## 2025-05-29 RX ORDER — ROSUVASTATIN CALCIUM 10 MG/1
10 TABLET, COATED ORAL AT BEDTIME
COMMUNITY
Start: 2025-05-20

## 2025-05-29 RX ORDER — CALCITRIOL 0.25 UG/1
0.25 CAPSULE, LIQUID FILLED ORAL EVERY OTHER DAY
COMMUNITY
Start: 2024-09-30 | End: 2025-09-30

## 2025-05-29 RX ORDER — METOPROLOL TARTRATE 100 MG/1
1 TABLET ORAL 2 TIMES DAILY
COMMUNITY
Start: 2025-05-14

## 2025-05-29 NOTE — LETTER
_  Medication List        Prepared on: May 29, 2025     Bring your Medication List when you go to the doctor, hospital, or   emergency room. And, share it with your family or caregivers.     Note any changes to how you take your medications.  Cross out medications when you no longer use them.    Medication How I take it Why I use it Prescriber   albuterol (PROAIR HFA/PROVENTIL HFA/VENTOLIN HFA) 108 (90 Base) MCG/ACT inhaler Inhale 2 puffs into the lungs every 4 hours as needed for shortness of breath, wheezing or cough Asthma ALEAH Connor   amLODIPine (NORVASC) 5 MG tablet Take 5 mg by mouth daily High Blood Pressure ALEAH Connor   brimonidine (ALPHAGAN) 0.2 % ophthalmic solution Place 1 drop Into the left eye 2 times daily.  Glaucoma Indiana Nixon   calcitRIOL (ROCALTROL) 0.25 MCG capsule Take 0.25 mcg by mouth every other day. Secondary Hyperparathyroidism Ainsley O'Angle   cholecalciferol 50 MCG (2000 UT) CAPS Take 1 capsule by mouth daily. General Health Patient Reported   furosemide (LASIX) 80 MG tablet Take 80 mg in the morning and 40 mg in the evening High Blood Pressure Ainsley Kimble   isosorbide mononitrate (IMDUR) 30 MG 24 hr tablet Take 1 tablet (30 mg) by mouth daily. Abnormal Cardiovascular Stress Test; Type 2 diabetes mellitus with stage 4 chronic kidney disease, without long-term current use of insulin (H); Chronic diastolic congestive heart failure (H); Mixed Hyperlipidemia; Tobacco user; Essential Hypertension, Benign Lewis Trinidad DO   LANTUS SOLOSTAR 100 UNIT/ML soln Inject 45 Units subcutaneously at bedtime. Type 2 Diabetes ALEAH Connor   latanoprost (XALATAN) 0.005 % ophthalmic solution 1 drop in eye as directed Glaucoma Indiana Nixon   levothyroxine (SYNTHROID/LEVOTHROID) 100 MCG tablet Take 1 tablet (100 mcg) by mouth daily Acquired Hypothyroidism Kadie Jones MD   losartan (COZAAR) 100 MG tablet Take 100 mg by mouth daily. High Blood Pressure ALEAH Connor    metoprolol tartrate (LOPRESSOR) 100 MG tablet Take 1 tablet by mouth 2 times daily. High Blood Pressure Lewis Trinidad DO   NOVOLOG FLEXPEN 100 UNIT/ML soln Inject 8-15 Units Subcutaneous 3 times daily (with meals) plus sliding scale 151-175- 1 [id number]- 2 [id number]- 3 [id number]- 4 [id number]- 5 units 276-300- 6 [id number]- 7 [id number]- 8 [id number]- 9 units Type 2 Diabetes ALEAH Connor   omeprazole (PRILOSEC) 20 MG capsule Take 1 capsule (20 mg total) by mouth daily before breakfast. Gastroesophageal Reflux Disease without Esophagitis Didier Urban MD   rosuvastatin (CRESTOR) 10 MG tablet Take 10 mg by mouth at bedtime.  High Cholesterol Ainsley Kimble   tirzepatide (MOUNJARO) 2.5 MG/0.5ML SOAJ auto-injector pen Inject 2.5 mg subcutaneously once a week. Type 2 Diabetes ALEAH Connor   WIXELA INHUB 250-50 MCG/ACT inhaler Inhale 1 puff into the lungs 2 times daily  Asthma Indiana Nixon         Add new medications, over-the-counter drugs, herbals, vitamins, or  minerals in the blank rows below.    Medication How I take it Why I use it Prescriber                                      Allergies:      - Alprazolam - Other (See Comments)  - Cephalexin - Rash, Swelling  - Exenatide - Other (See Comments)  - Hydralazine  - Iodinated Contrast Media [iodinated Contrast Media] - Unknown  - Lisinopril - Other (See Comments)  - Sulfa (sulfonamide Antibiotics) [sulfa Antibiotics] - Other (See Comments)  - Macrobid [nitrofurantoin] - Rash        Side effects I have had:      Not on File        Other Information:              My notes and questions:

## 2025-05-29 NOTE — LETTER
May 29, 2025  Mariama BELLO Erdisamar  [address]  [place] [place] [facility] [address]    Dear Ms. Keene, [facility]     Thank you for talking with me on May 29, 2025 about your health and medications. As a follow-up to our conversation, I have included two documents:      Your Recommended To-Do List has steps you should take to get the best results from your medications.  Your Medication List will help you keep track of your medications and how to take them.    If you want to talk about these documents, please call Evelyn Odell RPH at phone: [phone], Monday-Friday 8-4:30pm.    I look forward to working with you and your doctors to make sure your medications work well for you.    Sincerely,  Evelyn Odell RPH  [facility] Pharmacist, [facility]

## 2025-05-29 NOTE — LETTER
"Recommended To-Do List      Prepared on: May 29, 2025       You can get the best results from your medications by completing the items on this \"To-Do List.\"      Bring your To-Do List when you go to your doctor. And, share it with your family or caregivers.    My To-Do List:  What we talked about: What I should do:   A medication that is not working    Change the medication you are taking from FreeStyle Ursula 2 Sensor to FreeStyle Ursula 3 Plus Sensor          What we talked about: What I should do:                     "

## 2025-06-11 NOTE — PROGRESS NOTES
Medication Therapy Management (MTM) Encounter    ASSESSMENT:                            Medication Adherence/Access: No issues identified.      Type 2 Diabetes    Not meeting A1c goal <7%. Patient had moderate side effects with second dose of Mounjaro which resolved within three days, she is wanting to try the third dose. Reviewed with PCP who agrees with patient continuing medication at this time.     PLAN:                            Take your third dose of Mounjaro 2.5 mg as scheduled on Friday.     Follow-up: Return in about 4 weeks (around 7/10/2025) for Follow up, with PCP.    SUBJECTIVE/OBJECTIVE:                          Bettye Keene is a 69 year old female seen for a follow-up visit.       Reason for visit: Check in on Mounjaro.     Allergies/ADRs: Reviewed in chart  Past Medical History: Reviewed in chart  Tobacco: She reports that she quit smoking about 33 years ago. Her smoking use included cigarettes. She has never used smokeless tobacco.  Alcohol: none    Medication Adherence/Access: no issues reported.        Diabetes   Type 2 Diabetes    Mounjaro 2.5 mg once weekly   Novolog 100 UNIT/ML Solution, sliding scale (Has been giving between 4-8 units at meal times)   Lantus 45 units subcutaneous once daily   Freestyle Ursula 2 sensor and reader     Since our last visit patient has started Mounjaro. Notes the first week went well and she did not have any side effects. Reports bothersome GI upset with her second dose. States symptoms did resolve within three days. Denies nausea, but reports indigestion/discomfort a few hours after meals occasionally.     Current diabetes symptoms: none  Diet/Exercise: Reports exercise is challenging at this time due to ongoing chronic pain. Notes her portion sizes are not very large. Eating two meals per day.      Eye exam in the last 12 months? Yes- Date of last eye exam: 4/28/25,  Location: [facility] [place]  Foot exam: up to date      Today's Vitals: There were no  vitals taken for this visit.  ----------------      I spent 15 minutes with this patient today. All changes were made via collaborative practice agreement with ALEAH Justice.     A summary of these recommendations was sent via [employer].    Evelyn Odell PharmD  Medication Therapy Management Pharmacist       Telemedicine Visit Details  The patient's medications can be safely assessed via a telemedicine encounter.  Type of service:  Telephone visit  Originating Location (pt. Location): Home    Distant Location (provider location):  On-site  Start Time: 9:30 am  End Time: 9:45 AM     Medication Therapy Recommendations  No medication therapy recommendations to display

## 2025-06-12 ENCOUNTER — VIRTUAL VISIT (OUTPATIENT)
Dept: PHARMACY | Facility: PHYSICIAN GROUP | Age: 70
End: 2025-06-12
Payer: COMMERCIAL

## 2025-06-12 DIAGNOSIS — E11.9 INSULIN DEPENDENT TYPE 2 DIABETES MELLITUS (H): Primary | ICD-10-CM

## 2025-06-12 DIAGNOSIS — Z79.4 INSULIN DEPENDENT TYPE 2 DIABETES MELLITUS (H): Primary | ICD-10-CM

## 2025-06-12 NOTE — PATIENT INSTRUCTIONS
"Recommendations from today's MTM visit:                                                       Take your third dose of Mounjaro as scheduled on Friday.     Follow-up: Return in about 4 weeks (around 7/10/2025) for Follow up, with PCP.    It was great speaking with you today.  I value your experience and would be very thankful for your time in providing feedback in our clinic survey. In the next few days, you may receive an email or text message from [employer] with a link to a survey related to your  clinical pharmacist.\"     To schedule another MTM appointment, please call the clinic directly or you may call the MTM scheduling line at [phone].    My Clinical Pharmacist's contact information:                                                      Please feel free to contact me with any questions or concerns you have.      Evelyn Odell, PharmD  Medication Therapy Management Pharmacist    "

## 2025-06-25 ENCOUNTER — VIRTUAL VISIT (OUTPATIENT)
Dept: PHARMACY | Facility: PHYSICIAN GROUP | Age: 70
End: 2025-06-25
Payer: COMMERCIAL

## 2025-06-25 DIAGNOSIS — Z79.4 INSULIN DEPENDENT TYPE 2 DIABETES MELLITUS (H): Primary | ICD-10-CM

## 2025-06-25 DIAGNOSIS — E11.9 INSULIN DEPENDENT TYPE 2 DIABETES MELLITUS (H): Primary | ICD-10-CM

## 2025-06-25 PROCEDURE — 99606 MTMS BY PHARM EST 15 MIN: CPT | Mod: 93 | Performed by: PHARMACIST

## 2025-06-25 NOTE — PROGRESS NOTES
Medication Therapy Management (MTM) Encounter    ASSESSMENT:                            Medication Adherence/Access: No issues identified.      Type 2 Diabetes    Not meeting A1c goal <7%. Patient has been tolerating last two doses of Mounjaro 2.5 mg, but would like one more month at lower dose. Reasonable to have one more month at lower dose. Will plan to reassess in four weeks.     PLAN:                            Complete one more month at 2.5 mg dose of Mounjaro and we will reassess before next refill. If tolerating will increase to 5 mg dose.     Follow-up: Return in about 4 weeks (around 7/23/2025) for Follow up, with me or Indiana Nixon.    SUBJECTIVE/OBJECTIVE:                          Bettye Keene is a 69 year old female seen for a follow-up visit.       Reason for visit: Check in on Mounjaro.     Allergies/ADRs: Reviewed in chart  Past Medical History: Reviewed in chart  Tobacco: She reports that she quit smoking about 33 years ago. Her smoking use included cigarettes. She has never used smokeless tobacco.  Alcohol: none    Medication Adherence/Access: no issues reported.        Diabetes   Type 2 Diabetes    Mounjaro 2.5 mg once weekly   Novolog 100 UNIT/ML Solution, sliding scale (Has been giving between 4-8 units at meal times)   Lantus 45 units subcutaneous once daily   Freestyle Ursula 2 sensor and reader     Since our last visit patient has been taking Mounjaro. Notes the first week went well and she did not have any side effects. Reports bothersome GI upset with her second dose. States symptoms did resolve within three days. Her most recent two doses were better tolerated with more mild side effects. She is wanting to continue medication but would like to stick with the 2.5 mg dose for another month.     Current diabetes symptoms: none  Diet/Exercise: Reports exercise is challenging at this time due to ongoing chronic pain. Notes her portion sizes are not very large. Eating two meals per day.      Eye exam  in the last 12 months? Yes- Date of last eye exam: 4/28/25,  Location: [facility]  Foot exam: up to date      Today's Vitals: There were no vitals taken for this visit.  ----------------      I spent 6 minutes with this patient today. All changes were made via collaborative practice agreement with ALEAH Justice.     A summary of these recommendations was sent via [employer].    Ji SinhaD  Medication Therapy Management Pharmacist       Telemedicine Visit Details  The patient's medications can be safely assessed via a telemedicine encounter.  Type of service:  Telephone visit  Originating Location (pt. Location): Home    Distant Location (provider location):  On-site  Start Time: 12:27 pm  End Time: 12:23 PM     Medication Therapy Recommendations  No medication therapy recommendations to display

## 2025-06-26 NOTE — PATIENT INSTRUCTIONS
"Recommendations from today's MTM visit:                                                       Complete one more month at 2.5 mg dose of Mounjaro and we will reassess before next refill. If tolerating will increase to 5 mg dose.     Follow-up: Return in about 4 weeks (around 7/23/2025) for Follow up, with me or Indiana Nixon.    It was great speaking with you today.  I value your experience and would be very thankful for your time in providing feedback in our clinic survey. In the next few days, you may receive an email or text message from [employer] with a link to a survey related to your  clinical pharmacist.\"     To schedule another MTM appointment, please call the clinic directly or you may call the MTM scheduling line at [phone].    My Clinical Pharmacist's contact information:                                                      Please feel free to contact me with any questions or concerns you have.      Evelyn Odell, PharmD  Medication Therapy Management Pharmacist    "

## 2025-06-28 ENCOUNTER — HEALTH MAINTENANCE LETTER (OUTPATIENT)
Age: 70
End: 2025-06-28

## 2025-07-23 ENCOUNTER — VIRTUAL VISIT (OUTPATIENT)
Dept: PHARMACY | Facility: PHYSICIAN GROUP | Age: 70
End: 2025-07-23
Payer: COMMERCIAL

## 2025-07-23 DIAGNOSIS — E11.9 INSULIN DEPENDENT TYPE 2 DIABETES MELLITUS (H): Primary | ICD-10-CM

## 2025-07-23 DIAGNOSIS — Z79.4 INSULIN DEPENDENT TYPE 2 DIABETES MELLITUS (H): Primary | ICD-10-CM

## 2025-07-23 PROCEDURE — 99606 MTMS BY PHARM EST 15 MIN: CPT | Mod: 93 | Performed by: PHARMACIST

## 2025-07-23 NOTE — PROGRESS NOTES
Medication Therapy Management (MTM) Encounter    ASSESSMENT:                            Medication Adherence/Access: No issues identified.      Type 2 Diabetes    Not meeting A1c goal <7%. Patient has had bothersome constipation with Mounjaro 2.5 mg dose. She is seeing PCP tomorrow and wants to discuss therapy further at that time. If continuing weekly injectable would advise starting daily bowel regimen with Miralax. Alternatively, could adjust insulin doses for continued blood sugar management.      PLAN:                            At your visit with [place] tomorrow, discuss continuing or stopping Mounjaro. If we stop we can adjust your insulin dose to continue managing blood sugars.     Follow-up: Return in 1 day (on 7/24/2025) for Follow up, with PCP.    SUBJECTIVE/OBJECTIVE:                          Bettye Keene is a 69 year old female seen for a follow-up visit.       Reason for visit: Check in on Mounjaro.     Allergies/ADRs: Reviewed in chart  Past Medical History: Reviewed in chart  Tobacco: She reports that she quit smoking about 33 years ago. Her smoking use included cigarettes. She has never used smokeless tobacco.  Alcohol: none    Medication Adherence/Access: no issues reported.        Diabetes   Type 2 Diabetes    Mounjaro 2.5 mg once weekly   Novolog 100 UNIT/ML Solution, sliding scale (Has been giving between 4-8 units at meal times)   Lantus 45 units subcutaneous once daily   Freestyle Ursula 2 sensor and reader     Since our last visit patient has been taking Mounjaro. Notes she has not had anymore extreme GI side upset, but continues to experience constipation. Reports small hard stool about once daily, but feels she never completely voids. Occasional abdominal tenderness. She has tried senna without success. She does state her blood sugars have improved with the Mounjaro, but is not losing any weight. Patient would like to discuss further with provider at visit tomorrow.     Current diabetes  symptoms: fatigue  Diet/Exercise: Reports exercise is challenging at this time due to ongoing chronic pain. Notes her portion sizes are not very large. Eating two meals per day. Eating lots of veggies and drinking several bottles of water daily.   Blood sugar range per patient:  mg/dl      Eye exam in the last 12 months? Yes- Date of last eye exam: 4/28/25,  Location: [facility]  Foot exam: up to date      Today's Vitals: There were no vitals taken for this visit.  ----------------      I spent 11 minutes with this patient today. All changes were made via collaborative practice agreement with ALEAH Justice.     A summary of these recommendations was sent via [employer].    Evelyn Odell, PharmD  Medication Therapy Management Pharmacist       Telemedicine Visit Details  The patient's medications can be safely assessed via a telemedicine encounter.  Type of service:  Telephone visit  Originating Location (pt. Location): Home    Distant Location (provider location):  On-site  Start Time: 2:17 pm  End Time: 2:28 PM     Medication Therapy Recommendations  No medication therapy recommendations to display

## 2025-07-23 NOTE — PATIENT INSTRUCTIONS
"Recommendations from today's MTM visit:                                                       At your visit with [place] tomorrow, discuss continuing or stopping Mounjaro. If we stop we can adjust your insulin dose to continue managing blood sugars.     Follow-up: Return in 1 day (on 7/24/2025) for Follow up, with PCP.    It was great speaking with you today.  I value your experience and would be very thankful for your time in providing feedback in our clinic survey. In the next few days, you may receive an email or text message from [employer] with a link to a survey related to your  clinical pharmacist.\"     To schedule another MTM appointment, please call the clinic directly or you may call the MTM scheduling line at [phone].    My Clinical Pharmacist's contact information:                                                      Please feel free to contact me with any questions or concerns you have.      Evelyn Odell, PharmD  Medication Therapy Management Pharmacist    "

## 2025-08-27 ENCOUNTER — LAB REQUISITION (OUTPATIENT)
Dept: LAB | Facility: CLINIC | Age: 70
End: 2025-08-27

## 2025-08-27 DIAGNOSIS — E11.22 TYPE 2 DIABETES MELLITUS WITH DIABETIC CHRONIC KIDNEY DISEASE (H): ICD-10-CM

## 2025-08-27 DIAGNOSIS — I13.0 HYPERTENSIVE HEART AND CHRONIC KIDNEY DISEASE WITH HEART FAILURE AND STAGE 1 THROUGH STAGE 4 CHRONIC KIDNEY DISEASE, OR UNSPECIFIED CHRONIC KIDNEY DISEASE (H): ICD-10-CM

## 2025-08-27 LAB
ALBUMIN SERPL BCG-MCNC: 4.3 G/DL (ref 3.5–5.2)
ALP SERPL-CCNC: 62 U/L (ref 40–150)
ALT SERPL W P-5'-P-CCNC: 24 U/L (ref 0–50)
ANION GAP SERPL CALCULATED.3IONS-SCNC: 15 MMOL/L (ref 7–15)
AST SERPL W P-5'-P-CCNC: 28 U/L (ref 0–45)
BILIRUB SERPL-MCNC: 0.7 MG/DL
BUN SERPL-MCNC: 77.2 MG/DL (ref 8–23)
CALCIUM SERPL-MCNC: 9 MG/DL (ref 8.8–10.4)
CHLORIDE SERPL-SCNC: 97 MMOL/L (ref 98–107)
CHOLEST SERPL-MCNC: 118 MG/DL
CREAT SERPL-MCNC: 3.83 MG/DL (ref 0.51–0.95)
CREAT UR-MCNC: 68.3 MG/DL
EGFRCR SERPLBLD CKD-EPI 2021: 12 ML/MIN/1.73M2
FASTING STATUS PATIENT QL REPORTED: NO
FASTING STATUS PATIENT QL REPORTED: NO
GLUCOSE SERPL-MCNC: 95 MG/DL (ref 70–99)
HCO3 SERPL-SCNC: 25 MMOL/L (ref 22–29)
HDLC SERPL-MCNC: 45 MG/DL
LDLC SERPL CALC-MCNC: 52 MG/DL
MICROALBUMIN UR-MCNC: 984 MG/L
MICROALBUMIN/CREAT UR: 1440.7 MG/G CR (ref 0–25)
NONHDLC SERPL-MCNC: 73 MG/DL
POTASSIUM SERPL-SCNC: 4.7 MMOL/L (ref 3.4–5.3)
PROT SERPL-MCNC: 6.8 G/DL (ref 6.4–8.3)
SODIUM SERPL-SCNC: 137 MMOL/L (ref 135–145)
TRIGL SERPL-MCNC: 107 MG/DL

## 2025-08-27 PROCEDURE — 82570 ASSAY OF URINE CREATININE: CPT | Performed by: PHYSICIAN ASSISTANT

## 2025-08-27 PROCEDURE — 80053 COMPREHEN METABOLIC PANEL: CPT | Performed by: PHYSICIAN ASSISTANT

## 2025-08-27 PROCEDURE — 80061 LIPID PANEL: CPT | Performed by: PHYSICIAN ASSISTANT

## (undated) DEVICE — SPONGE RAY-TEC 4X8" 7318

## (undated) DEVICE — SU SILK 2-0 SH 30" K833H

## (undated) DEVICE — CATH DIAGNOSTIC RADIAL 5FR TIG 4.0

## (undated) DEVICE — SUTURE MONOCRYL 3-0 18 PS2 UND MCP497G

## (undated) DEVICE — SYR 10ML FINGER CONTROL W/O NDL 309695

## (undated) DEVICE — PROBE ELECTROSURGICAL TRUNODE GAMMA 120-807605

## (undated) DEVICE — SYR ANGIOGRAPHY MULTIUSE KIT ACIST 014612

## (undated) DEVICE — PREP DYNA-HEX 4% CHG SCRUB 4OZ BOTTLE MDS098710

## (undated) DEVICE — PEN MARKING SKIN W/LABELS 31145918

## (undated) DEVICE — DRAPE LEGGINGS 8421

## (undated) DEVICE — NEEDLE HYPO MAGELLAN SAFETY 22GA 1 1/2IN 8881850215

## (undated) DEVICE — SUTURE VICRYL+ 2-0 27IN CT-1 UND VCP259H

## (undated) DEVICE — ESU LIGASURE OPEN SEALER/DIVIDER SM JAW 16.5MM LF1212A

## (undated) DEVICE — GLOVE BIOGEL PI ULTRATOUCH G SZ 6.5 42165

## (undated) DEVICE — ELECTRODE DEFIB CADENCE 22550R

## (undated) DEVICE — GOWN IMPERVIOUS BREATHABLE SMART LG 89015

## (undated) DEVICE — JELLY LUBRICATING SURGILUBE 2OZ TUBE

## (undated) DEVICE — MAT FLOOR SURGICAL 40X38 0702140238

## (undated) DEVICE — MANIFOLD KIT ANGIO AUTOMATED 014613

## (undated) DEVICE — ESU PENCIL SMOKE EVAC W/ROCKER SWITCH 0703-047-000

## (undated) DEVICE — SUCTION TIP YANKAUER W/O VENT K86

## (undated) DEVICE — GUIDEWIRE FORTE FLOPPY J TOP 34949-05J

## (undated) DEVICE — NDL 25GA 2"  8881200441

## (undated) DEVICE — TUBING SUCTION MEDI-VAC 1/4"X20' N620A

## (undated) DEVICE — GLOVE BIOGEL PI ULTRATOUCH G SZ 7.0 42170

## (undated) DEVICE — SU VICRYL+ 3-0 27IN SH UND VCP416H

## (undated) DEVICE — CUSTOM PACK CORONARY SAN5BCRHEA

## (undated) DEVICE — GLOVE UNDER INDICATOR PI SZ 7.0 LF 41670

## (undated) DEVICE — PREP CHLORAPREP 26ML TINTED HI-LITE ORANGE 930815

## (undated) DEVICE — KIT HAND CONTROL ACIST 014644 AR-P54

## (undated) DEVICE — SU STRATAFIX MONOCRYL 3-0 SPIRAL PS-2 30CM SXMP1B106

## (undated) DEVICE — SUTURE VICRYL+ 2-0 27IN SH UND VCP417H

## (undated) DEVICE — SHTH INTRO 0.021IN ID 6FR DIA

## (undated) DEVICE — SLEEVE TR BAND RADIAL COMPRESSION DEVICE 24CM TRB24-REG

## (undated) DEVICE — SYR 10ML LL W/O NDL 302995

## (undated) DEVICE — BLADE KNIFE SURG 10 371110

## (undated) DEVICE — SYR BULB IRRIG DOVER 60 ML LATEX FREE 67000

## (undated) DEVICE — DRAPE LAP/PELVIC 36X123IN ABDL FEN LF STRL 29407

## (undated) DEVICE — SU DERMABOND ADVANCED .7ML DNX12

## (undated) DEVICE — CUSTOM PACK PERI GYN SMA5BPGHEA

## (undated) DEVICE — SOL WATER IRRIG 1000ML BOTTLE 2F7114

## (undated) DEVICE — BRIEF STRETCH XL MPS40

## (undated) DEVICE — ESU GROUND PAD ADULT REM W/15' CORD E7507DB

## (undated) DEVICE — SUCTION MANIFOLD NEPTUNE 2 SYS 1 PORT 702-025-000

## (undated) DEVICE — CLIP HORIZON MULTI MED BLUE 002204

## (undated) RX ORDER — FENTANYL CITRATE 50 UG/ML
INJECTION, SOLUTION INTRAMUSCULAR; INTRAVENOUS
Status: DISPENSED
Start: 2024-05-07

## (undated) RX ORDER — PROPOFOL 10 MG/ML
INJECTION, EMULSION INTRAVENOUS
Status: DISPENSED
Start: 2024-05-07

## (undated) RX ORDER — DIAZEPAM 5 MG
TABLET ORAL
Status: DISPENSED
Start: 2024-04-18

## (undated) RX ORDER — FENTANYL CITRATE 50 UG/ML
INJECTION, SOLUTION INTRAMUSCULAR; INTRAVENOUS
Status: DISPENSED
Start: 2024-04-18

## (undated) RX ORDER — BUPIVACAINE HYDROCHLORIDE 2.5 MG/ML
INJECTION, SOLUTION INFILTRATION; PERINEURAL
Status: DISPENSED
Start: 2024-05-07

## (undated) RX ORDER — PHENYLEPHRINE HYDROCHLORIDE 10 MG/ML
INJECTION INTRAVENOUS
Status: DISPENSED
Start: 2024-05-07

## (undated) RX ORDER — LIDOCAINE HYDROCHLORIDE 10 MG/ML
INJECTION, SOLUTION EPIDURAL; INFILTRATION; INTRACAUDAL; PERINEURAL
Status: DISPENSED
Start: 2024-05-07

## (undated) RX ORDER — SILVER SULFADIAZINE 10 MG/G
CREAM TOPICAL
Status: DISPENSED
Start: 2024-05-07